# Patient Record
Sex: FEMALE | Race: WHITE | Employment: UNEMPLOYED | URBAN - NONMETROPOLITAN AREA
[De-identification: names, ages, dates, MRNs, and addresses within clinical notes are randomized per-mention and may not be internally consistent; named-entity substitution may affect disease eponyms.]

---

## 2017-04-24 ENCOUNTER — TELEPHONE (OUTPATIENT)
Dept: ONCOLOGY | Age: 47
End: 2017-04-24

## 2017-04-24 DIAGNOSIS — D50.9 IRON DEFICIENCY ANEMIA, UNSPECIFIED IRON DEFICIENCY ANEMIA TYPE: Primary | ICD-10-CM

## 2017-08-21 DIAGNOSIS — G35 MS (MULTIPLE SCLEROSIS) (HCC): Primary | ICD-10-CM

## 2017-08-22 DIAGNOSIS — G35 MS (MULTIPLE SCLEROSIS) (HCC): ICD-10-CM

## 2018-06-07 ENCOUNTER — TELEPHONE (OUTPATIENT)
Dept: ONCOLOGY | Age: 48
End: 2018-06-07

## 2018-06-13 ENCOUNTER — HOSPITAL ENCOUNTER (OUTPATIENT)
Dept: INFUSION THERAPY | Age: 48
Discharge: HOME | End: 2018-06-13
Payer: COMMERCIAL

## 2018-06-13 VITALS
WEIGHT: 249.6 LBS | OXYGEN SATURATION: 100 % | TEMPERATURE: 98.5 F | RESPIRATION RATE: 18 BRPM | HEART RATE: 86 BPM | BODY MASS INDEX: 34.94 KG/M2 | SYSTOLIC BLOOD PRESSURE: 131 MMHG | DIASTOLIC BLOOD PRESSURE: 74 MMHG | HEIGHT: 71 IN

## 2018-06-13 DIAGNOSIS — K90.9 MALABSORPTION OF IRON: ICD-10-CM

## 2018-06-13 DIAGNOSIS — D50.9 IRON DEFICIENCY ANEMIA, UNSPECIFIED IRON DEFICIENCY ANEMIA TYPE: ICD-10-CM

## 2018-06-13 PROCEDURE — 6360000002 HC RX W HCPCS: Performed by: INTERNAL MEDICINE

## 2018-06-13 PROCEDURE — 96365 THER/PROPH/DIAG IV INF INIT: CPT

## 2018-06-13 PROCEDURE — 2580000003 HC RX 258: Performed by: INTERNAL MEDICINE

## 2018-06-13 PROCEDURE — 96366 THER/PROPH/DIAG IV INF ADDON: CPT

## 2018-06-13 RX ORDER — DIPHENHYDRAMINE HYDROCHLORIDE 50 MG/ML
50 INJECTION INTRAMUSCULAR; INTRAVENOUS ONCE
Status: CANCELLED | OUTPATIENT
Start: 2018-06-13 | End: 2018-06-13

## 2018-06-13 RX ORDER — SODIUM CHLORIDE 0.9 % (FLUSH) 0.9 %
5 SYRINGE (ML) INJECTION PRN
Status: CANCELLED | OUTPATIENT
Start: 2018-06-13

## 2018-06-13 RX ORDER — MONTELUKAST SODIUM 4 MG/500MG
4 GRANULE ORAL NIGHTLY
COMMUNITY

## 2018-06-13 RX ORDER — METHYLPREDNISOLONE SODIUM SUCCINATE 125 MG/2ML
125 INJECTION, POWDER, LYOPHILIZED, FOR SOLUTION INTRAMUSCULAR; INTRAVENOUS ONCE
Status: CANCELLED | OUTPATIENT
Start: 2018-06-13 | End: 2018-06-13

## 2018-06-13 RX ORDER — SODIUM CHLORIDE 0.9 % (FLUSH) 0.9 %
10 SYRINGE (ML) INJECTION PRN
Status: CANCELLED | OUTPATIENT
Start: 2018-06-13

## 2018-06-13 RX ORDER — HEPARIN SODIUM (PORCINE) LOCK FLUSH IV SOLN 100 UNIT/ML 100 UNIT/ML
500 SOLUTION INTRAVENOUS PRN
Status: CANCELLED | OUTPATIENT
Start: 2018-06-13

## 2018-06-13 RX ORDER — EPINEPHRINE 1 MG/ML
0.3 INJECTION, SOLUTION, CONCENTRATE INTRAVENOUS PRN
Status: CANCELLED | OUTPATIENT
Start: 2018-06-13

## 2018-06-13 RX ORDER — SODIUM CHLORIDE 9 MG/ML
INJECTION, SOLUTION INTRAVENOUS ONCE
Status: COMPLETED | OUTPATIENT
Start: 2018-06-13 | End: 2018-06-13

## 2018-06-13 RX ORDER — 0.9 % SODIUM CHLORIDE 0.9 %
10 VIAL (ML) INJECTION ONCE
Status: CANCELLED | OUTPATIENT
Start: 2018-06-13 | End: 2018-06-13

## 2018-06-13 RX ORDER — SODIUM CHLORIDE 9 MG/ML
INJECTION, SOLUTION INTRAVENOUS CONTINUOUS
Status: CANCELLED | OUTPATIENT
Start: 2018-06-13

## 2018-06-13 RX ADMIN — SODIUM CHLORIDE: 9 INJECTION, SOLUTION INTRAVENOUS at 12:20

## 2018-06-13 RX ADMIN — IRON SUCROSE 300 MG: 20 INJECTION, SOLUTION INTRAVENOUS at 12:24

## 2018-06-13 NOTE — PROGRESS NOTES
Patient assessed for the following post venofer:    Dizziness   No  Lightheadedness  No      Acute nausea/vomiting No  Headache   No  Chest pain/pressure  No  Rash/itching   No  Shortness of breath  No    Patient kept for 20 minutes observation post infusion chemotherapy. Patient tolerated chemotherapy treatment venofer without any complications. Last vital signs:   /74   Pulse 86   Temp 98.5 °F (36.9 °C) (Oral)   Resp 18   Ht 5' 10.5\" (1.791 m)   Wt 249 lb 9.6 oz (113.2 kg)   SpO2 100%   BMI 35.31 kg/m²         Patient instructed if experience any of the above symptoms following today's infusion,he/she is to notify MD immediately or go to the emergency department. Discharge instructions given to patient. Verbalizes understanding. Ambulated off unit per self with belongings.

## 2018-06-13 NOTE — PLAN OF CARE
Problem: Intellectual/Education/Knowledge Deficit  Intervention: Verbal/written education provided  Discuss venofer,action and possible side effects. Aware to call MD if develop complications. Goal: Teaching initiated upon admission  Outcome: Met This Shift  Patient verbalizes understanding to verbal information given on venofer,action and possible side effects. Aware to call MD if develop complications. Problem: Discharge Planning  Intervention: Discharge to appropriate level of care  Discuss discharge instructions, follow ups and when to call doctor. Goal: Knowledge of discharge instructions  Knowledge of discharge instructions    Outcome: Met This Shift  Verbalized understanding of discharge instructions, follow ups and when to call doctor    Comments: Care plan reviewed with patient. Patient verbalized understanding of the plan of care and contribute to goal setting.

## 2018-07-17 ENCOUNTER — TELEPHONE (OUTPATIENT)
Dept: ONCOLOGY | Age: 48
End: 2018-07-17

## 2018-07-17 RX ORDER — DIPHENHYDRAMINE HYDROCHLORIDE 50 MG/ML
50 INJECTION INTRAMUSCULAR; INTRAVENOUS ONCE
Status: CANCELLED | OUTPATIENT
Start: 2018-07-25 | End: 2018-07-25

## 2018-07-17 RX ORDER — SODIUM CHLORIDE 0.9 % (FLUSH) 0.9 %
10 SYRINGE (ML) INJECTION PRN
Status: CANCELLED | OUTPATIENT
Start: 2018-07-25

## 2018-07-17 RX ORDER — METHYLPREDNISOLONE SODIUM SUCCINATE 125 MG/2ML
125 INJECTION, POWDER, LYOPHILIZED, FOR SOLUTION INTRAMUSCULAR; INTRAVENOUS ONCE
Status: CANCELLED | OUTPATIENT
Start: 2018-07-25 | End: 2018-07-25

## 2018-07-17 RX ORDER — SODIUM CHLORIDE 9 MG/ML
INJECTION, SOLUTION INTRAVENOUS ONCE
Status: CANCELLED | OUTPATIENT
Start: 2018-07-25 | End: 2018-07-25

## 2018-07-17 RX ORDER — HEPARIN SODIUM (PORCINE) LOCK FLUSH IV SOLN 100 UNIT/ML 100 UNIT/ML
500 SOLUTION INTRAVENOUS PRN
Status: CANCELLED | OUTPATIENT
Start: 2018-07-25

## 2018-07-17 RX ORDER — 0.9 % SODIUM CHLORIDE 0.9 %
10 VIAL (ML) INJECTION ONCE
Status: CANCELLED | OUTPATIENT
Start: 2018-07-25 | End: 2018-07-25

## 2018-07-17 RX ORDER — SODIUM CHLORIDE 9 MG/ML
INJECTION, SOLUTION INTRAVENOUS CONTINUOUS
Status: CANCELLED | OUTPATIENT
Start: 2018-07-25

## 2018-07-17 RX ORDER — SODIUM CHLORIDE 0.9 % (FLUSH) 0.9 %
5 SYRINGE (ML) INJECTION PRN
Status: CANCELLED | OUTPATIENT
Start: 2018-07-25

## 2018-07-17 NOTE — TELEPHONE ENCOUNTER
Patient is calling and said the she has no energy. She had an IV iron during the 1st week of June. Her ferritin is only at 30.  Please advise

## 2018-07-25 ENCOUNTER — HOSPITAL ENCOUNTER (OUTPATIENT)
Dept: INFUSION THERAPY | Age: 48
Discharge: HOME | End: 2018-07-25
Payer: COMMERCIAL

## 2018-07-25 VITALS
OXYGEN SATURATION: 100 % | HEART RATE: 72 BPM | SYSTOLIC BLOOD PRESSURE: 131 MMHG | HEIGHT: 71 IN | TEMPERATURE: 98 F | BODY MASS INDEX: 35.34 KG/M2 | RESPIRATION RATE: 18 BRPM | WEIGHT: 252.4 LBS | DIASTOLIC BLOOD PRESSURE: 83 MMHG

## 2018-07-25 DIAGNOSIS — D50.9 IRON DEFICIENCY ANEMIA, UNSPECIFIED IRON DEFICIENCY ANEMIA TYPE: ICD-10-CM

## 2018-07-25 DIAGNOSIS — E61.1 IRON DEFICIENCY: Primary | ICD-10-CM

## 2018-07-25 DIAGNOSIS — K90.9 MALABSORPTION OF IRON: ICD-10-CM

## 2018-07-25 PROCEDURE — 96365 THER/PROPH/DIAG IV INF INIT: CPT

## 2018-07-25 PROCEDURE — 2709999900 HC NON-CHARGEABLE SUPPLY

## 2018-07-25 PROCEDURE — 6360000002 HC RX W HCPCS: Performed by: INTERNAL MEDICINE

## 2018-07-25 PROCEDURE — 2580000003 HC RX 258: Performed by: INTERNAL MEDICINE

## 2018-07-25 PROCEDURE — 96366 THER/PROPH/DIAG IV INF ADDON: CPT

## 2018-07-25 RX ORDER — SODIUM CHLORIDE 9 MG/ML
INJECTION, SOLUTION INTRAVENOUS ONCE
Status: COMPLETED | OUTPATIENT
Start: 2018-07-25 | End: 2018-07-25

## 2018-07-25 RX ORDER — SCOLOPAMINE TRANSDERMAL SYSTEM 1 MG/1
1 PATCH, EXTENDED RELEASE TRANSDERMAL
Qty: 4 PATCH | Refills: 3 | Status: SHIPPED | OUTPATIENT
Start: 2018-07-25 | End: 2019-02-04 | Stop reason: SDUPTHER

## 2018-07-25 RX ADMIN — IRON SUCROSE 300 MG: 20 INJECTION, SOLUTION INTRAVENOUS at 16:02

## 2018-07-25 RX ADMIN — SODIUM CHLORIDE: 9 INJECTION, SOLUTION INTRAVENOUS at 16:01

## 2018-07-26 NOTE — PROGRESS NOTES
Patient assessed for the following post venofer:    Dizziness   No  Lightheadedness  No      Acute nausea/vomiting No  Headache   No  Chest pain/pressure  No  Rash/itching   No  Shortness of breath  No    Patient kept for 20 minutes observation post infusion venofer. Patient tolerated venofer without any complications. Last vital signs:   /83   Pulse 72   Temp 98 °F (36.7 °C) (Oral)   Resp 18   Ht 5' 10.5\" (1.791 m)   Wt 252 lb 6.4 oz (114.5 kg)   SpO2 100%   BMI 35.70 kg/m²         Patient instructed if experience any of the above symptoms following today's infusion,he/she is to notify MD immediately or go to the emergency department. Discharge instructions given to patient. Verbalizes understanding. Ambulated off unit per self with belongings.

## 2018-07-31 RX ORDER — EPINEPHRINE 1 MG/ML
0.3 INJECTION, SOLUTION, CONCENTRATE INTRAVENOUS PRN
Status: CANCELLED | OUTPATIENT
Start: 2018-08-01

## 2018-07-31 RX ORDER — DIPHENHYDRAMINE HYDROCHLORIDE 50 MG/ML
50 INJECTION INTRAMUSCULAR; INTRAVENOUS ONCE
Status: CANCELLED | OUTPATIENT
Start: 2018-08-01 | End: 2018-07-31

## 2018-07-31 RX ORDER — SODIUM CHLORIDE 0.9 % (FLUSH) 0.9 %
10 SYRINGE (ML) INJECTION PRN
Status: CANCELLED | OUTPATIENT
Start: 2018-08-01

## 2018-07-31 RX ORDER — SODIUM CHLORIDE 0.9 % (FLUSH) 0.9 %
5 SYRINGE (ML) INJECTION PRN
Status: CANCELLED | OUTPATIENT
Start: 2018-08-01

## 2018-07-31 RX ORDER — SODIUM CHLORIDE 9 MG/ML
INJECTION, SOLUTION INTRAVENOUS CONTINUOUS
Status: CANCELLED | OUTPATIENT
Start: 2018-08-01

## 2018-07-31 RX ORDER — HEPARIN SODIUM (PORCINE) LOCK FLUSH IV SOLN 100 UNIT/ML 100 UNIT/ML
500 SOLUTION INTRAVENOUS PRN
Status: CANCELLED | OUTPATIENT
Start: 2018-08-01

## 2018-07-31 RX ORDER — 0.9 % SODIUM CHLORIDE 0.9 %
10 VIAL (ML) INJECTION ONCE
Status: CANCELLED | OUTPATIENT
Start: 2018-08-01 | End: 2018-07-31

## 2018-07-31 RX ORDER — METHYLPREDNISOLONE SODIUM SUCCINATE 125 MG/2ML
125 INJECTION, POWDER, LYOPHILIZED, FOR SOLUTION INTRAMUSCULAR; INTRAVENOUS ONCE
Status: CANCELLED | OUTPATIENT
Start: 2018-08-01 | End: 2018-07-31

## 2018-08-01 ENCOUNTER — HOSPITAL ENCOUNTER (OUTPATIENT)
Dept: INFUSION THERAPY | Age: 48
Discharge: HOME | End: 2018-08-01
Payer: COMMERCIAL

## 2018-08-01 VITALS
OXYGEN SATURATION: 100 % | SYSTOLIC BLOOD PRESSURE: 138 MMHG | RESPIRATION RATE: 16 BRPM | TEMPERATURE: 98.7 F | DIASTOLIC BLOOD PRESSURE: 82 MMHG | HEART RATE: 70 BPM

## 2018-08-01 DIAGNOSIS — K90.9 MALABSORPTION OF IRON: ICD-10-CM

## 2018-08-01 DIAGNOSIS — D50.9 IRON DEFICIENCY ANEMIA, UNSPECIFIED IRON DEFICIENCY ANEMIA TYPE: ICD-10-CM

## 2018-08-01 LAB
BASOPHILS # BLD: 0.5 %
BASOPHILS ABSOLUTE: 0 THOU/MM3 (ref 0–0.1)
EOSINOPHIL # BLD: 1 %
EOSINOPHILS ABSOLUTE: 0.1 THOU/MM3 (ref 0–0.4)
ERYTHROCYTE [DISTWIDTH] IN BLOOD BY AUTOMATED COUNT: 17.3 % (ref 11.5–14.5)
ERYTHROCYTE [DISTWIDTH] IN BLOOD BY AUTOMATED COUNT: 49.3 FL (ref 35–45)
FERRITIN: 227 NG/ML (ref 10–291)
HCT VFR BLD CALC: 38.7 % (ref 37–47)
HEMOGLOBIN: 13.2 GM/DL (ref 12–16)
IMMATURE GRANS (ABS): 0.01 THOU/MM3 (ref 0–0.07)
IMMATURE GRANULOCYTES: 0.2 %
LYMPHOCYTES # BLD: 19.5 %
LYMPHOCYTES ABSOLUTE: 1.1 THOU/MM3 (ref 1–4.8)
MCH RBC QN AUTO: 27.3 PG (ref 26–33)
MCHC RBC AUTO-ENTMCNC: 34.1 GM/DL (ref 32.2–35.5)
MCV RBC AUTO: 80.1 FL (ref 81–99)
MONOCYTES # BLD: 10.8 %
MONOCYTES ABSOLUTE: 0.6 THOU/MM3 (ref 0.4–1.3)
NUCLEATED RED BLOOD CELLS: 0 /100 WBC
PLATELET # BLD: 346 THOU/MM3 (ref 130–400)
PMV BLD AUTO: 10.8 FL (ref 9.4–12.4)
RBC # BLD: 4.83 MILL/MM3 (ref 4.2–5.4)
SEG NEUTROPHILS: 68 %
SEGMENTED NEUTROPHILS ABSOLUTE COUNT: 3.9 THOU/MM3 (ref 1.8–7.7)
WBC # BLD: 5.7 THOU/MM3 (ref 4.8–10.8)

## 2018-08-01 PROCEDURE — 36415 COLL VENOUS BLD VENIPUNCTURE: CPT

## 2018-08-01 PROCEDURE — 96365 THER/PROPH/DIAG IV INF INIT: CPT

## 2018-08-01 PROCEDURE — 82728 ASSAY OF FERRITIN: CPT

## 2018-08-01 PROCEDURE — 96366 THER/PROPH/DIAG IV INF ADDON: CPT

## 2018-08-01 PROCEDURE — 2709999900 HC NON-CHARGEABLE SUPPLY

## 2018-08-01 PROCEDURE — G0463 HOSPITAL OUTPT CLINIC VISIT: HCPCS

## 2018-08-01 PROCEDURE — 85025 COMPLETE CBC W/AUTO DIFF WBC: CPT

## 2018-08-01 PROCEDURE — 6360000002 HC RX W HCPCS: Performed by: INTERNAL MEDICINE

## 2018-08-01 PROCEDURE — 2580000003 HC RX 258: Performed by: INTERNAL MEDICINE

## 2018-08-01 RX ORDER — SODIUM CHLORIDE 9 MG/ML
INJECTION, SOLUTION INTRAVENOUS ONCE
Status: COMPLETED | OUTPATIENT
Start: 2018-08-01 | End: 2018-08-01

## 2018-08-01 RX ORDER — ASCORBIC ACID 500 MG
1000 TABLET ORAL DAILY
COMMUNITY
End: 2022-02-09 | Stop reason: ALTCHOICE

## 2018-08-01 RX ORDER — PREDNISONE 10 MG/1
10 TABLET ORAL DAILY
COMMUNITY

## 2018-08-01 RX ADMIN — IRON SUCROSE 300 MG: 20 INJECTION, SOLUTION INTRAVENOUS at 15:35

## 2018-08-01 RX ADMIN — SODIUM CHLORIDE: 9 INJECTION, SOLUTION INTRAVENOUS at 15:35

## 2018-08-01 ASSESSMENT — PAIN DESCRIPTION - ORIENTATION
ORIENTATION: RIGHT
ORIENTATION: RIGHT

## 2018-08-01 ASSESSMENT — PAIN DESCRIPTION - FREQUENCY: FREQUENCY: INTERMITTENT

## 2018-08-01 ASSESSMENT — PAIN SCALES - GENERAL
PAINLEVEL_OUTOF10: 3
PAINLEVEL_OUTOF10: 3

## 2018-08-01 ASSESSMENT — PAIN DESCRIPTION - LOCATION
LOCATION: ABDOMEN
LOCATION: ABDOMEN

## 2018-08-01 ASSESSMENT — PAIN DESCRIPTION - PAIN TYPE
TYPE: CHRONIC PAIN
TYPE: CHRONIC PAIN

## 2018-08-01 ASSESSMENT — PAIN DESCRIPTION - ONSET: ONSET: ON-GOING

## 2018-08-01 NOTE — PROGRESS NOTES
Patient assessed for the following post VENOFER:    Dizziness   No  Lightheadedness  No      Acute nausea/vomiting No  Headache   No  Chest pain/pressure  No  Rash/itching   No  Shortness of breath  No    Patient kept for 20 minutes observation post infusion chemotherapy. Patient tolerated VENOFER INFUSION without any complications. Last vital signs:   /82   Pulse 70   Temp 98.7 °F (37.1 °C) (Oral)   Resp 16   SpO2 100%     Patient instructed if experience any of the above symptoms following today's infusion,she is to notify MD immediately or go to the emergency department. Discharge instructions given to patient. Verbalizes understanding. Ambulated off unit per self with belongings.

## 2018-08-02 ENCOUNTER — TELEPHONE (OUTPATIENT)
Dept: ONCOLOGY | Age: 48
End: 2018-08-02

## 2018-08-02 NOTE — TELEPHONE ENCOUNTER
Patient calling for update on blood work and update for treatment in relation to labs. States feeling severely fatigued. Also wanted to make note that WBC for patient needs to be below 4 and is currently 5.7. Please advise.

## 2018-08-02 NOTE — TELEPHONE ENCOUNTER
Hemoglobin, hematocrit, and ferritin are normal.  Blood results are good. She should probably see Dr. Aldo Gray - her PCP - to see if there is something else making her feel bad.

## 2019-01-23 ENCOUNTER — TELEPHONE (OUTPATIENT)
Dept: ONCOLOGY | Age: 49
End: 2019-01-23

## 2019-01-23 DIAGNOSIS — D50.9 IRON DEFICIENCY ANEMIA, UNSPECIFIED IRON DEFICIENCY ANEMIA TYPE: Primary | ICD-10-CM

## 2019-01-23 LAB
BASOPHILS ABSOLUTE: 0 /ΜL
BASOPHILS RELATIVE PERCENT: 0.4 %
EOSINOPHILS ABSOLUTE: 0.1 /ΜL
EOSINOPHILS RELATIVE PERCENT: 1.1 %
FERRITIN: 26.3 NG/ML (ref 9–150)
HCT VFR BLD CALC: 40.3 % (ref 36–46)
HEMOGLOBIN: 13.7 G/DL (ref 12–16)
LYMPHOCYTES ABSOLUTE: 0.9 /ΜL
LYMPHOCYTES RELATIVE PERCENT: 20.6 %
MCH RBC QN AUTO: 28.9 PG
MCHC RBC AUTO-ENTMCNC: 34 G/DL
MCV RBC AUTO: 85 FL
MONOCYTES ABSOLUTE: 0.5 /ΜL
MONOCYTES RELATIVE PERCENT: 11.1 %
NEUTROPHILS ABSOLUTE: 3.1 /ΜL
NEUTROPHILS RELATIVE PERCENT: 66.6 %
PLATELET # BLD: 338 K/ΜL
PMV BLD AUTO: 10.9 FL
RBC # BLD: 4.74 10^6/ΜL
WBC # BLD: 4.6 10^3/ML

## 2019-01-29 ENCOUNTER — TELEPHONE (OUTPATIENT)
Dept: ONCOLOGY | Age: 49
End: 2019-01-29

## 2019-02-02 RX ORDER — SODIUM CHLORIDE 0.9 % (FLUSH) 0.9 %
5 SYRINGE (ML) INJECTION PRN
Status: CANCELLED | OUTPATIENT
Start: 2019-02-04

## 2019-02-02 RX ORDER — DIPHENHYDRAMINE HYDROCHLORIDE 50 MG/ML
50 INJECTION INTRAMUSCULAR; INTRAVENOUS ONCE
Status: CANCELLED | OUTPATIENT
Start: 2019-02-04 | End: 2019-02-18

## 2019-02-02 RX ORDER — HEPARIN SODIUM (PORCINE) LOCK FLUSH IV SOLN 100 UNIT/ML 100 UNIT/ML
500 SOLUTION INTRAVENOUS PRN
Status: CANCELLED | OUTPATIENT
Start: 2019-02-04

## 2019-02-02 RX ORDER — SODIUM CHLORIDE 0.9 % (FLUSH) 0.9 %
10 SYRINGE (ML) INJECTION PRN
Status: CANCELLED | OUTPATIENT
Start: 2019-02-04

## 2019-02-02 RX ORDER — SODIUM CHLORIDE 9 MG/ML
INJECTION, SOLUTION INTRAVENOUS CONTINUOUS
Status: CANCELLED | OUTPATIENT
Start: 2019-02-04

## 2019-02-02 RX ORDER — 0.9 % SODIUM CHLORIDE 0.9 %
10 VIAL (ML) INJECTION ONCE
Status: CANCELLED | OUTPATIENT
Start: 2019-02-04 | End: 2019-02-18

## 2019-02-02 RX ORDER — METHYLPREDNISOLONE SODIUM SUCCINATE 125 MG/2ML
125 INJECTION, POWDER, LYOPHILIZED, FOR SOLUTION INTRAMUSCULAR; INTRAVENOUS ONCE
Status: CANCELLED | OUTPATIENT
Start: 2019-02-04 | End: 2019-02-18

## 2019-02-04 ENCOUNTER — HOSPITAL ENCOUNTER (OUTPATIENT)
Dept: INFUSION THERAPY | Age: 49
Discharge: HOME | End: 2019-02-04
Payer: COMMERCIAL

## 2019-02-04 VITALS
TEMPERATURE: 98.4 F | RESPIRATION RATE: 18 BRPM | HEART RATE: 95 BPM | DIASTOLIC BLOOD PRESSURE: 81 MMHG | SYSTOLIC BLOOD PRESSURE: 142 MMHG | OXYGEN SATURATION: 99 %

## 2019-02-04 DIAGNOSIS — D50.9 IRON DEFICIENCY ANEMIA, UNSPECIFIED IRON DEFICIENCY ANEMIA TYPE: ICD-10-CM

## 2019-02-04 DIAGNOSIS — E61.1 IRON DEFICIENCY: ICD-10-CM

## 2019-02-04 DIAGNOSIS — K90.9 MALABSORPTION OF IRON: ICD-10-CM

## 2019-02-04 PROCEDURE — 2580000003 HC RX 258: Performed by: INTERNAL MEDICINE

## 2019-02-04 PROCEDURE — 96366 THER/PROPH/DIAG IV INF ADDON: CPT

## 2019-02-04 PROCEDURE — 2709999900 HC NON-CHARGEABLE SUPPLY

## 2019-02-04 PROCEDURE — 96365 THER/PROPH/DIAG IV INF INIT: CPT

## 2019-02-04 PROCEDURE — 6360000002 HC RX W HCPCS: Performed by: INTERNAL MEDICINE

## 2019-02-04 RX ORDER — SODIUM CHLORIDE 9 MG/ML
INJECTION, SOLUTION INTRAVENOUS ONCE
Status: COMPLETED | OUTPATIENT
Start: 2019-02-04 | End: 2019-02-04

## 2019-02-04 RX ORDER — SCOLOPAMINE TRANSDERMAL SYSTEM 1 MG/1
1 PATCH, EXTENDED RELEASE TRANSDERMAL
Qty: 4 PATCH | Refills: 3 | Status: SHIPPED | OUTPATIENT
Start: 2019-02-04 | End: 2020-02-20

## 2019-02-04 RX ADMIN — SODIUM CHLORIDE: 9 INJECTION, SOLUTION INTRAVENOUS at 15:40

## 2019-02-04 RX ADMIN — IRON SUCROSE 300 MG: 20 INJECTION, SOLUTION INTRAVENOUS at 15:41

## 2019-02-04 ASSESSMENT — PAIN SCALES - GENERAL: PAINLEVEL_OUTOF10: 0

## 2019-02-04 NOTE — PROGRESS NOTES
Patient assessed for the following post venofer:    Dizziness   No  Lightheadedness  No      Acute nausea/vomiting No  Headache   No  Chest pain/pressure  No  Rash/itching   No  Shortness of breath  No    Patient kept for 20 minutes observation post infusion . Patient tolerated venofer without any complications. Last vital signs:   BP (!) 142/81   Pulse 95   Temp 98.4 °F (36.9 °C) (Oral)   Resp 18   SpO2 99%     Patient instructed if experience any of the above symptoms following today's infusion,she is to notify MD immediately or go to the emergency department. Discharge instructions given to patient. Verbalizes understanding. Ambulated off unit per self with belongings.

## 2019-02-05 RX ORDER — HEPARIN SODIUM (PORCINE) LOCK FLUSH IV SOLN 100 UNIT/ML 100 UNIT/ML
500 SOLUTION INTRAVENOUS PRN
Status: CANCELLED | OUTPATIENT
Start: 2019-02-05

## 2019-02-05 RX ORDER — SODIUM CHLORIDE 0.9 % (FLUSH) 0.9 %
10 SYRINGE (ML) INJECTION PRN
Status: CANCELLED | OUTPATIENT
Start: 2019-02-05

## 2019-02-05 RX ORDER — SODIUM CHLORIDE 0.9 % (FLUSH) 0.9 %
20 SYRINGE (ML) INJECTION PRN
Status: CANCELLED | OUTPATIENT
Start: 2019-02-05

## 2019-02-06 ENCOUNTER — TELEPHONE (OUTPATIENT)
Dept: ONCOLOGY | Age: 49
End: 2019-02-06

## 2019-02-06 RX ORDER — ONDANSETRON 4 MG/1
4 TABLET, FILM COATED ORAL EVERY 8 HOURS PRN
Qty: 30 TABLET | Refills: 3 | Status: SHIPPED | OUTPATIENT
Start: 2019-02-06

## 2019-02-11 ENCOUNTER — HOSPITAL ENCOUNTER (OUTPATIENT)
Dept: INFUSION THERAPY | Age: 49
Discharge: HOME | End: 2019-02-11
Payer: COMMERCIAL

## 2019-02-11 VITALS
RESPIRATION RATE: 18 BRPM | OXYGEN SATURATION: 97 % | BODY MASS INDEX: 34.23 KG/M2 | TEMPERATURE: 98.3 F | SYSTOLIC BLOOD PRESSURE: 126 MMHG | DIASTOLIC BLOOD PRESSURE: 80 MMHG | HEART RATE: 60 BPM | WEIGHT: 242 LBS

## 2019-02-11 DIAGNOSIS — D50.9 IRON DEFICIENCY ANEMIA, UNSPECIFIED IRON DEFICIENCY ANEMIA TYPE: ICD-10-CM

## 2019-02-11 DIAGNOSIS — K90.9 MALABSORPTION OF IRON: ICD-10-CM

## 2019-02-11 PROCEDURE — 2709999900 HC NON-CHARGEABLE SUPPLY

## 2019-02-11 PROCEDURE — G0463 HOSPITAL OUTPT CLINIC VISIT: HCPCS

## 2019-02-11 PROCEDURE — 96365 THER/PROPH/DIAG IV INF INIT: CPT

## 2019-02-11 PROCEDURE — 96375 TX/PRO/DX INJ NEW DRUG ADDON: CPT

## 2019-02-11 PROCEDURE — 96366 THER/PROPH/DIAG IV INF ADDON: CPT

## 2019-02-11 PROCEDURE — 2580000003 HC RX 258: Performed by: INTERNAL MEDICINE

## 2019-02-11 PROCEDURE — 6360000002 HC RX W HCPCS: Performed by: INTERNAL MEDICINE

## 2019-02-11 RX ORDER — SODIUM CHLORIDE 0.9 % (FLUSH) 0.9 %
10 SYRINGE (ML) INJECTION PRN
Status: CANCELLED | OUTPATIENT
Start: 2019-02-11

## 2019-02-11 RX ORDER — DIPHENHYDRAMINE HYDROCHLORIDE 50 MG/ML
50 INJECTION INTRAMUSCULAR; INTRAVENOUS ONCE
Status: CANCELLED | OUTPATIENT
Start: 2019-02-11 | End: 2019-02-11

## 2019-02-11 RX ORDER — PALONOSETRON 0.05 MG/ML
0.25 INJECTION, SOLUTION INTRAVENOUS ONCE
Status: COMPLETED | OUTPATIENT
Start: 2019-02-11 | End: 2019-02-11

## 2019-02-11 RX ORDER — 0.9 % SODIUM CHLORIDE 0.9 %
10 VIAL (ML) INJECTION ONCE
Status: CANCELLED | OUTPATIENT
Start: 2019-02-11 | End: 2019-02-11

## 2019-02-11 RX ORDER — 0.9 % SODIUM CHLORIDE 0.9 %
10 VIAL (ML) INJECTION ONCE
Status: CANCELLED | OUTPATIENT
Start: 2019-02-18 | End: 2019-02-18

## 2019-02-11 RX ORDER — SODIUM CHLORIDE 9 MG/ML
INJECTION, SOLUTION INTRAVENOUS ONCE
Status: CANCELLED | OUTPATIENT
Start: 2019-02-18

## 2019-02-11 RX ORDER — SODIUM CHLORIDE 0.9 % (FLUSH) 0.9 %
5 SYRINGE (ML) INJECTION PRN
Status: CANCELLED | OUTPATIENT
Start: 2019-02-18

## 2019-02-11 RX ORDER — METHYLPREDNISOLONE SODIUM SUCCINATE 125 MG/2ML
125 INJECTION, POWDER, LYOPHILIZED, FOR SOLUTION INTRAMUSCULAR; INTRAVENOUS ONCE
Status: CANCELLED | OUTPATIENT
Start: 2019-02-11 | End: 2019-02-11

## 2019-02-11 RX ORDER — DIPHENHYDRAMINE HYDROCHLORIDE 50 MG/ML
50 INJECTION INTRAMUSCULAR; INTRAVENOUS ONCE
Status: CANCELLED | OUTPATIENT
Start: 2019-02-18 | End: 2019-02-18

## 2019-02-11 RX ORDER — SODIUM CHLORIDE 0.9 % (FLUSH) 0.9 %
5 SYRINGE (ML) INJECTION PRN
Status: CANCELLED | OUTPATIENT
Start: 2019-02-11

## 2019-02-11 RX ORDER — HEPARIN SODIUM (PORCINE) LOCK FLUSH IV SOLN 100 UNIT/ML 100 UNIT/ML
500 SOLUTION INTRAVENOUS PRN
Status: CANCELLED | OUTPATIENT
Start: 2019-02-18

## 2019-02-11 RX ORDER — SODIUM CHLORIDE 0.9 % (FLUSH) 0.9 %
10 SYRINGE (ML) INJECTION PRN
Status: CANCELLED | OUTPATIENT
Start: 2019-02-18

## 2019-02-11 RX ORDER — METHYLPREDNISOLONE SODIUM SUCCINATE 125 MG/2ML
125 INJECTION, POWDER, LYOPHILIZED, FOR SOLUTION INTRAMUSCULAR; INTRAVENOUS ONCE
Status: CANCELLED | OUTPATIENT
Start: 2019-02-18 | End: 2019-02-18

## 2019-02-11 RX ORDER — SODIUM CHLORIDE 9 MG/ML
INJECTION, SOLUTION INTRAVENOUS CONTINUOUS
Status: CANCELLED | OUTPATIENT
Start: 2019-02-11

## 2019-02-11 RX ORDER — SODIUM CHLORIDE 9 MG/ML
INJECTION, SOLUTION INTRAVENOUS CONTINUOUS
Status: CANCELLED | OUTPATIENT
Start: 2019-02-18

## 2019-02-11 RX ORDER — SODIUM CHLORIDE 9 MG/ML
INJECTION, SOLUTION INTRAVENOUS ONCE
Status: COMPLETED | OUTPATIENT
Start: 2019-02-11 | End: 2019-02-11

## 2019-02-11 RX ORDER — HEPARIN SODIUM (PORCINE) LOCK FLUSH IV SOLN 100 UNIT/ML 100 UNIT/ML
500 SOLUTION INTRAVENOUS PRN
Status: CANCELLED | OUTPATIENT
Start: 2019-02-11

## 2019-02-11 RX ADMIN — PALONOSETRON 0.25 MG: 0.05 INJECTION, SOLUTION INTRAVENOUS at 15:47

## 2019-02-11 RX ADMIN — IRON SUCROSE 300 MG: 20 INJECTION, SOLUTION INTRAVENOUS at 15:51

## 2019-02-11 RX ADMIN — SODIUM CHLORIDE: 9 INJECTION, SOLUTION INTRAVENOUS at 15:43

## 2019-02-11 ASSESSMENT — PAIN SCALES - GENERAL
PAINLEVEL_OUTOF10: 0
PAINLEVEL_OUTOF10: 0

## 2019-02-11 NOTE — PLAN OF CARE
Problem: Musculor/Skeletal Functional Status  Intervention: Fall precautions  Patient aware of fall precautions for here and at home -call light in reach     Goal: Absence of falls  Outcome: Met This Shift  No falls this admission     Problem: Intellectual/Education/Knowledge Deficit  Intervention: Verbal/written education provided  Discharge instruction sheets     Goal: Teaching initiated upon admission  Outcome: Met This Shift  Reviewed venofer and aloxi with patient, patient offered no questions or concerns. Patient verbalized understanding of drug being administered. Goal: Written Disposition Instruction form completed  Outcome: Met This Shift  Discharge instructions given and reviewed with patient. All questions answered. Patient verbalized understanding    Problem: Discharge Planning  Intervention: Interaction with patient/family and care team  Patient currently denies any needs or concerns  Intervention: Discharge to appropriate level of care  Discharge instruction sheets    Goal: Knowledge of discharge instructions  Knowledge of discharge instructions    Outcome: Met This Shift  Patient and family member able to teach back follow up appointments and when to call the doctor. Patient offers no questions at this time    Problem: Infection - Central Venous Catheter-Associated Bloodstream Infection:  Intervention: Central line needs assessment  Patient aware that is of increased risk for infection due to receiving chemotherapy and having a central venous catheter. Patient aware of signs and symptoms of infection and when to call the doctor    Goal: Will show no infection signs and symptoms  Will show no infection signs and symptoms  Outcome: Met This Shift  No signs of infection noted at mediport site    Comments: Care plan reviewed with patient and she verbalized understanding of the plan of care and contribute to goal setting.

## 2019-02-11 NOTE — PROGRESS NOTES
Patient assessed for the following post venofer:    Dizziness   No  Lightheadedness  No      Acute nausea/vomiting No  Headache   No  Chest pain/pressure  No  Rash/itching   No  Shortness of breath  No    Patient kept for 20 minutes observation post infusion . Patient tolerated venofer without any complications. Last vital signs:   /80   Pulse 60   Temp 98.3 °F (36.8 °C) (Oral)   Resp 18   Wt 242 lb (109.8 kg)   SpO2 97%   BMI 34.23 kg/m²     Patient instructed if experience any of the above symptoms following today's infusion,she is to notify MD immediately or go to the emergency department. Discharge instructions given to patient. Verbalizes understanding. Ambulated off unit per self with belongings.

## 2019-02-18 ENCOUNTER — HOSPITAL ENCOUNTER (OUTPATIENT)
Dept: INFUSION THERAPY | Age: 49
Discharge: HOME | End: 2019-02-18
Payer: COMMERCIAL

## 2019-02-18 VITALS
SYSTOLIC BLOOD PRESSURE: 132 MMHG | WEIGHT: 246 LBS | TEMPERATURE: 98.8 F | OXYGEN SATURATION: 93 % | HEIGHT: 69 IN | DIASTOLIC BLOOD PRESSURE: 87 MMHG | BODY MASS INDEX: 36.43 KG/M2 | RESPIRATION RATE: 18 BRPM | HEART RATE: 72 BPM

## 2019-02-18 DIAGNOSIS — K90.9 MALABSORPTION OF IRON: ICD-10-CM

## 2019-02-18 DIAGNOSIS — D50.9 IRON DEFICIENCY ANEMIA, UNSPECIFIED IRON DEFICIENCY ANEMIA TYPE: ICD-10-CM

## 2019-02-18 PROCEDURE — 2580000003 HC RX 258: Performed by: INTERNAL MEDICINE

## 2019-02-18 PROCEDURE — 96375 TX/PRO/DX INJ NEW DRUG ADDON: CPT

## 2019-02-18 PROCEDURE — 2709999900 HC NON-CHARGEABLE SUPPLY

## 2019-02-18 PROCEDURE — 96366 THER/PROPH/DIAG IV INF ADDON: CPT

## 2019-02-18 PROCEDURE — 96365 THER/PROPH/DIAG IV INF INIT: CPT

## 2019-02-18 PROCEDURE — 6360000002 HC RX W HCPCS: Performed by: INTERNAL MEDICINE

## 2019-02-18 RX ORDER — SODIUM CHLORIDE 9 MG/ML
INJECTION, SOLUTION INTRAVENOUS ONCE
Status: COMPLETED | OUTPATIENT
Start: 2019-02-18 | End: 2019-02-18

## 2019-02-18 RX ORDER — PALONOSETRON 0.05 MG/ML
0.25 INJECTION, SOLUTION INTRAVENOUS ONCE
Status: COMPLETED | OUTPATIENT
Start: 2019-02-18 | End: 2019-02-18

## 2019-02-18 RX ADMIN — PALONOSETRON 0.25 MG: 0.05 INJECTION, SOLUTION INTRAVENOUS at 15:53

## 2019-02-18 RX ADMIN — IRON SUCROSE 300 MG: 20 INJECTION, SOLUTION INTRAVENOUS at 15:58

## 2019-02-18 RX ADMIN — SODIUM CHLORIDE: 9 INJECTION, SOLUTION INTRAVENOUS at 15:45

## 2019-02-18 NOTE — PLAN OF CARE
Problem: Intellectual/Education/Knowledge Deficit  Intervention: Verbal/written education provided  Discussed information given on iron infusion ,action and possible side effects. Aware to call MD if develop complications. Goal: Teaching initiated upon admission  Patient verbalizes understanding to verbal information given on iron infusion ,action and possible side effects. Aware to call MD if develop complications. Problem: Discharge Planning  Intervention: Interaction with patient/family and care team  Discuss understanding of discharge instructions,follow-up appointments, and when to call the physician. Goal: Knowledge of discharge instructions  Knowledge of discharge instructions     Outcome: Met This Shift  Verbalized understanding of discharge instructions, follow-up appointments, and when to call the physician. Comments: Care plan reviewed with patient. Patient verbalize understanding of the plan of care and contribute to goal setting.

## 2019-03-20 LAB
BASOPHILS ABSOLUTE: 0 /ΜL
BASOPHILS RELATIVE PERCENT: 0.5 %
EOSINOPHILS ABSOLUTE: 0 /ΜL
EOSINOPHILS RELATIVE PERCENT: 1.1 %
FERRITIN: 286 NG/ML (ref 9–150)
HCT VFR BLD CALC: 39.9 % (ref 36–46)
HEMOGLOBIN: 13.8 G/DL (ref 12–16)
LYMPHOCYTES ABSOLUTE: 0.8 /ΜL
LYMPHOCYTES RELATIVE PERCENT: 23.6 %
MCH RBC QN AUTO: 29.8 PG
MCHC RBC AUTO-ENTMCNC: 34.6 G/DL
MCV RBC AUTO: 86.2 FL
MONOCYTES ABSOLUTE: 0.3 /ΜL
MONOCYTES RELATIVE PERCENT: 9.1 %
NEUTROPHILS ABSOLUTE: 2.4 /ΜL
NEUTROPHILS RELATIVE PERCENT: 65.4 %
PLATELET # BLD: 351 K/ΜL
PMV BLD AUTO: 10 FL
RBC # BLD: 4.63 10^6/ΜL
WBC # BLD: 3.6 10^3/ML

## 2019-06-06 DIAGNOSIS — E61.1 IRON DEFICIENCY: Primary | ICD-10-CM

## 2019-06-06 LAB
BASOPHILS ABSOLUTE: 0 /ΜL
BASOPHILS RELATIVE PERCENT: 0.4 %
EOSINOPHILS ABSOLUTE: 0.1 /ΜL
EOSINOPHILS RELATIVE PERCENT: 1.2 %
FERRITIN: 97 NG/ML (ref 9–150)
HCT VFR BLD CALC: 39.5 % (ref 36–46)
HEMOGLOBIN: 13.5 G/DL (ref 12–16)
LYMPHOCYTES ABSOLUTE: 1 /ΜL
LYMPHOCYTES RELATIVE PERCENT: 20.4 %
MCH RBC QN AUTO: 30.1 PG
MCHC RBC AUTO-ENTMCNC: 34.2 G/DL
MCV RBC AUTO: 88.2 FL
MONOCYTES ABSOLUTE: 0.7 /ΜL
MONOCYTES RELATIVE PERCENT: 12.8 %
NEUTROPHILS ABSOLUTE: 3.3 /ΜL
NEUTROPHILS RELATIVE PERCENT: 64.8 %
PLATELET # BLD: 372 K/ΜL
PMV BLD AUTO: 10.1 FL
RBC # BLD: 4.48 10^6/ΜL
WBC # BLD: 5.1 10^3/ML

## 2019-11-22 ENCOUNTER — TELEPHONE (OUTPATIENT)
Dept: ONCOLOGY | Age: 49
End: 2019-11-22

## 2019-11-22 DIAGNOSIS — D50.9 IRON DEFICIENCY ANEMIA, UNSPECIFIED IRON DEFICIENCY ANEMIA TYPE: Primary | ICD-10-CM

## 2019-11-22 PROBLEM — I10 HYPERTENSIVE DISORDER: Status: ACTIVE | Noted: 2017-05-11

## 2019-11-22 PROBLEM — M25.569 KNEE PAIN: Status: ACTIVE | Noted: 2017-05-11

## 2019-11-25 LAB
BASOPHILS ABSOLUTE: 0 /ΜL
BASOPHILS RELATIVE PERCENT: 0.4 %
EOSINOPHILS ABSOLUTE: 0 /ΜL
EOSINOPHILS RELATIVE PERCENT: 1 %
HCT VFR BLD CALC: 38.1 % (ref 36–46)
HEMOGLOBIN: 13 G/DL (ref 12–16)
LYMPHOCYTES ABSOLUTE: 0.8 /ΜL
LYMPHOCYTES RELATIVE PERCENT: 17.6 %
MCH RBC QN AUTO: 293.1 PG
MCHC RBC AUTO-ENTMCNC: 34.1 G/DL
MCV RBC AUTO: 85.2 FL
MONOCYTES ABSOLUTE: 0.9 /ΜL
MONOCYTES RELATIVE PERCENT: 18.7 %
NEUTROPHILS ABSOLUTE: 3 /ΜL
NEUTROPHILS RELATIVE PERCENT: 62.1 %
PLATELET # BLD: 337 K/ΜL
PMV BLD AUTO: 10.5 FL
RBC # BLD: 4.47 10^6/ΜL
WBC # BLD: 4.8 10^3/ML

## 2020-02-11 LAB
BASOPHILS ABSOLUTE: 0 /ΜL
BASOPHILS RELATIVE PERCENT: 0.7 %
EOSINOPHILS ABSOLUTE: 0.1 /ΜL
EOSINOPHILS RELATIVE PERCENT: 2 %
FERRITIN: 16 NG/ML (ref 9–150)
HCT VFR BLD CALC: 37.3 % (ref 36–46)
HEMOGLOBIN: 12.5 G/DL (ref 12–16)
LYMPHOCYTES ABSOLUTE: 0.9 /ΜL
LYMPHOCYTES RELATIVE PERCENT: 19.2 %
MCH RBC QN AUTO: 28.3 PG
MCHC RBC AUTO-ENTMCNC: 33.5 G/DL
MCV RBC AUTO: 84.6 FL
MONOCYTES ABSOLUTE: 0.6 /ΜL
MONOCYTES RELATIVE PERCENT: 12.8 %
NEUTROPHILS ABSOLUTE: 2.9 /ΜL
NEUTROPHILS RELATIVE PERCENT: 64.9 %
PLATELET # BLD: 340 K/ΜL
PMV BLD AUTO: 10 FL
RBC # BLD: 4.41 10^6/ΜL
WBC # BLD: 4.5 10^3/ML

## 2020-02-13 ENCOUNTER — TELEPHONE (OUTPATIENT)
Dept: ONCOLOGY | Age: 50
End: 2020-02-13

## 2020-02-13 NOTE — TELEPHONE ENCOUNTER
Spoke with patient regarding this encounter that we need to wait on authorization first than will call to set up and she voiced understanding.

## 2020-02-20 RX ORDER — 0.9 % SODIUM CHLORIDE 0.9 %
10 VIAL (ML) INJECTION ONCE
Status: CANCELLED | OUTPATIENT
Start: 2020-02-28

## 2020-02-20 RX ORDER — METHYLPREDNISOLONE SODIUM SUCCINATE 125 MG/2ML
125 INJECTION, POWDER, LYOPHILIZED, FOR SOLUTION INTRAMUSCULAR; INTRAVENOUS ONCE
Status: CANCELLED | OUTPATIENT
Start: 2020-02-28

## 2020-02-20 RX ORDER — SODIUM CHLORIDE 9 MG/ML
INJECTION, SOLUTION INTRAVENOUS CONTINUOUS
Status: CANCELLED | OUTPATIENT
Start: 2020-02-21

## 2020-02-20 RX ORDER — SODIUM CHLORIDE 0.9 % (FLUSH) 0.9 %
10 SYRINGE (ML) INJECTION PRN
Status: CANCELLED | OUTPATIENT
Start: 2020-02-21

## 2020-02-20 RX ORDER — SODIUM CHLORIDE 9 MG/ML
INJECTION, SOLUTION INTRAVENOUS ONCE
Status: CANCELLED | OUTPATIENT
Start: 2020-02-28

## 2020-02-20 RX ORDER — SODIUM CHLORIDE 0.9 % (FLUSH) 0.9 %
20 SYRINGE (ML) INJECTION PRN
Status: CANCELLED | OUTPATIENT
Start: 2020-02-20

## 2020-02-20 RX ORDER — SCOPOLAMINE 1 MG/3 DAY
PATCH,TRANSDERMAL 3 DAY TRANSDERMAL
Qty: 4 PATCH | Refills: 3 | Status: SHIPPED | OUTPATIENT
Start: 2020-02-20 | End: 2022-02-18 | Stop reason: SDUPTHER

## 2020-02-20 RX ORDER — DIPHENHYDRAMINE HYDROCHLORIDE 50 MG/ML
50 INJECTION INTRAMUSCULAR; INTRAVENOUS ONCE
Status: CANCELLED | OUTPATIENT
Start: 2020-02-28

## 2020-02-20 RX ORDER — HEPARIN SODIUM (PORCINE) LOCK FLUSH IV SOLN 100 UNIT/ML 100 UNIT/ML
500 SOLUTION INTRAVENOUS PRN
Status: CANCELLED | OUTPATIENT
Start: 2020-02-28

## 2020-02-20 RX ORDER — 0.9 % SODIUM CHLORIDE 0.9 %
10 VIAL (ML) INJECTION ONCE
Status: CANCELLED | OUTPATIENT
Start: 2020-02-21

## 2020-02-20 RX ORDER — HEPARIN SODIUM (PORCINE) LOCK FLUSH IV SOLN 100 UNIT/ML 100 UNIT/ML
500 SOLUTION INTRAVENOUS PRN
Status: CANCELLED | OUTPATIENT
Start: 2020-02-21

## 2020-02-20 RX ORDER — SODIUM CHLORIDE 0.9 % (FLUSH) 0.9 %
10 SYRINGE (ML) INJECTION PRN
Status: CANCELLED | OUTPATIENT
Start: 2020-02-20

## 2020-02-20 RX ORDER — SODIUM CHLORIDE 0.9 % (FLUSH) 0.9 %
5 SYRINGE (ML) INJECTION PRN
Status: CANCELLED | OUTPATIENT
Start: 2020-02-28

## 2020-02-20 RX ORDER — SODIUM CHLORIDE 0.9 % (FLUSH) 0.9 %
5 SYRINGE (ML) INJECTION PRN
Status: CANCELLED | OUTPATIENT
Start: 2020-02-21

## 2020-02-20 RX ORDER — METHYLPREDNISOLONE SODIUM SUCCINATE 125 MG/2ML
125 INJECTION, POWDER, LYOPHILIZED, FOR SOLUTION INTRAMUSCULAR; INTRAVENOUS ONCE
Status: CANCELLED | OUTPATIENT
Start: 2020-02-21

## 2020-02-20 RX ORDER — SODIUM CHLORIDE 0.9 % (FLUSH) 0.9 %
10 SYRINGE (ML) INJECTION PRN
Status: CANCELLED | OUTPATIENT
Start: 2020-02-28

## 2020-02-20 RX ORDER — SODIUM CHLORIDE 9 MG/ML
INJECTION, SOLUTION INTRAVENOUS CONTINUOUS
Status: CANCELLED | OUTPATIENT
Start: 2020-02-28

## 2020-02-20 RX ORDER — HEPARIN SODIUM (PORCINE) LOCK FLUSH IV SOLN 100 UNIT/ML 100 UNIT/ML
500 SOLUTION INTRAVENOUS PRN
Status: CANCELLED | OUTPATIENT
Start: 2020-02-20

## 2020-02-20 RX ORDER — DIPHENHYDRAMINE HYDROCHLORIDE 50 MG/ML
50 INJECTION INTRAMUSCULAR; INTRAVENOUS ONCE
Status: CANCELLED | OUTPATIENT
Start: 2020-02-21

## 2020-02-21 ENCOUNTER — HOSPITAL ENCOUNTER (OUTPATIENT)
Dept: INFUSION THERAPY | Age: 50
Discharge: HOME | End: 2020-02-21
Payer: COMMERCIAL

## 2020-02-21 VITALS
TEMPERATURE: 98.2 F | HEART RATE: 62 BPM | DIASTOLIC BLOOD PRESSURE: 83 MMHG | RESPIRATION RATE: 18 BRPM | WEIGHT: 259.6 LBS | BODY MASS INDEX: 38.34 KG/M2 | OXYGEN SATURATION: 99 % | SYSTOLIC BLOOD PRESSURE: 129 MMHG

## 2020-02-21 DIAGNOSIS — K90.9 MALABSORPTION OF IRON: Primary | ICD-10-CM

## 2020-02-21 DIAGNOSIS — D50.9 IRON DEFICIENCY ANEMIA, UNSPECIFIED IRON DEFICIENCY ANEMIA TYPE: ICD-10-CM

## 2020-02-21 PROCEDURE — 2580000003 HC RX 258: Performed by: INTERNAL MEDICINE

## 2020-02-21 PROCEDURE — 6360000002 HC RX W HCPCS: Performed by: INTERNAL MEDICINE

## 2020-02-21 PROCEDURE — 96366 THER/PROPH/DIAG IV INF ADDON: CPT

## 2020-02-21 PROCEDURE — 96365 THER/PROPH/DIAG IV INF INIT: CPT

## 2020-02-21 RX ORDER — SODIUM CHLORIDE 9 MG/ML
INJECTION, SOLUTION INTRAVENOUS ONCE
Status: COMPLETED | OUTPATIENT
Start: 2020-02-21 | End: 2020-02-21

## 2020-02-21 RX ORDER — HYDROCODONE BITARTRATE AND ACETAMINOPHEN 5; 325 MG/1; MG/1
1 TABLET ORAL EVERY 6 HOURS PRN
COMMUNITY
End: 2020-05-08

## 2020-02-21 RX ADMIN — IRON SUCROSE 300 MG: 20 INJECTION, SOLUTION INTRAVENOUS at 10:27

## 2020-02-21 RX ADMIN — SODIUM CHLORIDE: 9 INJECTION, SOLUTION INTRAVENOUS at 10:26

## 2020-02-21 ASSESSMENT — PAIN SCALES - GENERAL: PAINLEVEL_OUTOF10: 0

## 2020-02-21 NOTE — PLAN OF CARE
Problem: Musculor/Skeletal Functional Status  Goal: Absence of falls  Outcome: Met This Shift  Note:   No falls this admission   Intervention: Fall precautions  Note:   Patient aware of fall precautions for here and at home -call light in reach while here       Problem: Intellectual/Education/Knowledge Deficit  Goal: Teaching initiated upon admission  Outcome: Met This Shift  Note:   Reviewed venofer with patient, patient offered no questions or concerns. Patient verbalized understanding of drug being administered. Goal: Written Disposition Instruction form completed  Outcome: Met This Shift  Note:   Discharge instructions given and reviewed with patient. All questions answered. Patient verbalized understanding   Intervention: Verbal/written education provided  Note:   Discharge instruction sheets     Problem: Discharge Planning  Goal: Knowledge of discharge instructions  Description  Knowledge of discharge instructions     Outcome: Met This Shift  Note:   Patient  able to teach back follow up appointments and when to call the doctor. Patient offers no questions at this time   Intervention: Interaction with patient/family and care team  Note:   Patient and family currently denies any needs or concerns    Intervention: Discharge to appropriate level of care  Note:   Discharge home     Problem: Discharge Planning  Goal: Knowledge of discharge instructions  Description  Knowledge of discharge instructions     Outcome: Met This Shift  Note:   Patient  able to teach back follow up appointments and when to call the doctor. Patient offers no questions at this time   Intervention: Interaction with patient/family and care team  Note:   Patient and family currently denies any needs or concerns    Intervention: Discharge to appropriate level of care  Note:   Discharge home    Care plan reviewed with patient and she verbalized understanding of the plan of care and contributed to goal setting.

## 2020-02-21 NOTE — PROGRESS NOTES
Patient assessed for the following post venofer infusion :    Dizziness   No  Lightheadedness  No      Acute nausea/vomiting No  Headache   No   chest pain/pressure  No  Rash/itching   No  Shortness of breath  No    Patient kept for 20 minutes observation post infusion. Patient tolerated venofer  without any complications. Last vital signs:   /83   Pulse 62   Temp 98.2 °F (36.8 °C) (Oral)   Resp 18   Wt 259 lb 9.6 oz (117.8 kg)   SpO2 99%   BMI 38.34 kg/m²       Patient instructed if experience any of the above symptoms following today's infusion,she is to notify MD immediately or go to the emergency department. Discharge instructions given to patient. Verbalizes understanding. Ambulated off unit per self  with belongings.

## 2020-02-28 ENCOUNTER — HOSPITAL ENCOUNTER (OUTPATIENT)
Dept: INFUSION THERAPY | Age: 50
Discharge: HOME | End: 2020-02-28
Payer: COMMERCIAL

## 2020-02-28 VITALS
SYSTOLIC BLOOD PRESSURE: 132 MMHG | TEMPERATURE: 97.8 F | HEART RATE: 72 BPM | OXYGEN SATURATION: 100 % | WEIGHT: 262.6 LBS | DIASTOLIC BLOOD PRESSURE: 73 MMHG | BODY MASS INDEX: 38.78 KG/M2 | RESPIRATION RATE: 18 BRPM

## 2020-02-28 DIAGNOSIS — K90.9 MALABSORPTION OF IRON: Primary | ICD-10-CM

## 2020-02-28 DIAGNOSIS — D50.9 IRON DEFICIENCY ANEMIA, UNSPECIFIED IRON DEFICIENCY ANEMIA TYPE: ICD-10-CM

## 2020-02-28 PROCEDURE — 96366 THER/PROPH/DIAG IV INF ADDON: CPT

## 2020-02-28 PROCEDURE — 96365 THER/PROPH/DIAG IV INF INIT: CPT

## 2020-02-28 PROCEDURE — 2580000003 HC RX 258: Performed by: INTERNAL MEDICINE

## 2020-02-28 PROCEDURE — 6360000002 HC RX W HCPCS: Performed by: INTERNAL MEDICINE

## 2020-02-28 RX ORDER — SODIUM CHLORIDE 9 MG/ML
INJECTION, SOLUTION INTRAVENOUS ONCE
Status: COMPLETED | OUTPATIENT
Start: 2020-02-28 | End: 2020-02-28

## 2020-02-28 RX ADMIN — IRON SUCROSE 300 MG: 20 INJECTION, SOLUTION INTRAVENOUS at 13:48

## 2020-02-28 RX ADMIN — SODIUM CHLORIDE: 9 INJECTION, SOLUTION INTRAVENOUS at 13:46

## 2020-02-28 NOTE — PLAN OF CARE
Problem: Musculor/Skeletal Functional Status  Goal: Absence of falls  Outcome: Met This Shift  Note:   Patient free of falls this visit. Intervention: Fall precautions  Note:   Fall risks assessed. Precautions discussed. Call light within reach during visit. Problem: Intellectual/Education/Knowledge Deficit  Goal: Teaching initiated upon admission  Outcome: Met This Shift  Note:   Patient verbalizes understanding to verbal information given on venofer infusion, including action and possible side effects. Aware to call MD if develop complications. Intervention: Verbal/written education provided  Note:   Verbal education provided on venofer infusion prior to administration. Problem: Discharge Planning  Goal: Knowledge of discharge instructions  Description  Knowledge of discharge instructions     Outcome: Met This Shift  Note:   Patient verbalizes understanding of discharge instructions, follow up appointment, and when to call physician if needed   Intervention: Interaction with patient/family and care team  Note:   Discharge instructions given to pt and reviewed. Follow up appointments discussed. Care plan reviewed with patient. Patient verbalizes understanding of the plan of care and contributes to goal setting.

## 2020-02-28 NOTE — PROGRESS NOTES
Patient assessed for the following post venofer infusion:    Dizziness   No  Lightheadedness  No      Acute nausea/vomiting No  Headache   No  Chest pain/pressure  No  Rash/itching   No  Shortness of breath  No    Patient kept for 20 minutes observation post infusion. Patient tolerated venofer infusion without any complications. Last vital signs:   /73   Pulse 72   Temp 97.8 °F (36.6 °C) (Oral)   Resp 18   Wt 262 lb 9.6 oz (119.1 kg)   SpO2 100%   BMI 38.78 kg/m²       Patient instructed if experience any of the above symptoms following today's infusion, she is to notify MD immediately or go to the emergency department. Discharge instructions given to patient. Verbalizes understanding. Ambulated off unit per self with belongings.

## 2020-03-04 RX ORDER — METHYLPREDNISOLONE SODIUM SUCCINATE 125 MG/2ML
125 INJECTION, POWDER, LYOPHILIZED, FOR SOLUTION INTRAMUSCULAR; INTRAVENOUS ONCE
Status: CANCELLED | OUTPATIENT
Start: 2020-03-06

## 2020-03-04 RX ORDER — HEPARIN SODIUM (PORCINE) LOCK FLUSH IV SOLN 100 UNIT/ML 100 UNIT/ML
500 SOLUTION INTRAVENOUS PRN
Status: CANCELLED | OUTPATIENT
Start: 2020-03-06

## 2020-03-04 RX ORDER — 0.9 % SODIUM CHLORIDE 0.9 %
10 VIAL (ML) INJECTION ONCE
Status: CANCELLED | OUTPATIENT
Start: 2020-03-06

## 2020-03-04 RX ORDER — SODIUM CHLORIDE 0.9 % (FLUSH) 0.9 %
10 SYRINGE (ML) INJECTION PRN
Status: CANCELLED | OUTPATIENT
Start: 2020-03-06

## 2020-03-04 RX ORDER — DIPHENHYDRAMINE HYDROCHLORIDE 50 MG/ML
50 INJECTION INTRAMUSCULAR; INTRAVENOUS ONCE
Status: CANCELLED | OUTPATIENT
Start: 2020-03-06

## 2020-03-04 RX ORDER — SODIUM CHLORIDE 9 MG/ML
INJECTION, SOLUTION INTRAVENOUS CONTINUOUS
Status: CANCELLED | OUTPATIENT
Start: 2020-03-06

## 2020-03-04 RX ORDER — SODIUM CHLORIDE 0.9 % (FLUSH) 0.9 %
5 SYRINGE (ML) INJECTION PRN
Status: CANCELLED | OUTPATIENT
Start: 2020-03-06

## 2020-03-06 ENCOUNTER — HOSPITAL ENCOUNTER (OUTPATIENT)
Dept: INFUSION THERAPY | Age: 50
Discharge: HOME | End: 2020-03-06
Payer: COMMERCIAL

## 2020-03-06 ENCOUNTER — TELEPHONE (OUTPATIENT)
Dept: INFUSION THERAPY | Age: 50
End: 2020-03-06

## 2020-03-06 VITALS
HEIGHT: 70 IN | TEMPERATURE: 97.5 F | WEIGHT: 271 LBS | SYSTOLIC BLOOD PRESSURE: 117 MMHG | OXYGEN SATURATION: 100 % | HEART RATE: 67 BPM | RESPIRATION RATE: 18 BRPM | BODY MASS INDEX: 38.8 KG/M2 | DIASTOLIC BLOOD PRESSURE: 65 MMHG

## 2020-03-06 DIAGNOSIS — K90.9 MALABSORPTION OF IRON: Primary | ICD-10-CM

## 2020-03-06 DIAGNOSIS — D50.9 IRON DEFICIENCY ANEMIA, UNSPECIFIED IRON DEFICIENCY ANEMIA TYPE: ICD-10-CM

## 2020-03-06 PROCEDURE — 96365 THER/PROPH/DIAG IV INF INIT: CPT

## 2020-03-06 PROCEDURE — 2580000003 HC RX 258: Performed by: INTERNAL MEDICINE

## 2020-03-06 PROCEDURE — 96366 THER/PROPH/DIAG IV INF ADDON: CPT

## 2020-03-06 PROCEDURE — 6360000002 HC RX W HCPCS: Performed by: INTERNAL MEDICINE

## 2020-03-06 RX ORDER — SODIUM CHLORIDE 9 MG/ML
INJECTION, SOLUTION INTRAVENOUS ONCE
Status: COMPLETED | OUTPATIENT
Start: 2020-03-06 | End: 2020-03-06

## 2020-03-06 RX ADMIN — IRON SUCROSE 300 MG: 20 INJECTION, SOLUTION INTRAVENOUS at 13:39

## 2020-03-06 RX ADMIN — SODIUM CHLORIDE: 9 INJECTION, SOLUTION INTRAVENOUS at 13:37

## 2020-03-06 ASSESSMENT — PAIN SCALES - GENERAL: PAINLEVEL_OUTOF10: 1

## 2020-03-06 ASSESSMENT — PAIN DESCRIPTION - PAIN TYPE: TYPE: ACUTE PAIN

## 2020-03-06 ASSESSMENT — PAIN DESCRIPTION - LOCATION: LOCATION: LEG

## 2020-03-06 NOTE — PLAN OF CARE
Care plan reviewed with patient. Patient  verbalized understanding of the plan of care and contribute to goal setting. Problem: Intellectual/Education/Knowledge Deficit  Goal: Teaching initiated upon admission  Outcome: Met This Shift  Note:   Patient verbalizes understanding to verbal information given on venofer,action and possible side effects. Aware to call MD if develop complications. Intervention: Verbal/written education provided  Note:   Discuss venofer     Problem: Discharge Planning  Goal: Knowledge of discharge instructions  Description  Knowledge of discharge instructions     Outcome: Met This Shift  Note:   Verbalized understanding of discharge instructions, follow ups and when to call doctor   Intervention: Discharge to appropriate level of care  Note:   Discuss discharge instructions, follow ups and when to call doctor. Problem: Falls - Risk of:  Goal: Will remain free from falls  Description  Will remain free from falls  Outcome: Met This Shift  Note:   Free from falls while in infusion center.  Verbalized understanding of fall prevention and to ask for assistance with ambulation   Intervention: Assess risk factors for falls  Description  Assess risk factors for falls  Note:   Assess for fall risk, instruct to ask for assistance with ambulation

## 2020-03-06 NOTE — PROGRESS NOTES
Patient assessed for the following post Venofer  :    Dizziness   No  Lightheadedness  No      Acute nausea/vomiting No  Headache   No  Chest pain/pressure  No  Rash/itching   No  Shortness of breath  No    Patient kept for 20 minutes observation post infusion. Patient tolerated Venofer without any complications. Last vital signs:   /65   Pulse 67   Temp 97.5 °F (36.4 °C) (Oral)   Resp 18   Ht 5' 10\" (1.778 m)   Wt 271 lb (122.9 kg)   SpO2 100%   BMI 38.88 kg/m²         Patient instructed if experience any of the above symptoms following today's infusion,he/she is to notify MD immediately or go to the emergency department. Discharge instructions given to patient. Verbalizes understanding. Ambulated off unit per self with belongings.

## 2020-03-24 ENCOUNTER — TELEPHONE (OUTPATIENT)
Dept: ONCOLOGY | Age: 50
End: 2020-03-24

## 2020-03-24 LAB
BASOPHILS ABSOLUTE: 0 /ΜL
BASOPHILS RELATIVE PERCENT: 0.8 %
EOSINOPHILS ABSOLUTE: 0.1 /ΜL
EOSINOPHILS RELATIVE PERCENT: 1.2 %
HCT VFR BLD CALC: 38.7 % (ref 36–46)
HEMOGLOBIN: 13.1 G/DL (ref 12–16)
LYMPHOCYTES ABSOLUTE: 1.3 /ΜL
LYMPHOCYTES RELATIVE PERCENT: 26.7 %
MCH RBC QN AUTO: 28.9 PG
MCHC RBC AUTO-ENTMCNC: 33.9 G/DL
MCV RBC AUTO: 85.4 FL
MONOCYTES ABSOLUTE: 0.8 /ΜL
MONOCYTES RELATIVE PERCENT: 17.3 %
NEUTROPHILS ABSOLUTE: 2.6 /ΜL
NEUTROPHILS RELATIVE PERCENT: 53.8 %
PLATELET # BLD: 446 K/ΜL
PMV BLD AUTO: 9.9 FL
RBC # BLD: 4.53 10^6/ΜL
WBC # BLD: 4.9 10^3/ML

## 2020-03-24 NOTE — TELEPHONE ENCOUNTER
Patient wants to know when she should get her labs rechecked? She feels like her energy is low. Plus, she is wanting an appointment to see you again in May or June when ever you think.

## 2020-04-28 ENCOUNTER — TELEPHONE (OUTPATIENT)
Dept: ONCOLOGY | Age: 50
End: 2020-04-28

## 2020-04-29 ENCOUNTER — HOSPITAL ENCOUNTER (OUTPATIENT)
Dept: INFUSION THERAPY | Age: 50
Discharge: HOME | End: 2020-04-29
Payer: COMMERCIAL

## 2020-04-29 ENCOUNTER — OFFICE VISIT (OUTPATIENT)
Dept: ONCOLOGY | Age: 50
End: 2020-04-29
Payer: COMMERCIAL

## 2020-04-29 VITALS
DIASTOLIC BLOOD PRESSURE: 85 MMHG | OXYGEN SATURATION: 99 % | RESPIRATION RATE: 18 BRPM | BODY MASS INDEX: 37.05 KG/M2 | TEMPERATURE: 97.6 F | HEART RATE: 85 BPM | HEIGHT: 70 IN | SYSTOLIC BLOOD PRESSURE: 138 MMHG | WEIGHT: 258.8 LBS

## 2020-04-29 DIAGNOSIS — D50.9 IRON DEFICIENCY ANEMIA, UNSPECIFIED IRON DEFICIENCY ANEMIA TYPE: ICD-10-CM

## 2020-04-29 LAB
FERRITIN: 147 NG/ML (ref 10–291)
HCT VFR BLD CALC: 38.1 % (ref 37–47)
HEMOGLOBIN: 13 GM/DL (ref 12–16)
IRON: 101 UG/DL (ref 50–170)
MCH RBC QN AUTO: 28.3 PG (ref 26–33)
MCHC RBC AUTO-ENTMCNC: 34.1 GM/DL (ref 32.2–35.5)
MCV RBC AUTO: 83 FL (ref 81–99)
PDW BLD-RTO: 13.4 % (ref 11.5–14.5)
PLATELET # BLD: 295 THOU/MM3 (ref 130–400)
PMV BLD AUTO: 9 FL (ref 9.4–12.4)
RBC # BLD: 4.59 MILL/MM3 (ref 4.2–5.4)
TOTAL IRON BINDING CAPACITY: 327 UG/DL (ref 171–450)
TSH SERPL DL<=0.05 MIU/L-ACNC: 1.41 UIU/ML (ref 0.4–4.2)
WBC # BLD: 3.6 THOU/MM3 (ref 4.8–10.8)

## 2020-04-29 PROCEDURE — 84436 ASSAY OF TOTAL THYROXINE: CPT

## 2020-04-29 PROCEDURE — 99211 OFF/OP EST MAY X REQ PHY/QHP: CPT

## 2020-04-29 PROCEDURE — 83540 ASSAY OF IRON: CPT

## 2020-04-29 PROCEDURE — 85027 COMPLETE CBC AUTOMATED: CPT

## 2020-04-29 PROCEDURE — 36415 COLL VENOUS BLD VENIPUNCTURE: CPT

## 2020-04-29 PROCEDURE — 99213 OFFICE O/P EST LOW 20 MIN: CPT | Performed by: INTERNAL MEDICINE

## 2020-04-29 PROCEDURE — 83550 IRON BINDING TEST: CPT

## 2020-04-29 PROCEDURE — 82728 ASSAY OF FERRITIN: CPT

## 2020-04-29 PROCEDURE — 84443 ASSAY THYROID STIM HORMONE: CPT

## 2020-04-29 RX ORDER — HYDROXYCHLOROQUINE SULFATE 200 MG/1
200 TABLET, FILM COATED ORAL 2 TIMES DAILY
COMMUNITY

## 2020-04-30 LAB — THYROXINE (T4): 7.5 UG/DL (ref 4.5–12)

## 2020-04-30 RX ORDER — SODIUM CHLORIDE 0.9 % (FLUSH) 0.9 %
5 SYRINGE (ML) INJECTION PRN
Status: CANCELLED | OUTPATIENT
Start: 2020-05-01

## 2020-04-30 RX ORDER — SODIUM CHLORIDE 0.9 % (FLUSH) 0.9 %
10 SYRINGE (ML) INJECTION PRN
Status: CANCELLED | OUTPATIENT
Start: 2020-05-01

## 2020-04-30 RX ORDER — HEPARIN SODIUM (PORCINE) LOCK FLUSH IV SOLN 100 UNIT/ML 100 UNIT/ML
500 SOLUTION INTRAVENOUS PRN
Status: CANCELLED | OUTPATIENT
Start: 2020-05-01

## 2020-04-30 RX ORDER — METHYLPREDNISOLONE SODIUM SUCCINATE 125 MG/2ML
125 INJECTION, POWDER, LYOPHILIZED, FOR SOLUTION INTRAMUSCULAR; INTRAVENOUS ONCE
Status: CANCELLED | OUTPATIENT
Start: 2020-05-01

## 2020-04-30 RX ORDER — DIPHENHYDRAMINE HYDROCHLORIDE 50 MG/ML
50 INJECTION INTRAMUSCULAR; INTRAVENOUS ONCE
Status: CANCELLED | OUTPATIENT
Start: 2020-05-01

## 2020-04-30 RX ORDER — SODIUM CHLORIDE 9 MG/ML
INJECTION, SOLUTION INTRAVENOUS CONTINUOUS
Status: CANCELLED | OUTPATIENT
Start: 2020-05-01

## 2020-04-30 RX ORDER — 0.9 % SODIUM CHLORIDE 0.9 %
10 VIAL (ML) INJECTION ONCE
Status: CANCELLED | OUTPATIENT
Start: 2020-05-01

## 2020-04-30 NOTE — PROGRESS NOTES
[facility]  [facility] OF [facility]  [facility] [address] [facility]  [address] [address]  Dept: [phone]  Dept Fax: [phone]  Loc: [phone]    Subjective:      Chief Complaint:   Kirill Adkins is a 48 y.o. female with a history of anemia. The patient has a history of gastric bypass surgery and has previously been diagnosed with iron deficiency. Her iron deficiency is felt to be secondary to poor absorption due to her previous surgery. HPI:    Saul returns today for follow-up regarding her history of anemia. 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 She requires intermittent Venofer therapy to maintain adequate iron stores as well as adequate hemoglobin and hematocrit levels. On evaluation today she states her chief complaint is primarily been fatigue and generalized weakness. The patient does have an active practice with Dr. Noé Renner here in [facility]. She does not report any evidence of blood loss. The patient denies shortness of breath, chest pain, a change in bowel habits or a change in bladder habits. ECOG performance status is level 0.  . PMH, SH, and FH:  I reviewed the PMH, SH and FH as noted on the electronic medical record. There have been no changes as noted in the previous documentation. Review of Systems   Constitutional: Fatigue. HENT: Negative. Eyes: Negative. Respiratory: Negative. Cardiovascular: Negative. Gastrointestinal: Negative. Genitourinary: Negative. Musculoskeletal: Negative. Skin: Negative. Neurological: General weakness. Hematological: Negative. Psychiatric/Behavioral: Negative. Physical Exam:   Vitals:    04/29/20 1341   BP: 138/85   Pulse: 85   Resp: 18   Temp: 97.6 °F (36.4 °C)   SpO2: 99%   Vitals reviewed and are stable.   Constitutional: Well-developed and well-nourished. No acute distress. HENT: Normocephalic and atraumatic. Eyes: Pupils are equal and reactive. No scleral icterus. Neck: Overall appearance is symmetrical. No identifiable masses. Pulmonary: Effort normal. No respiratory distress. .  Neurological: Alert and oriented to person, place, and time. Judgment and thought content normal.  Skin: Skin is warm and dry. No rash. Psychiatric: Mood and affect appropriate for the clinical situation. Behavior is normal.      Data Analysis: The following studies were reviewed with the patient today:    Hematology 4/29/2020 3/24/2020 2/11/2020   WBC 3.6 (L) 4.9 4.5   RBC 4.59 4.53 4.41   HGB 13.0 13.1 12.5   HCT 38.1 38.7 37.3   MCV 83 85.4 84.6   RDW 13.4      446 340   Ferritin 147  16   Iron 101     TIBC 327       Assessment:   1. Microcytic anemia. 2.  Iron deficiency. 3.  Gastric bypass surgery. 4.  Malabsorption. 5.  Leukopenia. Plan:   1. Continue to monitor hemoglobin, hematocrit and ferritin levels every three months. 2.  Monitor total WBC count. 3.  Repeat Venofer therapy as needed to maintain adequate iron stores and hemoglobin/hematocrit levels. Yulissa Owens M.D. Medical Director: [facility]  Cancer [address] [facility], [address] [place] [address], [address] [place] [address] of the [facility] at [facility]      **This report has been created using voice recognition software. It may contain minor errors which are inherent in voice recognition technology. **

## 2020-05-01 ENCOUNTER — HOSPITAL ENCOUNTER (OUTPATIENT)
Dept: INFUSION THERAPY | Age: 50
Discharge: HOME | End: 2020-05-01
Payer: COMMERCIAL

## 2020-05-01 VITALS
WEIGHT: 258 LBS | RESPIRATION RATE: 18 BRPM | TEMPERATURE: 98 F | HEIGHT: 70 IN | DIASTOLIC BLOOD PRESSURE: 69 MMHG | OXYGEN SATURATION: 98 % | SYSTOLIC BLOOD PRESSURE: 123 MMHG | HEART RATE: 61 BPM | BODY MASS INDEX: 36.94 KG/M2

## 2020-05-01 DIAGNOSIS — K90.9 MALABSORPTION OF IRON: Primary | ICD-10-CM

## 2020-05-01 DIAGNOSIS — B07.9 VERRUCOUS SKIN LESION: ICD-10-CM

## 2020-05-01 DIAGNOSIS — E61.1 IRON DEFICIENCY: ICD-10-CM

## 2020-05-01 DIAGNOSIS — E55.9 VITAMIN D DEFICIENCY: ICD-10-CM

## 2020-05-01 DIAGNOSIS — D50.9 IRON DEFICIENCY ANEMIA, UNSPECIFIED IRON DEFICIENCY ANEMIA TYPE: ICD-10-CM

## 2020-05-01 DIAGNOSIS — E53.8 VITAMIN B 12 DEFICIENCY: ICD-10-CM

## 2020-05-01 PROCEDURE — 96366 THER/PROPH/DIAG IV INF ADDON: CPT

## 2020-05-01 PROCEDURE — 96365 THER/PROPH/DIAG IV INF INIT: CPT

## 2020-05-01 PROCEDURE — 6360000002 HC RX W HCPCS: Performed by: INTERNAL MEDICINE

## 2020-05-01 PROCEDURE — 2580000003 HC RX 258: Performed by: INTERNAL MEDICINE

## 2020-05-01 RX ORDER — SODIUM CHLORIDE 9 MG/ML
INJECTION, SOLUTION INTRAVENOUS ONCE
Status: COMPLETED | OUTPATIENT
Start: 2020-05-01 | End: 2020-05-01

## 2020-05-01 RX ADMIN — IRON SUCROSE 300 MG: 20 INJECTION, SOLUTION INTRAVENOUS at 11:51

## 2020-05-01 RX ADMIN — SODIUM CHLORIDE: 9 INJECTION, SOLUTION INTRAVENOUS at 11:47

## 2020-05-01 NOTE — PROGRESS NOTES
Patient assessed for the following post venofer:    Dizziness   No  Lightheadedness  No      Acute nausea/vomiting No  Headache   No  Chest pain/pressure  No  Rash/itching   No  Shortness of breath  No    Patient kept for 20 minutes observation post infusion venofer. Patient tolerated venofer infusion without any complications. Last vital signs:   /69   Pulse 61   Temp 98 °F (36.7 °C) (Oral)   Resp 18   Ht 5' 10\" (1.778 m)   Wt 258 lb (117 kg)   SpO2 98%   BMI 37.02 kg/m²         Patient instructed if experience any of the above symptoms following today's infusion,he/she is to notify MD immediately or go to the emergency department. Discharge instructions given to patient. Verbalizes understanding. Ambulated off unit per self with belongings.

## 2020-05-07 RX ORDER — DIPHENHYDRAMINE HYDROCHLORIDE 50 MG/ML
50 INJECTION INTRAMUSCULAR; INTRAVENOUS ONCE
Status: CANCELLED | OUTPATIENT
Start: 2020-05-08

## 2020-05-07 RX ORDER — SODIUM CHLORIDE 0.9 % (FLUSH) 0.9 %
10 SYRINGE (ML) INJECTION PRN
Status: CANCELLED | OUTPATIENT
Start: 2020-05-08

## 2020-05-07 RX ORDER — HEPARIN SODIUM (PORCINE) LOCK FLUSH IV SOLN 100 UNIT/ML 100 UNIT/ML
500 SOLUTION INTRAVENOUS PRN
Status: CANCELLED | OUTPATIENT
Start: 2020-05-08

## 2020-05-07 RX ORDER — SODIUM CHLORIDE 0.9 % (FLUSH) 0.9 %
5 SYRINGE (ML) INJECTION PRN
Status: CANCELLED | OUTPATIENT
Start: 2020-05-08

## 2020-05-07 RX ORDER — METHYLPREDNISOLONE SODIUM SUCCINATE 125 MG/2ML
125 INJECTION, POWDER, LYOPHILIZED, FOR SOLUTION INTRAMUSCULAR; INTRAVENOUS ONCE
Status: CANCELLED | OUTPATIENT
Start: 2020-05-08

## 2020-05-07 RX ORDER — SODIUM CHLORIDE 9 MG/ML
INJECTION, SOLUTION INTRAVENOUS CONTINUOUS
Status: CANCELLED | OUTPATIENT
Start: 2020-05-08

## 2020-05-07 RX ORDER — 0.9 % SODIUM CHLORIDE 0.9 %
10 VIAL (ML) INJECTION ONCE
Status: CANCELLED | OUTPATIENT
Start: 2020-05-08

## 2020-05-08 ENCOUNTER — HOSPITAL ENCOUNTER (OUTPATIENT)
Dept: INFUSION THERAPY | Age: 50
Discharge: HOME | End: 2020-05-08
Payer: COMMERCIAL

## 2020-05-08 VITALS
SYSTOLIC BLOOD PRESSURE: 142 MMHG | DIASTOLIC BLOOD PRESSURE: 84 MMHG | HEIGHT: 70 IN | WEIGHT: 258 LBS | HEART RATE: 75 BPM | OXYGEN SATURATION: 100 % | TEMPERATURE: 98.4 F | RESPIRATION RATE: 18 BRPM | BODY MASS INDEX: 36.94 KG/M2

## 2020-05-08 DIAGNOSIS — D50.9 IRON DEFICIENCY ANEMIA, UNSPECIFIED IRON DEFICIENCY ANEMIA TYPE: ICD-10-CM

## 2020-05-08 DIAGNOSIS — E61.1 IRON DEFICIENCY: ICD-10-CM

## 2020-05-08 DIAGNOSIS — K90.9 MALABSORPTION OF IRON: Primary | ICD-10-CM

## 2020-05-08 PROCEDURE — 96366 THER/PROPH/DIAG IV INF ADDON: CPT

## 2020-05-08 PROCEDURE — 6360000002 HC RX W HCPCS: Performed by: INTERNAL MEDICINE

## 2020-05-08 PROCEDURE — 96365 THER/PROPH/DIAG IV INF INIT: CPT

## 2020-05-08 PROCEDURE — 2580000003 HC RX 258: Performed by: INTERNAL MEDICINE

## 2020-05-08 RX ORDER — SODIUM CHLORIDE 9 MG/ML
INJECTION, SOLUTION INTRAVENOUS ONCE
Status: COMPLETED | OUTPATIENT
Start: 2020-05-08 | End: 2020-05-08

## 2020-05-08 RX ADMIN — IRON SUCROSE 300 MG: 20 INJECTION, SOLUTION INTRAVENOUS at 13:45

## 2020-05-08 RX ADMIN — SODIUM CHLORIDE: 9 INJECTION, SOLUTION INTRAVENOUS at 13:40

## 2020-05-08 NOTE — PLAN OF CARE
Problem: Intellectual/Education/Knowledge Deficit  Goal: Teaching initiated upon admission  Outcome: Met This Shift  Note: Patient verbalizes understanding to verbal information given on venofer,action and possible side effects. Aware to call MD if develop complications. Intervention: Verbal/written education provided  Note: Discuss understanding to verbal information given on venofer infusion. Problem: Discharge Planning  Goal: Knowledge of discharge instructions  Description: Knowledge of discharge instructions     Outcome: Met This Shift  Note: Verbalize understanding of discharge instructions, follow up appointments, and when to call Physician. Intervention: Discharge to appropriate level of care  Note: Provide discharge instructions. Problem: Falls - Risk of:  Goal: Will remain free from falls  Description: Will remain free from falls  Outcome: Met This Shift  Note: Free from falls while in [facility].P. Oncology. Intervention: Assess risk factors for falls  Note: Discussed the need to use the call light for assistance when getting up to ambulate. Care plan reviewed with patient. Patient verbalize understanding of the plan of care and contribute to goal setting.

## 2020-05-08 NOTE — PROGRESS NOTES
Patient assessed for the following post venofer:    Dizziness              No  Lightheadedness             No     Acute nausea/vomiting No  Headache              No  Chest pain/pressure             No  Rash/itching              No  Shortness of breath  No    Patient kept for 20 minutes observation post venofer infusion. Patient tolerated venofer infusion without any complications. Last vital signs:   BP (!) 142/84   Pulse 75   Temp 98.4 °F (36.9 °C) (Oral)   Resp 18   Ht 5' 10\" (1.778 m)   Wt 258 lb (117 kg)   SpO2 100%   BMI 37.02 kg/m²     Patient instructed if experience any of the above symptoms following today's infusion,he/she is to notify MD immediately or go to the emergency department. Discharge instructions given to patient. Verbalizes understanding. Ambulated off unit per self with belongings.

## 2020-06-29 ENCOUNTER — TELEPHONE (OUTPATIENT)
Dept: ONCOLOGY | Age: 50
End: 2020-06-29

## 2020-06-29 NOTE — TELEPHONE ENCOUNTER
Patient called and said over the last month she has seen a lot more bruising all over her body. Some have been small and others have been large. She can not figure out why.     Please advise

## 2020-06-30 ENCOUNTER — TELEPHONE (OUTPATIENT)
Dept: ONCOLOGY | Age: 50
End: 2020-06-30

## 2020-07-01 NOTE — TELEPHONE ENCOUNTER
Jens Sneed, I tried to call her and she did not . Can you call her tomorrow and let her know that the laboratory studies looks normal.  The etiology of her bruising is not entirely clear. If it is mild in nature I would probably just continue to monitor it if it something more severe than have her come into the office and see me and we will make decisions about further evaluation.   Thanks Sorin Gomes

## 2020-11-25 ENCOUNTER — HOSPITAL ENCOUNTER (OUTPATIENT)
Age: 50
Setting detail: SPECIMEN
Discharge: HOME | End: 2020-11-25
Payer: COMMERCIAL

## 2020-11-25 PROCEDURE — U0003 INFECTIOUS AGENT DETECTION BY NUCLEIC ACID (DNA OR RNA); SEVERE ACUTE RESPIRATORY SYNDROME CORONAVIRUS 2 (SARS-COV-2) (CORONAVIRUS DISEASE [COVID-19]), AMPLIFIED PROBE TECHNIQUE, MAKING USE OF HIGH THROUGHPUT TECHNOLOGIES AS DESCRIBED BY CMS-2020-01-R: HCPCS

## 2020-11-28 LAB — SARS-COV-2: DETECTED

## 2020-11-30 ENCOUNTER — VIRTUAL VISIT (OUTPATIENT)
Dept: FAMILY MEDICINE CLINIC | Age: 50
End: 2020-11-30

## 2020-11-30 PROCEDURE — 99202 OFFICE O/P NEW SF 15 MIN: CPT | Performed by: FAMILY MEDICINE

## 2020-11-30 RX ORDER — GUAIFENESIN 600 MG/1
1200 TABLET, EXTENDED RELEASE ORAL 2 TIMES DAILY
Qty: 40 TABLET | Refills: 0 | Status: SHIPPED | OUTPATIENT
Start: 2020-11-30 | End: 2020-12-10

## 2020-11-30 RX ORDER — BENZONATATE 200 MG/1
200 CAPSULE ORAL 3 TIMES DAILY PRN
Qty: 30 CAPSULE | Refills: 0 | Status: SHIPPED | OUTPATIENT
Start: 2020-11-30 | End: 2020-12-07

## 2020-11-30 NOTE — PROGRESS NOTES
2020    TELEHEALTH EVALUATION -- Audio/Visual (During TXJSM-27 public health emergency)    HPI:    Ryan Curry (:  1970) has requested an audio/video evaluation for the following concern(s):    Covid test positive 2020. Her symptoms started like sinus congestion on . She was taking Bactrim for an infection which she finished . Friday the  she started coughing, having bronchospasm, fever of 100.4. Chest tightness the left worse than rght. She has been taking Micinex and couple of her husbands [place]. She is using ProAir every 4 hours. She lost her sense of smell Thanskgiving day. Some nausea, no vomiting, no diarrhea.  has Covid    Prior to Visit Medications    Medication Sig Taking?  Authorizing Provider   guaiFENesin (MUCINEX) 600 MG extended release tablet Take 2 tablets by mouth 2 times daily for 10 days Yes Richy Soto MD   benzonatate (TESSALON) 200 MG capsule Take 1 capsule by mouth 3 times daily as needed for Cough Yes Richy Soto MD   hydroxychloroquine (PLAQUENIL) 200 MG tablet Take 200 mg by mouth 2 times daily  Historical Provider, MD   TRANSDERM-SCOP, 1.5 MG, TRANSDERMAL PATCH APPLY 1 PATCH EXTERNALLY TO THE SKIN EVERY [address] Derek Bruce MD   ondansetron (ZOFRAN) 4 MG tablet Take 1 tablet by mouth every 8 hours as needed for Nausea or Vomiting  Charline Bruce MD   vitamin C (ASCORBIC ACID) 500 MG tablet Take 1,000 mg by mouth daily  Historical Provider, MD   predniSONE (DELTASONE) 10 MG tablet Take 10 mg by mouth daily Takes 1/2 prior to injection on saturday  Historical Provider, MD   montelukast sodium (SINGULAIR) 4 MG PACK Take 4 mg by mouth nightly  Historical Provider, MD   fexofenadine (ALLEGRA ALLERGY) 180 MG tablet Take 180 mg by mouth daily  Historical Provider, MD   atenolol-chlorthalidone (TENORETIC) 50-25 MG per tablet Take 0.5 tablets by mouth daily Hasnt taken for 2 weeks/ patient checking daily and has been okay  Historical Provider, MD   modafinil (PROVIGIL) 200 MG tablet Take 1 tablet by mouth daily  Historical Provider, MD   dexlansoprazole (DEXILANT) 60 MG CPDR capsule TAKE ONE CAPSULE BY MOUTH DAILY  Arturo Po, APRN - CNP   SUMAtriptan-Naproxen Sodium (TREXIMET)  MG TABS tablet Take 1 tablet by mouth at onset of migraine  Raisa Cartwright MD   cyanocobalamin 1000 MCG/ML injection 1 cc IM every month  Raisa Cartwright MD   pseudoephedrine-guaiFENesin (MUCINEX D)  MG per tablet Take 1 tablet by mouth every 12 hours. Historical Provider, MD   Omega 3 1000 MG CAPS Take 3 tablets by mouth nightly. Historical Provider, MD   ferrous sulfate 325 (65 FE) MG EC tablet Take 325 mg by mouth daily. Historical Provider, MD   Vitamin Mixture (RADHA-C PO) Take 1 tablet by mouth daily. Historical Provider, MD   mometasone (NASONEX) 50 MCG/ACT nasal spray 2 sprays by Nasal route daily. Historical Provider, MD   Pseudoephedrine-Ibuprofen (ADVIL COLD & SINUS LIQUI-GELS)  MG CAPS Take 1 capsule by mouth 2 times daily. Historical Provider, MD   Menthol (COUGH DROPS) 10 MG LOZG Take  by mouth as needed. Historical Provider, MD   baclofen (LIORESAL) 10 MG tablet Take 10 mg by mouth 2 times daily. Historical Provider, MD   gabapentin (NEURONTIN) 100 MG capsule Take 100 mg by mouth 2 times daily. Historical Provider, MD   Cholecalciferol (VITAMIN D-3) 5000 UNITS TABS Take 4 tablets by mouth daily. Historical Provider, MD   interferon beta-1a (AVONEX) 30 MCG injection Inject 30 mcg into the muscle once a week. Historical Provider, MD   nortriptyline (PAMELOR) 10 MG capsule Take 10 mg by mouth nightly. Historical Provider, MD       Social History     Tobacco Use    Smoking status: Never Smoker    Smokeless tobacco: Never Used   Substance Use Topics    Alcohol use:  Yes     Alcohol/week: 1.0 standard drinks     Types: 1 Glasses of wine per week     Comment: .4 oz monthly    Drug use: No        Past Medical History:   Diagnosis Date    Acid reflux 2012    Hypertensive disorder 5/11/2017    Migraine headache with aura 2002    [facility]    Raynaud disease 1983    Vitamin B 12 deficiency 2000    Vitamin D deficiency 2012       PHYSICAL EXAMINATION:    Vital Signs: (As obtained by patient/caregiver or practitioner observation)    Blood pressure- 120/79 Heart rate- 79      Temperature-  99.0    Pulse oximetry-  96-98%    Constitutional: [x] Appears well-developed and well-nourished [x] No apparent distress        Mental status  [x] Alert and awake  [x] Oriented to person/place/time [x]Able to follow commands      Eyes:  EOM    [x]  Normal      HENT:   [x] Normocephalic, atraumatic. [] Mouth/Throat: Mucous membranes are moist.     External Ears [x] Normal      Neck: [x] No visualized mass     Pulmonary/Chest: [x] Respiratory effort normal.  [x] No visualized signs of difficulty breathing or respiratory distress         Musculoskeletal:     [x] Normal range of motion of neck        Neurological:        [x] No Facial Asymmetry (Cranial nerve 7 motor function) (limited exam to video visit)              Psychiatric:       [x] Normal Affect     ASSESSMENT/PLAN:    1. COVID-19 virus infection  Mucinex 1200 mg tablets 1 tablet by mouth twice daily. Tessalon Perles  Drink  plenty of water. Flonase nasal spray 2 sprays each nostril once a day. Tylenol or Aleve as needed for fever, malaise, body aching      Return in about 4 days (around 12/4/2020). Jerica Avendaño is a 48 y.o. female being evaluated by a Virtual Visit (video visit) encounter to address concerns as mentioned above. A caregiver was present when appropriate. Due to this being a TeleHealth encounter (During [facility] public health emergency), evaluation of the following organ systems was limited: Vitals/Constitutional/EENT/Resp/CV/GI//MS/Neuro/Skin/Heme-Lymph-Imm.   Pursuant to the emergency declaration under the [facility] waiver authority and the [employer] Act, this Virtual Visit was conducted with patient's (and/or legal guardian's) consent, to reduce the patient's risk of exposure to COVID-19 and provide necessary medical care. The patient (and/or legal guardian) has also been advised to contact this office for worsening conditions or problems, and seek emergency medical treatment and/or call 911 if deemed necessary. Patient identification was verified at the start of the visit: Yes    Total time spent on this encounter: Not billed by time    Services were provided through a video synchronous discussion virtually to substitute for in-person clinic visit. Patient and provider were located at their individual homes. --Eliu Delacruz MD on 11/30/2020 at 5:51 PM    An electronic signature was used to authenticate this note.

## 2020-12-03 ENCOUNTER — VIRTUAL VISIT (OUTPATIENT)
Dept: FAMILY MEDICINE CLINIC | Age: 50
End: 2020-12-03
Payer: COMMERCIAL

## 2020-12-03 PROCEDURE — 99213 OFFICE O/P EST LOW 20 MIN: CPT | Performed by: FAMILY MEDICINE

## 2020-12-03 RX ORDER — GUAIFENESIN AND DEXTROMETHORPHAN HYDROBROMIDE 1200; 60 MG/1; MG/1
1 TABLET, EXTENDED RELEASE ORAL 2 TIMES DAILY
Qty: 28 TABLET | Refills: 1 | Status: SHIPPED | OUTPATIENT
Start: 2020-12-03

## 2020-12-03 NOTE — PROGRESS NOTES
12/3/2020    TELEHEALTH EVALUATION -- Audio/Visual (During JEQLB-03 public health emergency)    HPI:    Ryan Curry (:  1970) has requested an audio/video evaluation for the following concern(s): follow up Covid    Covid test positive 2020. Her symptoms started the 27 with upper respiratory infection. .  She has not taste or smell. She is having a lot of cough that wakes her up at night. Nausea is better, no vomiting, no diarrhea.  has Covid also and he is doing better already. In the last few years every time she gets a \"bad cold\" she gets wheezing. Her mother has asthma and she wonders if she has it also. She is an ENT CNP. Yesterday she had some wheezing and would like Pulmicort. She is using AirCell every 4 hours. Her pulse ox has been 95% and above most of the time    Prior to Visit Medications    Medication Sig Taking?  Authorizing Provider   Dextromethorphan-guaiFENesin (MUCINEX DM MAXIMUM STRENGTH)  MG TB12 Take 1 tablet by mouth 2 times daily Yes Richy Soto MD   budesonide (PULMICORT FLEXHALER) 180 MCG/ACT AEPB inhaler Inhale 2 puffs into the lungs 2 times daily Yes Richy Soto MD   guaiFENesin (MUCINEX) 600 MG extended release tablet Take 2 tablets by mouth 2 times daily for 10 days  Richy Soto MD   benzonatate (TESSALON) 200 MG capsule Take 1 capsule by mouth 3 times daily as needed for Cough  Richy Soto MD   hydroxychloroquine (PLAQUENIL) 200 MG tablet Take 200 mg by mouth 2 times daily  Historical Provider, MD   TRANSDERM-SCOP, 1.5 MG, TRANSDERMAL PATCH APPLY 1 PATCH EXTERNALLY TO Jam Gallagher MD   ondansetron (ZOFRAN) 4 MG tablet Take 1 tablet by mouth every 8 hours as needed for Nausea or Vomiting  Charline Bruce MD   vitamin C (ASCORBIC ACID) 500 MG tablet Take 1,000 mg by mouth daily  Historical Provider, MD   predniSONE (DELTASONE) 10 MG tablet Take 10 mg by mouth daily Takes 1/2 prior to injection on saturday  Historical Provider, MD   montelukast sodium (SINGULAIR) 4 MG PACK Take 4 mg by mouth nightly  Historical Provider, MD   fexofenadine (ALLEGRA ALLERGY) 180 MG tablet Take 180 mg by mouth daily  Historical Provider, MD   atenolol-chlorthalidone (TENORETIC) 50-25 MG per tablet Take 0.5 tablets by mouth daily Hasnt taken for 2 weeks/ patient checking daily and has been okay  Historical Provider, MD   modafinil (PROVIGIL) 200 MG tablet Take 1 tablet by mouth daily  Historical Provider, MD   dexlansoprazole (DEXILANT) 60 MG CPDR capsule TAKE ONE CAPSULE BY MOUTH DAILY  Tarah Miranda, APRN - CNP   SUMAtriptan-Naproxen Sodium (TREXIMET)  MG TABS tablet Take 1 tablet by mouth at onset of migraine  Vandana Wyman MD   cyanocobalamin 1000 MCG/ML injection 1 cc IM every month  Vandana Wyman MD   pseudoephedrine-guaiFENesin (MUCINEX D)  MG per tablet Take 1 tablet by mouth every 12 hours. Historical Provider, MD   Omega 3 1000 MG CAPS Take 3 tablets by mouth nightly. Historical Provider, MD   ferrous sulfate 325 (65 FE) MG EC tablet Take 325 mg by mouth daily. Historical Provider, MD   Vitamin Mixture (RADHA-C PO) Take 1 tablet by mouth daily. Historical Provider, MD   mometasone (NASONEX) 50 MCG/ACT nasal spray 2 sprays by Nasal route daily. Historical Provider, MD   Pseudoephedrine-Ibuprofen (ADVIL COLD & SINUS LIQUI-GELS)  MG CAPS Take 1 capsule by mouth 2 times daily. Historical Provider, MD   Menthol (COUGH DROPS) 10 MG LOZG Take  by mouth as needed. Historical Provider, MD   baclofen (LIORESAL) 10 MG tablet Take 10 mg by mouth 2 times daily. Historical Provider, MD   gabapentin (NEURONTIN) 100 MG capsule Take 100 mg by mouth 2 times daily. Historical Provider, MD   Cholecalciferol (VITAMIN D-3) 5000 UNITS TABS Take 4 tablets by mouth daily. Historical Provider, MD   interferon beta-1a (AVONEX) 30 MCG injection Inject 30 mcg into the muscle once a week.     Historical Provider, MD this being a TeleHealth encounter (During ZKRVI-00 public health emergency), evaluation of the following organ systems was limited: Vitals/Constitutional/EENT/Resp/CV/GI//MS/Neuro/Skin/Heme-Lymph-Imm. Pursuant to the emergency declaration under the [address] [place] [address], [address] [place] [address] authority and the [employer] Act, this Virtual Visit was conducted with patient's (and/or legal guardian's) consent, to reduce the patient's risk of exposure to COVID-19 and provide necessary medical care. The patient (and/or legal guardian) has also been advised to contact this office for worsening conditions or problems, and seek emergency medical treatment and/or call 911 if deemed necessary. Patient identification was verified at the start of the visit: Yes    Total time spent on this encounter: Not billed by time    Services were provided through a video synchronous discussion virtually to substitute for in-person clinic visit. Patient and provider were located at their individual homes. --Melita Talavera MD on 12/3/2020 at 4:33 PM    An electronic signature was used to authenticate this note.

## 2021-01-26 NOTE — TELEPHONE ENCOUNTER
Please approve or deny     Last Visit Date:  12/03/2020 Virtual visit      Next Visit Date:    Visit date not found

## 2021-11-01 ENCOUNTER — TELEPHONE (OUTPATIENT)
Dept: ONCOLOGY | Age: 51
End: 2021-11-01

## 2021-11-01 RX ORDER — SODIUM CHLORIDE 0.9 % (FLUSH) 0.9 %
10 SYRINGE (ML) INJECTION PRN
Status: CANCELLED | OUTPATIENT
Start: 2021-11-10

## 2021-11-01 RX ORDER — DIPHENHYDRAMINE HYDROCHLORIDE 50 MG/ML
50 INJECTION INTRAMUSCULAR; INTRAVENOUS ONCE
Status: CANCELLED | OUTPATIENT
Start: 2021-11-10 | End: 2021-11-01

## 2021-11-01 RX ORDER — SODIUM CHLORIDE 9 MG/ML
INJECTION, SOLUTION INTRAVENOUS CONTINUOUS
Status: CANCELLED | OUTPATIENT
Start: 2021-11-10

## 2021-11-01 RX ORDER — EPINEPHRINE 1 MG/ML
0.3 INJECTION, SOLUTION, CONCENTRATE INTRAVENOUS PRN
Status: CANCELLED | OUTPATIENT
Start: 2021-11-10

## 2021-11-01 RX ORDER — 0.9 % SODIUM CHLORIDE 0.9 %
10 VIAL (ML) INJECTION ONCE
Status: CANCELLED | OUTPATIENT
Start: 2021-11-10 | End: 2021-11-01

## 2021-11-01 RX ORDER — HEPARIN SODIUM (PORCINE) LOCK FLUSH IV SOLN 100 UNIT/ML 100 UNIT/ML
500 SOLUTION INTRAVENOUS PRN
Status: CANCELLED | OUTPATIENT
Start: 2021-11-10

## 2021-11-01 RX ORDER — METHYLPREDNISOLONE SODIUM SUCCINATE 125 MG/2ML
125 INJECTION, POWDER, LYOPHILIZED, FOR SOLUTION INTRAMUSCULAR; INTRAVENOUS ONCE
Status: CANCELLED | OUTPATIENT
Start: 2021-11-10 | End: 2021-11-01

## 2021-11-01 RX ORDER — SODIUM CHLORIDE 0.9 % (FLUSH) 0.9 %
5 SYRINGE (ML) INJECTION PRN
Status: CANCELLED | OUTPATIENT
Start: 2021-11-10

## 2021-11-01 RX ORDER — SODIUM CHLORIDE 9 MG/ML
INJECTION, SOLUTION INTRAVENOUS ONCE
Status: CANCELLED | OUTPATIENT
Start: 2021-11-10

## 2021-11-01 NOTE — TELEPHONE ENCOUNTER
[employer] called in and states her Ferritin is 11(scanned into Media) and feels fatigued. States she probably needs IV Iron infusions. Last appt with you 4/29/20. Please advise.

## 2021-11-02 NOTE — TELEPHONE ENCOUNTER
LEFT A VM FOR THE PATIENT TO CALL WE NEED HER TO BRING IN HER INSURANCE CARD SHE HAS SOMETHING NEW.  PLUS, TOLD HER WE NEED TO GET HER AN APPOINTMENT

## 2021-11-09 RX ORDER — SODIUM CHLORIDE 0.9 % (FLUSH) 0.9 %
20 SYRINGE (ML) INJECTION PRN
OUTPATIENT
Start: 2021-11-09

## 2021-11-09 RX ORDER — HEPARIN SODIUM (PORCINE) LOCK FLUSH IV SOLN 100 UNIT/ML 100 UNIT/ML
500 SOLUTION INTRAVENOUS PRN
OUTPATIENT
Start: 2021-11-09

## 2021-11-09 RX ORDER — SODIUM CHLORIDE 0.9 % (FLUSH) 0.9 %
10 SYRINGE (ML) INJECTION PRN
OUTPATIENT
Start: 2021-11-09

## 2021-11-10 ENCOUNTER — HOSPITAL ENCOUNTER (OUTPATIENT)
Dept: INFUSION THERAPY | Age: 51
Discharge: HOME | End: 2021-11-10
Payer: COMMERCIAL

## 2021-11-10 ENCOUNTER — OFFICE VISIT (OUTPATIENT)
Dept: ONCOLOGY | Age: 51
End: 2021-11-10
Payer: COMMERCIAL

## 2021-11-10 VITALS
RESPIRATION RATE: 18 BRPM | WEIGHT: 256.8 LBS | HEIGHT: 70 IN | OXYGEN SATURATION: 100 % | BODY MASS INDEX: 36.76 KG/M2 | HEART RATE: 68 BPM | TEMPERATURE: 97.9 F | DIASTOLIC BLOOD PRESSURE: 80 MMHG | SYSTOLIC BLOOD PRESSURE: 123 MMHG

## 2021-11-10 VITALS
HEART RATE: 59 BPM | OXYGEN SATURATION: 99 % | RESPIRATION RATE: 18 BRPM | TEMPERATURE: 98.3 F | SYSTOLIC BLOOD PRESSURE: 113 MMHG | DIASTOLIC BLOOD PRESSURE: 72 MMHG

## 2021-11-10 DIAGNOSIS — D50.9 IRON DEFICIENCY ANEMIA, UNSPECIFIED IRON DEFICIENCY ANEMIA TYPE: ICD-10-CM

## 2021-11-10 DIAGNOSIS — K90.9 MALABSORPTION OF IRON: Primary | ICD-10-CM

## 2021-11-10 DIAGNOSIS — D50.9 IRON DEFICIENCY ANEMIA, UNSPECIFIED IRON DEFICIENCY ANEMIA TYPE: Primary | ICD-10-CM

## 2021-11-10 PROCEDURE — 96365 THER/PROPH/DIAG IV INF INIT: CPT

## 2021-11-10 PROCEDURE — G8417 CALC BMI ABV UP PARAM F/U: HCPCS | Performed by: NURSE PRACTITIONER

## 2021-11-10 PROCEDURE — 3017F COLORECTAL CA SCREEN DOC REV: CPT | Performed by: NURSE PRACTITIONER

## 2021-11-10 PROCEDURE — 99211 OFF/OP EST MAY X REQ PHY/QHP: CPT

## 2021-11-10 PROCEDURE — 1036F TOBACCO NON-USER: CPT | Performed by: NURSE PRACTITIONER

## 2021-11-10 PROCEDURE — 6360000002 HC RX W HCPCS: Performed by: INTERNAL MEDICINE

## 2021-11-10 PROCEDURE — 6360000002 HC RX W HCPCS: Performed by: NURSE PRACTITIONER

## 2021-11-10 PROCEDURE — 99215 OFFICE O/P EST HI 40 MIN: CPT | Performed by: NURSE PRACTITIONER

## 2021-11-10 PROCEDURE — 2580000003 HC RX 258: Performed by: INTERNAL MEDICINE

## 2021-11-10 PROCEDURE — 96375 TX/PRO/DX INJ NEW DRUG ADDON: CPT

## 2021-11-10 PROCEDURE — G8427 DOCREV CUR MEDS BY ELIG CLIN: HCPCS | Performed by: NURSE PRACTITIONER

## 2021-11-10 PROCEDURE — G8484 FLU IMMUNIZE NO ADMIN: HCPCS | Performed by: NURSE PRACTITIONER

## 2021-11-10 PROCEDURE — 96366 THER/PROPH/DIAG IV INF ADDON: CPT

## 2021-11-10 RX ORDER — SODIUM CHLORIDE 9 MG/ML
INJECTION, SOLUTION INTRAVENOUS ONCE
Status: COMPLETED | OUTPATIENT
Start: 2021-11-10 | End: 2021-11-10

## 2021-11-10 RX ORDER — PALONOSETRON 0.05 MG/ML
0.25 INJECTION, SOLUTION INTRAVENOUS ONCE
Status: COMPLETED | OUTPATIENT
Start: 2021-11-10 | End: 2021-11-10

## 2021-11-10 RX ADMIN — IRON SUCROSE 300 MG: 20 INJECTION, SOLUTION INTRAVENOUS at 11:09

## 2021-11-10 RX ADMIN — PALONOSETRON 0.25 MG: 0.05 INJECTION, SOLUTION INTRAVENOUS at 11:03

## 2021-11-10 RX ADMIN — SODIUM CHLORIDE: 9 INJECTION, SOLUTION INTRAVENOUS at 11:02

## 2021-11-10 NOTE — PROGRESS NOTES
Patient assessed for the following post venofer:    Dizziness   No  Lightheadedness  No      Acute nausea/vomiting No  Headache   No  Chest pain/pressure  No  Rash/itching   No  Shortness of breath  No    Patient kept for 20 minutes observation post infusion ofvenofer  Patient tolerated venofer infusion  without any complications. Last vital signs:   /72   Pulse 59   Temp 98.3 °F (36.8 °C) (Oral)   Resp 18   SpO2 99%         Patient instructed if experience any of the above symptoms following today's infusion,she is to notify MD immediately or go to the emergency department. Discharge instructions given to patient. Verbalizes understanding. Ambulated off unit with  and  with belongings.

## 2021-11-10 NOTE — PROGRESS NOTES
[facility], [address] [address]  [address] [address]  Dept: [phone]  Dept Fax: [phone]: [phone]      Visit Date:11/10/2021     Dianne Klein is a 46 y.o. female who presents today for:   Chief Complaint   Patient presents with    Follow-up     Anemia        HPI:   Dianne Klein is a 46 y.o. female who follows Dr. Rossi Saenz for anemia. Per Dr. Rsosi Saenz' note on 4/29/2020:  Dianne Klein is a 48 y.o. female with a history of anemia. The patient has a history of gastric bypass surgery and has previously been diagnosed with iron deficiency. Her iron deficiency is felt to be secondary to poor absorption due to her previous surgery. Interval History 11/10/2021:   Pt presents to the office today for follow up and evaluation of anemia. She is scheduled to start IV iron infusions today. She reports fatigue, weakness. Pt denies s/s bleeding, SOB, CP, heart palpitations, change in bowel/bladder habits, PICA. Pt requests medication for nausea as she typically uses scopolamine patch and hers fell off already. She has more at home. Alcira Sanchez states she usually is given Aloxi, order placed. PMH, SH, and FH:  I reviewed the patient's medication and allergy lists as noted on the electronic medical record. The PMH, SH, and FH were also reviewed as noted on the EMR.         Past Medical History:   Diagnosis Date    Acid reflux 2012    Hypertensive disorder 5/11/2017    Migraine headache with aura 2002    MS (multiple sclerosis) ([facility] 2002    Raynaud disease 1983    Vitamin B 12 deficiency 2000    Vitamin D deficiency 2012      Past Surgical History:   Procedure Laterality Date    APPENDECTOMY  11/2018    also had right ovary removed begn    CHOLECYSTECTOMY  2002    COLONOSCOPY  01/2012    COLONOSCOPY      every 5 years     GASTRIC BYPASS SURGERY  2000    HEMORRHOID SURGERY      banding 2012    SHOULDER SURGERY  1999 x 2    subluxation x2  TONSILLECTOMY AND ADENOIDECTOMY  1978    TYMPANOSTOMY TUBE PLACEMENT  1972    Multiple tube placements.  UPPER GASTROINTESTINAL ENDOSCOPY  01/01/12      Family History   Problem Relation Age of Onset    High Blood Pressure Mother     Heart Attack Mother     High Cholesterol Mother     Asthma Mother     Breast Cancer Mother     Obesity Mother     High Cholesterol Father     High Blood Pressure Father     Colon Cancer Father     Obesity Father     Diabetes Father         Type 2    Other Father         Acute Pancreatitis    Other Brother         kidney stones     Obesity Maternal Aunt     High Blood Pressure Maternal Aunt     Other Maternal Aunt         HIV    High Blood Pressure Paternal Aunt     Diabetes Maternal Grandmother         type 1     Other Maternal Grandmother         ITTP     Heart Attack Maternal Grandfather     Cancer Maternal Grandfather         prostate with mets     Heart Surgery Maternal Grandfather     Heart Attack Paternal Grandmother     Heart Attack Paternal Grandfather     Obesity Maternal Aunt     Heart Attack Maternal Aunt     High Blood Pressure Maternal Aunt     High Blood Pressure Paternal Aunt     Diabetes Paternal Aunt     High Blood Pressure Paternal Aunt     Diabetes Paternal [de-identified]     Other Paternal Aunt         respiratory failure due to wt     Obesity Paternal Aunt       Social History     Tobacco Use    Smoking status: Never Smoker    Smokeless tobacco: Never Used   Substance Use Topics    Alcohol use:  Yes     Alcohol/week: 1.0 standard drink     Types: 1 Glasses of wine per week     Comment: .4 oz monthly      Current Outpatient Medications   Medication Sig Dispense Refill    Zinc 15 MG CAPS Take 15 mg by mouth nightly      budesonide (PULMICORT FLEXHALER) 180 MCG/ACT AEPB inhaler Inhale 2 puffs into the lungs 2 times daily 1 each 0    Dextromethorphan-guaiFENesin (MUCINEX DM MAXIMUM STRENGTH)  MG TB12 Take 1 tablet by mouth 2 times daily 28 tablet 1    hydroxychloroquine (PLAQUENIL) 200 MG tablet Take 200 mg by mouth 2 times daily      TRANSDERM-SCOP, 1.5 MG, TRANSDERMAL PATCH APPLY 1 PATCH EXTERNALLY TO THE SKIN EVERY 72 HOURS 4 patch 3    ondansetron (ZOFRAN) 4 MG tablet Take 1 tablet by mouth every 8 hours as needed for Nausea or Vomiting 30 tablet 3    vitamin C (ASCORBIC ACID) 500 MG tablet Take 1,000 mg by mouth daily      predniSONE (DELTASONE) 10 MG tablet Take 10 mg by mouth daily Takes 1/2 prior to injection on saturday      montelukast sodium (SINGULAIR) 4 MG PACK Take 4 mg by mouth nightly      fexofenadine (ALLEGRA ALLERGY) 180 MG tablet Take 180 mg by mouth daily      modafinil (PROVIGIL) 200 MG tablet Take 1 tablet by mouth daily  5    dexlansoprazole (DEXILANT) 60 MG CPDR capsule TAKE ONE CAPSULE BY MOUTH DAILY 30 capsule 0    SUMAtriptan-Naproxen Sodium (TREXIMET)  MG TABS tablet Take 1 tablet by mouth at onset of migraine 9 tablet 5    cyanocobalamin 1000 MCG/ML injection 1 cc IM every month 12 mL 0    pseudoephedrine-guaiFENesin (MUCINEX D)  MG per tablet Take 1 tablet by mouth every 12 hours.  ferrous sulfate 325 (65 FE) MG EC tablet Take 325 mg by mouth daily.  Vitamin Mixture (RADHA-C PO) Take 1 tablet by mouth daily.  mometasone (NASONEX) 50 MCG/ACT nasal spray 2 sprays by Nasal route daily.  Pseudoephedrine-Ibuprofen (ADVIL COLD & SINUS LIQUI-GELS)  MG CAPS Take 1 capsule by mouth 2 times daily.  Menthol (COUGH DROPS) 10 MG LOZG Take  by mouth as needed.  baclofen (LIORESAL) 10 MG tablet Take 10 mg by mouth 2 times daily.  gabapentin (NEURONTIN) 100 MG capsule Take 100 mg by mouth 2 times daily.  Cholecalciferol (VITAMIN D-3) 5000 UNITS TABS Take 4 tablets by mouth daily.  interferon beta-1a (AVONEX) 30 MCG injection Inject 30 mcg into the muscle once a week.  nortriptyline (PAMELOR) 10 MG capsule Take 10 mg by mouth nightly.  atenolol-chlorthalidone (TENORETIC) 50-25 MG per tablet Take 0.5 tablets by mouth daily Hasnt taken for 2 weeks/ patient checking daily and has been okay (Patient not taking: Reported on 11/10/2021)  3     No current facility-administered medications for this visit. Facility-Administered Medications Ordered in Other Visits   Medication Dose Route Frequency Provider Last Rate Last Admin    0.9 % sodium chloride infusion   IntraVENous Once Pedro Post MD 20 mL/hr at 11/10/21 1102 New Bag at 11/10/21 1102    iron sucrose (VENOFER) 300 mg in sodium chloride 0.9 % 250 mL IVPB  300 mg IntraVENous Once Pedro Post .7 mL/hr at 11/10/21 1109 300 mg at 11/10/21 1109      Allergies   Allergen Reactions    Latex Rash    Avelox [Moxifloxacin Hydrochloride] Hives     IV Avelox only     Gluten Meal     Morphine     Betadine [Povidone Iodine] Rash          Review of Systems:   Review of Systems   Pertinent review of systems noted in HPI, all other ROS negative. Objective:   Physical Exam   /80 (Site: Left Upper Arm, Position: Sitting, Cuff Size: Medium Adult)   Pulse 68   Temp 97.9 °F (36.6 °C) (Oral)   Resp 18   Ht 5' 10\" (1.778 m)   Wt 256 lb 12.8 oz (116.5 kg)   SpO2 100%   BMI 36.85 kg/m²    General appearance: No apparent distress, calm and cooperative. HEENT: Pupils equal, round, and reactive to light. Conjunctivae/corneas clear. Oral mucosa moist.  Neck: Supple, with full range of motion. Trachea midline. Respiratory:  Normal respiratory effort. Clear to auscultation all lung fields. Cardiovascular:  RRR, S1/S2. Abdomen: Soft, non-tender, non-distended with active BS x 4. Musculoskeletal: No clubbing, cyanosis or edema bilaterally. Pt able to ambulate in office without difficulty. Skin: Skin color, texture, turgor normal.  No visible rashes or lesions. Neurologic:  Neurovascularly intact without any focal sensory/motor deficits.  Cranial nerves: II-XII intact, grossly non-focal.  Psychiatric: Alert and oriented x 3, thought content appropriate, normal insight  Capillary Refill: Brisk,< 3 seconds   Peripheral Pulses: +2 palpable, equal bilaterally       Imaging Studies and Labs:   CBC:   Lab Results   Component Value Date    WBC 3.6 (L) 04/29/2020    HGB 13.0 04/29/2020    HCT 38.1 04/29/2020    MCV 83 04/29/2020     04/29/2020     BMP:   Lab Results   Component Value Date     01/31/2014    K 4.1 01/31/2014     01/31/2014    CO2 30 01/31/2014    BUN 14 01/31/2014    CREATININE 0.6 01/31/2014    GLUCOSE 92 01/31/2014    GLUCOSE 89 01/04/2012    CALCIUM 8.7 01/31/2014      LFT:   Lab Results   Component Value Date    ALT 21 01/31/2014    AST 24 01/31/2014    ALKPHOS 51 01/31/2014    BILITOT 0.5 01/31/2014         Assessment and Plan:     1. Iron deficiency anemia, unspecified iron deficiency anemia type  Chronic history of iron deficiency anemia, IV iron transfusion dependent. Most recent ferritin 11 on 10/28/2021. IV iron infusions scheduled 11/10/2021, 11/17/2021 & 11/24/2021. Return in about 8 weeks (around 1/5/2022). All patient questions answered. Pt voiced understanding. Patient agreed with treatment plan. Follow up as directed. Patient instructed to call for questions or concerns. Electronically signed by   RAMÓN Woodruff CNP     I spent a total of 41 minutes on the day of the visit.

## 2021-11-10 NOTE — PATIENT INSTRUCTIONS
1.  IV iron infusions scheduled 11/17/2021 & 11/24/2021 -she would like these in [facility] medrano.   2.  Return to clinic 1st week in January to see Dr. Miguel Larios with labs:  CBC, ferritin

## 2021-11-16 RX ORDER — METHYLPREDNISOLONE SODIUM SUCCINATE 125 MG/2ML
125 INJECTION, POWDER, LYOPHILIZED, FOR SOLUTION INTRAMUSCULAR; INTRAVENOUS ONCE
Status: CANCELLED | OUTPATIENT
Start: 2021-11-24 | End: 2021-11-24

## 2021-11-16 RX ORDER — SODIUM CHLORIDE 0.9 % (FLUSH) 0.9 %
5 SYRINGE (ML) INJECTION PRN
Status: CANCELLED | OUTPATIENT
Start: 2021-11-17

## 2021-11-16 RX ORDER — METHYLPREDNISOLONE SODIUM SUCCINATE 125 MG/2ML
125 INJECTION, POWDER, LYOPHILIZED, FOR SOLUTION INTRAMUSCULAR; INTRAVENOUS ONCE
Status: CANCELLED | OUTPATIENT
Start: 2021-11-17 | End: 2021-11-17

## 2021-11-16 RX ORDER — 0.9 % SODIUM CHLORIDE 0.9 %
10 VIAL (ML) INJECTION ONCE
Status: CANCELLED | OUTPATIENT
Start: 2021-11-17 | End: 2021-11-17

## 2021-11-16 RX ORDER — HEPARIN SODIUM (PORCINE) LOCK FLUSH IV SOLN 100 UNIT/ML 100 UNIT/ML
500 SOLUTION INTRAVENOUS PRN
Status: CANCELLED | OUTPATIENT
Start: 2021-11-17

## 2021-11-16 RX ORDER — DIPHENHYDRAMINE HYDROCHLORIDE 50 MG/ML
50 INJECTION INTRAMUSCULAR; INTRAVENOUS ONCE
Status: CANCELLED | OUTPATIENT
Start: 2021-11-17 | End: 2021-11-17

## 2021-11-16 RX ORDER — SODIUM CHLORIDE 0.9 % (FLUSH) 0.9 %
5 SYRINGE (ML) INJECTION PRN
Status: CANCELLED | OUTPATIENT
Start: 2021-11-24

## 2021-11-16 RX ORDER — 0.9 % SODIUM CHLORIDE 0.9 %
10 VIAL (ML) INJECTION ONCE
Status: CANCELLED | OUTPATIENT
Start: 2021-11-24 | End: 2021-11-24

## 2021-11-16 RX ORDER — SODIUM CHLORIDE 0.9 % (FLUSH) 0.9 %
10 SYRINGE (ML) INJECTION PRN
Status: CANCELLED | OUTPATIENT
Start: 2021-11-17

## 2021-11-16 RX ORDER — DIPHENHYDRAMINE HYDROCHLORIDE 50 MG/ML
50 INJECTION INTRAMUSCULAR; INTRAVENOUS ONCE
Status: CANCELLED | OUTPATIENT
Start: 2021-11-24 | End: 2021-11-24

## 2021-11-16 RX ORDER — HEPARIN SODIUM (PORCINE) LOCK FLUSH IV SOLN 100 UNIT/ML 100 UNIT/ML
500 SOLUTION INTRAVENOUS PRN
Status: CANCELLED | OUTPATIENT
Start: 2021-11-24

## 2021-11-16 RX ORDER — SODIUM CHLORIDE 9 MG/ML
INJECTION, SOLUTION INTRAVENOUS CONTINUOUS
Status: CANCELLED | OUTPATIENT
Start: 2021-11-24

## 2021-11-16 RX ORDER — SODIUM CHLORIDE 0.9 % (FLUSH) 0.9 %
10 SYRINGE (ML) INJECTION PRN
Status: CANCELLED | OUTPATIENT
Start: 2021-11-24

## 2021-11-16 RX ORDER — SODIUM CHLORIDE 9 MG/ML
INJECTION, SOLUTION INTRAVENOUS CONTINUOUS
Status: CANCELLED | OUTPATIENT
Start: 2021-11-17

## 2021-11-16 RX ORDER — SODIUM CHLORIDE 9 MG/ML
INJECTION, SOLUTION INTRAVENOUS ONCE
Status: CANCELLED | OUTPATIENT
Start: 2021-11-24 | End: 2021-11-24

## 2021-11-17 ENCOUNTER — HOSPITAL ENCOUNTER (OUTPATIENT)
Dept: INFUSION THERAPY | Age: 51
Discharge: HOME | End: 2021-11-17
Payer: COMMERCIAL

## 2021-11-17 VITALS
WEIGHT: 258 LBS | DIASTOLIC BLOOD PRESSURE: 79 MMHG | HEIGHT: 70 IN | HEART RATE: 85 BPM | TEMPERATURE: 98.2 F | SYSTOLIC BLOOD PRESSURE: 133 MMHG | BODY MASS INDEX: 36.94 KG/M2 | RESPIRATION RATE: 18 BRPM | OXYGEN SATURATION: 100 %

## 2021-11-17 DIAGNOSIS — E61.1 IRON DEFICIENCY: ICD-10-CM

## 2021-11-17 DIAGNOSIS — K90.9 MALABSORPTION OF IRON: Primary | ICD-10-CM

## 2021-11-17 DIAGNOSIS — D50.9 IRON DEFICIENCY ANEMIA, UNSPECIFIED IRON DEFICIENCY ANEMIA TYPE: ICD-10-CM

## 2021-11-17 PROCEDURE — 96375 TX/PRO/DX INJ NEW DRUG ADDON: CPT

## 2021-11-17 PROCEDURE — 6360000002 HC RX W HCPCS: Performed by: INTERNAL MEDICINE

## 2021-11-17 PROCEDURE — 2580000003 HC RX 258: Performed by: INTERNAL MEDICINE

## 2021-11-17 PROCEDURE — 6360000002 HC RX W HCPCS: Performed by: NURSE PRACTITIONER

## 2021-11-17 PROCEDURE — 96365 THER/PROPH/DIAG IV INF INIT: CPT

## 2021-11-17 PROCEDURE — 96366 THER/PROPH/DIAG IV INF ADDON: CPT

## 2021-11-17 RX ORDER — PALONOSETRON 0.05 MG/ML
0.25 INJECTION, SOLUTION INTRAVENOUS ONCE
Status: COMPLETED | OUTPATIENT
Start: 2021-11-17 | End: 2021-11-17

## 2021-11-17 RX ORDER — SODIUM CHLORIDE 9 MG/ML
INJECTION, SOLUTION INTRAVENOUS ONCE
Status: COMPLETED | OUTPATIENT
Start: 2021-11-17 | End: 2021-11-17

## 2021-11-17 RX ADMIN — PALONOSETRON 0.25 MG: 0.05 INJECTION, SOLUTION INTRAVENOUS at 14:10

## 2021-11-17 RX ADMIN — IRON SUCROSE 300 MG: 20 INJECTION, SOLUTION INTRAVENOUS at 14:14

## 2021-11-17 RX ADMIN — SODIUM CHLORIDE: 9 INJECTION, SOLUTION INTRAVENOUS at 14:08

## 2021-11-17 NOTE — PROGRESS NOTES
Patient tolerated Venofer infusion without any complications. Patient kept for 20 minuets observation post venofer. Denies dizziness, lightheadedness, acute nausea or vomiting, headache, heart palpitations, rash/itching or increased SOB. Last vital signs  /79   Pulse 85   Temp 98.2 °F (36.8 °C) (Oral)   Resp 18   Ht 5' 10\" (1.778 m)   Wt 258 lb (117 kg)   SpO2 100%   BMI 37.02 kg/m²     Patient instructed if they experience any of the above symptoms following today's visit, he/she is to notify the Physician or go to the Emergency Dept. Discharge instructions given to patient, Verbalizes understanding. Ambulated off unit per self in stable condition with all belongings.

## 2021-11-17 NOTE — PLAN OF CARE
Problem: Intellectual/Education/Knowledge Deficit  Goal: Teaching initiated upon admission  Outcome: Met This Shift  Note: Patient verbalizes understanding to verbal information given on Venofer,action and possible side effects. Aware to call MD if develop complications. Intervention: Verbal/written education provided  Note: Discuss understanding to verbal information given on venofer. Problem: Discharge Planning  Goal: Knowledge of discharge instructions  Description: Knowledge of discharge instructions     Outcome: Met This Shift  Note: Verbalize understanding of discharge instructions, follow up appointments, and when to call Physician. Intervention: Discharge to appropriate level of care  Note: Discuss understanding of discharge instructions, follow up appointments and when to call Physician. Problem: Falls - Risk of:  Goal: Will remain free from falls  Description: Will remain free from falls  Outcome: Met This Shift  Note: Free from falls while in O.P. Oncology. Intervention: Assess risk factors for falls  Note: Discussed the need to use the call light for assistance when getting up to ambulate. Care plan reviewed with patient. Patient verbalize understanding of the plan of care and contribute to goal setting.

## 2021-11-24 ENCOUNTER — HOSPITAL ENCOUNTER (OUTPATIENT)
Dept: INFUSION THERAPY | Age: 51
Discharge: HOME | End: 2021-11-24
Payer: COMMERCIAL

## 2021-11-24 VITALS
TEMPERATURE: 98.4 F | DIASTOLIC BLOOD PRESSURE: 90 MMHG | SYSTOLIC BLOOD PRESSURE: 138 MMHG | BODY MASS INDEX: 37.37 KG/M2 | WEIGHT: 261 LBS | HEIGHT: 70 IN | OXYGEN SATURATION: 100 % | RESPIRATION RATE: 16 BRPM | HEART RATE: 78 BPM

## 2021-11-24 DIAGNOSIS — K90.9 MALABSORPTION OF IRON: Primary | ICD-10-CM

## 2021-11-24 DIAGNOSIS — D50.9 IRON DEFICIENCY ANEMIA, UNSPECIFIED IRON DEFICIENCY ANEMIA TYPE: ICD-10-CM

## 2021-11-24 PROCEDURE — 2580000003 HC RX 258: Performed by: INTERNAL MEDICINE

## 2021-11-24 PROCEDURE — 6360000002 HC RX W HCPCS: Performed by: INTERNAL MEDICINE

## 2021-11-24 PROCEDURE — 96375 TX/PRO/DX INJ NEW DRUG ADDON: CPT

## 2021-11-24 PROCEDURE — 6360000002 HC RX W HCPCS: Performed by: NURSE PRACTITIONER

## 2021-11-24 PROCEDURE — 96365 THER/PROPH/DIAG IV INF INIT: CPT

## 2021-11-24 PROCEDURE — 96366 THER/PROPH/DIAG IV INF ADDON: CPT

## 2021-11-24 RX ORDER — ONDANSETRON 2 MG/ML
4 INJECTION INTRAMUSCULAR; INTRAVENOUS ONCE
Status: COMPLETED | OUTPATIENT
Start: 2021-11-24 | End: 2021-11-24

## 2021-11-24 RX ORDER — SODIUM CHLORIDE 9 MG/ML
INJECTION, SOLUTION INTRAVENOUS ONCE
Status: COMPLETED | OUTPATIENT
Start: 2021-11-24 | End: 2021-11-24

## 2021-11-24 RX ADMIN — SODIUM CHLORIDE: 9 INJECTION, SOLUTION INTRAVENOUS at 15:00

## 2021-11-24 RX ADMIN — IRON SUCROSE 300 MG: 20 INJECTION, SOLUTION INTRAVENOUS at 15:16

## 2021-11-24 RX ADMIN — ONDANSETRON 4 MG: 2 INJECTION INTRAMUSCULAR; INTRAVENOUS at 15:14

## 2021-11-24 NOTE — PLAN OF CARE
Problem: Intellectual/Education/Knowledge Deficit  Goal: Teaching initiated upon admission  Outcome: Met This Shift  Note: Patient verbalizes understanding to verbal information given on Venofer and Zofran ,action and possible side effects. Aware to call MD if develop complications. Intervention: Verbal/written education provided  Note: Venofer and Zofran reviewed, patient verbalizes understanding of medication being administered and potential side effects. Problem: Discharge Planning  Goal: Knowledge of discharge instructions  Description: Knowledge of discharge instructions     Outcome: Met This Shift  Note: Verbalized understanding of discharge instructions, follow-up appointments, and when to call the physician. Intervention: Discharge to appropriate level of care  Note: Discuss understanding of discharge instructions,follow-up appointments, and when to call the physician. Problem: Falls - Risk of:  Goal: Will remain free from falls  Description: Will remain free from falls  Outcome: Met This Shift  Note: Patient free from falls while in O.P. Oncology. Intervention: Assess risk factors for falls  Description: Assess risk factors for falls  Note: Patient assessed for fall risk on admission to [facility]. Fall band placed on patient. Discussed the need to use the call light for assistance prior to getting up out of chair/bed. Care plan reviewed with patient. Patient verbalize understanding of the plan of care and contribute to goal setting.

## 2021-11-24 NOTE — PROGRESS NOTES
Patient assessed for the following post Venofer:    Dizziness   No  Lightheadedness  No      Acute nausea/vomiting No  Headache   No  Chest pain/pressure  No  Rash/itching   No  Shortness of breath  No    Patient kept for 20 minutes observation post infusion Venofer. Patient tolerated iron treatment Venofer without any complications. Last vital signs:   BP (!) 156/87   Pulse 88   Temp 98.4 °F (36.9 °C) (Oral)   Resp 16   Ht 5' 10\" (1.778 m)   Wt 261 lb (118.4 kg)   SpO2 100%   BMI 37.45 kg/m²     Patient instructed if experience any of the above symptoms following today's infusion, she is to notify MD immediately or go to the emergency department. Discharge instructions given to patient. Verbalizes understanding. Ambulated off unit per self, with belongings.

## 2022-01-05 ENCOUNTER — TELEPHONE (OUTPATIENT)
Dept: ONCOLOGY | Age: 52
End: 2022-01-05

## 2022-02-09 ENCOUNTER — HOSPITAL ENCOUNTER (OUTPATIENT)
Dept: INFUSION THERAPY | Age: 52
Discharge: HOME | End: 2022-02-09
Payer: COMMERCIAL

## 2022-02-09 ENCOUNTER — OFFICE VISIT (OUTPATIENT)
Dept: ONCOLOGY | Age: 52
End: 2022-02-09
Payer: COMMERCIAL

## 2022-02-09 VITALS
OXYGEN SATURATION: 100 % | SYSTOLIC BLOOD PRESSURE: 136 MMHG | WEIGHT: 268.4 LBS | BODY MASS INDEX: 38.42 KG/M2 | TEMPERATURE: 98.8 F | DIASTOLIC BLOOD PRESSURE: 77 MMHG | RESPIRATION RATE: 20 BRPM | HEART RATE: 76 BPM | HEIGHT: 70 IN

## 2022-02-09 DIAGNOSIS — E61.1 IRON DEFICIENCY: ICD-10-CM

## 2022-02-09 DIAGNOSIS — Z79.899 ENCOUNTER FOR MEDICATION MANAGEMENT: ICD-10-CM

## 2022-02-09 DIAGNOSIS — D50.9 IRON DEFICIENCY ANEMIA, UNSPECIFIED IRON DEFICIENCY ANEMIA TYPE: ICD-10-CM

## 2022-02-09 DIAGNOSIS — D50.9 IRON DEFICIENCY ANEMIA, UNSPECIFIED IRON DEFICIENCY ANEMIA TYPE: Primary | ICD-10-CM

## 2022-02-09 DIAGNOSIS — K90.9 MALABSORPTION OF IRON: ICD-10-CM

## 2022-02-09 DIAGNOSIS — D72.819 LEUKOPENIA, UNSPECIFIED TYPE: ICD-10-CM

## 2022-02-09 LAB
ABSOLUTE IMMATURE GRANULOCYTE: 0.01 THOU/MM3 (ref 0–0.07)
BASINOPHIL, AUTOMATED: 1 % (ref 0–3)
BASOPHILS ABSOLUTE: 0 THOU/MM3 (ref 0–0.1)
EOSINOPHILS ABSOLUTE: 0.1 THOU/MM3 (ref 0–0.4)
EOSINOPHILS RELATIVE PERCENT: 4 % (ref 0–4)
FERRITIN: 29 NG/ML (ref 10–291)
HCT VFR BLD CALC: 36.6 % (ref 37–47)
HEMOGLOBIN: 12 GM/DL (ref 12–16)
IMMATURE GRANULOCYTES: 0 %
IRON: 47 UG/DL (ref 50–170)
LYMPHOCYTES # BLD: 24 % (ref 15–47)
LYMPHOCYTES ABSOLUTE: 0.7 THOU/MM3 (ref 1–4.8)
MCH RBC QN AUTO: 27.5 PG (ref 26–33)
MCHC RBC AUTO-ENTMCNC: 32.8 GM/DL (ref 32.2–35.5)
MCV RBC AUTO: 84 FL (ref 81–99)
MONOCYTES ABSOLUTE: 0.6 THOU/MM3 (ref 0.4–1.3)
MONOCYTES: 19 % (ref 0–12)
PDW BLD-RTO: 15.5 % (ref 11.5–14.5)
PLATELET # BLD: 291 THOU/MM3 (ref 130–400)
PMV BLD AUTO: 9.2 FL (ref 9.4–12.4)
RBC # BLD: 4.37 MILL/MM3 (ref 4.2–5.4)
SEG NEUTROPHILS: 51 % (ref 43–75)
SEGMENTED NEUTROPHILS ABSOLUTE COUNT: 1.5 THOU/MM3 (ref 1.8–7.7)
TOTAL IRON BINDING CAPACITY: 353 UG/DL (ref 171–450)
WBC # BLD: 3 THOU/MM3 (ref 4.8–10.8)

## 2022-02-09 PROCEDURE — 99215 OFFICE O/P EST HI 40 MIN: CPT | Performed by: INTERNAL MEDICINE

## 2022-02-09 PROCEDURE — 83540 ASSAY OF IRON: CPT

## 2022-02-09 PROCEDURE — 85025 COMPLETE CBC W/AUTO DIFF WBC: CPT

## 2022-02-09 PROCEDURE — 99211 OFF/OP EST MAY X REQ PHY/QHP: CPT

## 2022-02-09 PROCEDURE — 82728 ASSAY OF FERRITIN: CPT

## 2022-02-09 PROCEDURE — 83550 IRON BINDING TEST: CPT

## 2022-02-09 RX ORDER — HEPARIN SODIUM (PORCINE) LOCK FLUSH IV SOLN 100 UNIT/ML 100 UNIT/ML
500 SOLUTION INTRAVENOUS PRN
Status: CANCELLED | OUTPATIENT
Start: 2022-02-10

## 2022-02-09 RX ORDER — SODIUM CHLORIDE 0.9 % (FLUSH) 0.9 %
5 SYRINGE (ML) INJECTION PRN
Status: CANCELLED | OUTPATIENT
Start: 2022-02-10

## 2022-02-09 RX ORDER — METHYLPREDNISOLONE SODIUM SUCCINATE 125 MG/2ML
125 INJECTION, POWDER, LYOPHILIZED, FOR SOLUTION INTRAMUSCULAR; INTRAVENOUS ONCE
Status: CANCELLED | OUTPATIENT
Start: 2022-02-10 | End: 2022-02-09

## 2022-02-09 RX ORDER — SODIUM CHLORIDE 9 MG/ML
INJECTION, SOLUTION INTRAVENOUS CONTINUOUS
Status: CANCELLED | OUTPATIENT
Start: 2022-02-10

## 2022-02-09 RX ORDER — EPINEPHRINE 1 MG/ML
0.3 INJECTION, SOLUTION, CONCENTRATE INTRAVENOUS PRN
Status: CANCELLED | OUTPATIENT
Start: 2022-02-10

## 2022-02-09 RX ORDER — SODIUM CHLORIDE 0.9 % (FLUSH) 0.9 %
10 SYRINGE (ML) INJECTION PRN
Status: CANCELLED | OUTPATIENT
Start: 2022-02-10

## 2022-02-09 RX ORDER — 0.9 % SODIUM CHLORIDE 0.9 %
10 VIAL (ML) INJECTION ONCE
Status: CANCELLED | OUTPATIENT
Start: 2022-02-10 | End: 2022-02-09

## 2022-02-09 RX ORDER — SODIUM CHLORIDE 9 MG/ML
INJECTION, SOLUTION INTRAVENOUS ONCE
Status: CANCELLED | OUTPATIENT
Start: 2022-02-10

## 2022-02-09 RX ORDER — DIPHENHYDRAMINE HYDROCHLORIDE 50 MG/ML
50 INJECTION INTRAMUSCULAR; INTRAVENOUS ONCE
Status: CANCELLED | OUTPATIENT
Start: 2022-02-10 | End: 2022-02-09

## 2022-02-09 NOTE — PATIENT INSTRUCTIONS
1. Pre-CERT is in progress for Venofer therapy. 2. Once patient has been attending schedule medical therapy with x3 at the office in [address] [facility]. 3. Labs around the beginning of May 2022. 4. Patient will call to schedule follow-up evaluation.

## 2022-02-10 ENCOUNTER — HOSPITAL ENCOUNTER (OUTPATIENT)
Dept: INFUSION THERAPY | Age: 52
Discharge: HOME | End: 2022-02-10
Payer: COMMERCIAL

## 2022-02-10 VITALS
WEIGHT: 269.6 LBS | RESPIRATION RATE: 16 BRPM | HEART RATE: 69 BPM | BODY MASS INDEX: 38.6 KG/M2 | OXYGEN SATURATION: 100 % | TEMPERATURE: 98.1 F | DIASTOLIC BLOOD PRESSURE: 76 MMHG | HEIGHT: 70 IN | SYSTOLIC BLOOD PRESSURE: 126 MMHG

## 2022-02-10 DIAGNOSIS — D50.9 IRON DEFICIENCY ANEMIA, UNSPECIFIED IRON DEFICIENCY ANEMIA TYPE: ICD-10-CM

## 2022-02-10 DIAGNOSIS — K90.9 MALABSORPTION OF IRON: Primary | ICD-10-CM

## 2022-02-10 PROCEDURE — 96365 THER/PROPH/DIAG IV INF INIT: CPT

## 2022-02-10 PROCEDURE — 6360000002 HC RX W HCPCS: Performed by: INTERNAL MEDICINE

## 2022-02-10 PROCEDURE — 6360000002 HC RX W HCPCS: Performed by: NURSE PRACTITIONER

## 2022-02-10 PROCEDURE — 96375 TX/PRO/DX INJ NEW DRUG ADDON: CPT

## 2022-02-10 PROCEDURE — 2580000003 HC RX 258: Performed by: INTERNAL MEDICINE

## 2022-02-10 PROCEDURE — 96366 THER/PROPH/DIAG IV INF ADDON: CPT

## 2022-02-10 RX ORDER — ONDANSETRON 2 MG/ML
4 INJECTION INTRAMUSCULAR; INTRAVENOUS ONCE
Status: COMPLETED | OUTPATIENT
Start: 2022-02-10 | End: 2022-02-10

## 2022-02-10 RX ORDER — SODIUM CHLORIDE 9 MG/ML
INJECTION, SOLUTION INTRAVENOUS ONCE
Status: COMPLETED | OUTPATIENT
Start: 2022-02-10 | End: 2022-02-10

## 2022-02-10 RX ADMIN — SODIUM CHLORIDE: 9 INJECTION, SOLUTION INTRAVENOUS at 09:22

## 2022-02-10 RX ADMIN — ONDANSETRON 4 MG: 2 INJECTION INTRAMUSCULAR; INTRAVENOUS at 09:22

## 2022-02-10 RX ADMIN — IRON SUCROSE 300 MG: 20 INJECTION, SOLUTION INTRAVENOUS at 09:45

## 2022-02-10 NOTE — PLAN OF CARE
Problem: Musculor/Skeletal Functional Status  Goal: Absence of falls  Outcome: Met This Shift  Note: No falls this admission   Intervention: Fall precautions  Note: Patient aware of fall precautions for here and at home -call light in reach while here       Problem: Intellectual/Education/Knowledge Deficit  Goal: Teaching initiated upon admission  Outcome: Met This Shift  Note: Reviewed venofer with patient, patient offered no questions or concerns. Patient verbalized understanding of drug being administered. Goal: Written Disposition Instruction form completed  Outcome: Met This Shift  Note: Discharge instructions given and reviewed with patient. All questions answered. Patient verbalized understanding   Intervention: Verbal/written education provided  Note: Discharge instruction sheets     Problem: Discharge Planning  Goal: Knowledge of discharge instructions  Description: Knowledge of discharge instructions  Outcome: Met This Shift  Note: Patient  able to teach back follow up appointments and when to call the doctor. Patient offers no questions at this time   Intervention: Interaction with patient/family and care team  Note: All questions and concerns addressed   Intervention: Discharge to appropriate level of care  Note: Discharge home   Care plan reviewed with patient and she verbalized understanding of the plan of care and contributed to goal setting.

## 2022-02-17 RX ORDER — DIPHENHYDRAMINE HYDROCHLORIDE 50 MG/ML
50 INJECTION INTRAMUSCULAR; INTRAVENOUS ONCE
Status: CANCELLED | OUTPATIENT
Start: 2022-02-25 | End: 2022-02-25

## 2022-02-17 RX ORDER — 0.9 % SODIUM CHLORIDE 0.9 %
10 VIAL (ML) INJECTION ONCE
Status: CANCELLED | OUTPATIENT
Start: 2022-02-18 | End: 2022-02-18

## 2022-02-17 RX ORDER — HEPARIN SODIUM (PORCINE) LOCK FLUSH IV SOLN 100 UNIT/ML 100 UNIT/ML
500 SOLUTION INTRAVENOUS PRN
Status: CANCELLED | OUTPATIENT
Start: 2022-02-25

## 2022-02-17 RX ORDER — SODIUM CHLORIDE 0.9 % (FLUSH) 0.9 %
5 SYRINGE (ML) INJECTION PRN
Status: CANCELLED | OUTPATIENT
Start: 2022-02-18

## 2022-02-17 RX ORDER — SODIUM CHLORIDE 9 MG/ML
INJECTION, SOLUTION INTRAVENOUS ONCE
Status: CANCELLED | OUTPATIENT
Start: 2022-02-25 | End: 2022-02-25

## 2022-02-17 RX ORDER — METHYLPREDNISOLONE SODIUM SUCCINATE 125 MG/2ML
125 INJECTION, POWDER, LYOPHILIZED, FOR SOLUTION INTRAMUSCULAR; INTRAVENOUS ONCE
Status: CANCELLED | OUTPATIENT
Start: 2022-02-25 | End: 2022-02-25

## 2022-02-17 RX ORDER — SODIUM CHLORIDE 0.9 % (FLUSH) 0.9 %
10 SYRINGE (ML) INJECTION PRN
Status: CANCELLED | OUTPATIENT
Start: 2022-02-18

## 2022-02-17 RX ORDER — SODIUM CHLORIDE 9 MG/ML
INJECTION, SOLUTION INTRAVENOUS CONTINUOUS
Status: CANCELLED | OUTPATIENT
Start: 2022-02-25

## 2022-02-17 RX ORDER — DIPHENHYDRAMINE HYDROCHLORIDE 50 MG/ML
50 INJECTION INTRAMUSCULAR; INTRAVENOUS ONCE
Status: CANCELLED | OUTPATIENT
Start: 2022-02-18 | End: 2022-02-18

## 2022-02-17 RX ORDER — SODIUM CHLORIDE 9 MG/ML
INJECTION, SOLUTION INTRAVENOUS CONTINUOUS
Status: CANCELLED | OUTPATIENT
Start: 2022-02-18

## 2022-02-17 RX ORDER — HEPARIN SODIUM (PORCINE) LOCK FLUSH IV SOLN 100 UNIT/ML 100 UNIT/ML
500 SOLUTION INTRAVENOUS PRN
Status: CANCELLED | OUTPATIENT
Start: 2022-02-18

## 2022-02-17 RX ORDER — SODIUM CHLORIDE 0.9 % (FLUSH) 0.9 %
5 SYRINGE (ML) INJECTION PRN
Status: CANCELLED | OUTPATIENT
Start: 2022-02-25

## 2022-02-17 RX ORDER — SODIUM CHLORIDE 0.9 % (FLUSH) 0.9 %
10 SYRINGE (ML) INJECTION PRN
Status: CANCELLED | OUTPATIENT
Start: 2022-02-25

## 2022-02-17 RX ORDER — 0.9 % SODIUM CHLORIDE 0.9 %
10 VIAL (ML) INJECTION ONCE
Status: CANCELLED | OUTPATIENT
Start: 2022-02-25 | End: 2022-02-25

## 2022-02-17 RX ORDER — METHYLPREDNISOLONE SODIUM SUCCINATE 125 MG/2ML
125 INJECTION, POWDER, LYOPHILIZED, FOR SOLUTION INTRAMUSCULAR; INTRAVENOUS ONCE
Status: CANCELLED | OUTPATIENT
Start: 2022-02-18 | End: 2022-02-18

## 2022-02-18 ENCOUNTER — HOSPITAL ENCOUNTER (OUTPATIENT)
Dept: INFUSION THERAPY | Age: 52
Discharge: HOME | End: 2022-02-18
Payer: COMMERCIAL

## 2022-02-18 VITALS
OXYGEN SATURATION: 99 % | SYSTOLIC BLOOD PRESSURE: 131 MMHG | HEART RATE: 70 BPM | HEIGHT: 70 IN | WEIGHT: 262.5 LBS | RESPIRATION RATE: 16 BRPM | TEMPERATURE: 97.8 F | DIASTOLIC BLOOD PRESSURE: 78 MMHG | BODY MASS INDEX: 37.58 KG/M2

## 2022-02-18 DIAGNOSIS — D50.9 IRON DEFICIENCY ANEMIA, UNSPECIFIED IRON DEFICIENCY ANEMIA TYPE: ICD-10-CM

## 2022-02-18 DIAGNOSIS — K90.9 MALABSORPTION OF IRON: Primary | ICD-10-CM

## 2022-02-18 DIAGNOSIS — E61.1 IRON DEFICIENCY: ICD-10-CM

## 2022-02-18 PROCEDURE — 2580000003 HC RX 258: Performed by: INTERNAL MEDICINE

## 2022-02-18 PROCEDURE — 96375 TX/PRO/DX INJ NEW DRUG ADDON: CPT

## 2022-02-18 PROCEDURE — 96366 THER/PROPH/DIAG IV INF ADDON: CPT

## 2022-02-18 PROCEDURE — 96365 THER/PROPH/DIAG IV INF INIT: CPT

## 2022-02-18 PROCEDURE — 6360000002 HC RX W HCPCS: Performed by: INTERNAL MEDICINE

## 2022-02-18 RX ORDER — SCOLOPAMINE TRANSDERMAL SYSTEM 1 MG/1
PATCH, EXTENDED RELEASE TRANSDERMAL
Qty: 4 PATCH | Refills: 3 | Status: SHIPPED | OUTPATIENT
Start: 2022-02-18

## 2022-02-18 RX ORDER — PALONOSETRON 0.05 MG/ML
0.25 INJECTION, SOLUTION INTRAVENOUS ONCE
Status: COMPLETED | OUTPATIENT
Start: 2022-02-18 | End: 2022-02-18

## 2022-02-18 RX ORDER — SODIUM CHLORIDE 9 MG/ML
INJECTION, SOLUTION INTRAVENOUS ONCE
Status: COMPLETED | OUTPATIENT
Start: 2022-02-18 | End: 2022-02-18

## 2022-02-18 RX ADMIN — IRON SUCROSE 300 MG: 20 INJECTION, SOLUTION INTRAVENOUS at 09:23

## 2022-02-18 RX ADMIN — PALONOSETRON 0.25 MG: 0.05 INJECTION, SOLUTION INTRAVENOUS at 09:20

## 2022-02-18 RX ADMIN — SODIUM CHLORIDE: 9 INJECTION, SOLUTION INTRAVENOUS at 09:15

## 2022-02-18 NOTE — PROGRESS NOTES
Patient assessed for the following post venofer:    Dizziness   No  Lightheadedness  No      Acute nausea/vomiting No  Headache   No  Chest pain/pressure  No  Rash/itching   No  Shortness of breath  No    Patient kept for 20 minutes observation post infusiony. Patient tolerated venofer without any complications. Last vital signs:   /78   Pulse 70   Temp 97.8 °F (36.6 °C) (Oral)   Resp 16   Ht 5' 10\" (1.778 m)   Wt 262 lb 8 oz (119.1 kg)   SpO2 99%   BMI 37.66 kg/m²       Patient instructed if experience any of the above symptoms following today's infusion,he/she is to notify MD immediately or go to the emergency department. Discharge instructions given to patient. Verbalizes understanding. Ambulated off unit per self with belongings.

## 2022-02-18 NOTE — PLAN OF CARE
Problem: Musculor/Skeletal Functional Status  Goal: Absence of falls  Outcome: Met This Shift  Note: No falls occurred with visit today. Intervention: Fall precautions  Note: Discussed the need to use the call light for assistance when getting up to ambulate. Problem: Intellectual/Education/Knowledge Deficit  Goal: Teaching initiated upon admission  Outcome: Met This Shift  Note: Patient verbalizes understanding to verbal information given on venofer,action and possible side effects. Aware to call MD if develop complications. Goal: Written Disposition Instruction form completed  Outcome: Met This Shift  Intervention: Verbal/written education provided  Note: Discussed indications for iron infusions     Problem: Discharge Planning  Goal: Knowledge of discharge instructions  Description: Knowledge of discharge instructions  Outcome: Met This Shift  Note: Verbalize understanding of discharge instructions, follow up appointments, and when to call Physician. Intervention: Interaction with patient/family and care team  Note: Discuss understanding of discharge instructions, follow up appointments and when to call Physician. Care plan reviewed with patient. Patient verbalizes understanding of the plan of care and contributes to goal setting.

## 2022-02-25 ENCOUNTER — TELEPHONE (OUTPATIENT)
Dept: INFUSION THERAPY | Age: 52
End: 2022-02-25

## 2022-02-25 ENCOUNTER — HOSPITAL ENCOUNTER (OUTPATIENT)
Dept: INFUSION THERAPY | Age: 52
Discharge: HOME | End: 2022-02-25
Payer: COMMERCIAL

## 2022-02-25 VITALS
SYSTOLIC BLOOD PRESSURE: 141 MMHG | BODY MASS INDEX: 37.82 KG/M2 | DIASTOLIC BLOOD PRESSURE: 83 MMHG | RESPIRATION RATE: 16 BRPM | TEMPERATURE: 98.2 F | OXYGEN SATURATION: 100 % | HEIGHT: 70 IN | HEART RATE: 81 BPM | WEIGHT: 264.2 LBS

## 2022-02-25 DIAGNOSIS — K90.9 MALABSORPTION OF IRON: Primary | ICD-10-CM

## 2022-02-25 DIAGNOSIS — D50.9 IRON DEFICIENCY ANEMIA, UNSPECIFIED IRON DEFICIENCY ANEMIA TYPE: ICD-10-CM

## 2022-02-25 PROCEDURE — 96375 TX/PRO/DX INJ NEW DRUG ADDON: CPT

## 2022-02-25 PROCEDURE — 2580000003 HC RX 258: Performed by: INTERNAL MEDICINE

## 2022-02-25 PROCEDURE — 96366 THER/PROPH/DIAG IV INF ADDON: CPT

## 2022-02-25 PROCEDURE — 96365 THER/PROPH/DIAG IV INF INIT: CPT

## 2022-02-25 PROCEDURE — 6360000002 HC RX W HCPCS: Performed by: INTERNAL MEDICINE

## 2022-02-25 RX ORDER — PALONOSETRON 0.05 MG/ML
0.25 INJECTION, SOLUTION INTRAVENOUS ONCE
Status: COMPLETED | OUTPATIENT
Start: 2022-02-25 | End: 2022-02-25

## 2022-02-25 RX ORDER — SODIUM CHLORIDE 9 MG/ML
INJECTION, SOLUTION INTRAVENOUS ONCE
Status: COMPLETED | OUTPATIENT
Start: 2022-02-25 | End: 2022-02-25

## 2022-02-25 RX ADMIN — IRON SUCROSE 300 MG: 20 INJECTION, SOLUTION INTRAVENOUS at 09:42

## 2022-02-25 RX ADMIN — SODIUM CHLORIDE: 9 INJECTION, SOLUTION INTRAVENOUS at 09:19

## 2022-02-25 RX ADMIN — PALONOSETRON 0.25 MG: 0.05 INJECTION, SOLUTION INTRAVENOUS at 09:21

## 2022-02-25 NOTE — PROGRESS NOTES
Patient assessed for the following post iron infusion:    Dizziness   No  Lightheadedness  No      Acute nausea/vomiting No  Headache   No  Chest pain/pressure  No  Rash/itching   No  Shortness of breath  No    Patient kept for 20 minutes observation post infusion Venofer. Patient tolerated irontreatment Venofer 3 of 3 without any complications. Last vital signs:   /79   Pulse 86   Temp 97.7 °F (36.5 °C) (Oral)   Resp 16   Ht 5' 10\" (1.778 m)   Wt 264 lb 3.2 oz (119.8 kg)   SpO2 98%   BMI 37.91 kg/m²     Patient instructed if experience any of the above symptoms following today's infusion, she is to notify MD immediately or go to the emergency department. Discharge instructions given to patient. Verbalizes understanding. Ambulated off unit per self/cane, with belongings.

## 2022-02-25 NOTE — PLAN OF CARE
Problem: Musculor/Skeletal Functional Status  Goal: Absence of falls  Outcome: Met This Shift  Note: Patient free from falls while in O.P. Oncology. Intervention: Fall precautions  Note: Patient assessed for fall risk on admission to [facility]. Fall band placed on patient. Discussed the need to use the call light for assistance prior to getting up out of chair/bed. Problem: Intellectual/Education/Knowledge Deficit  Goal: Teaching initiated upon admission  Outcome: Met This Shift  Note: Patient verbalizes understanding to verbal information given on Venofer ,action and possible side effects. Aware to call MD if develop complications. Goal: Written Disposition Instruction form completed  Outcome: Met This Shift  Intervention: Verbal/written education provided  Note: Venofer reviewed, patient verbalizes understanding of medication being administered and potential side effects. Problem: Discharge Planning  Goal: Knowledge of discharge instructions  Description: Knowledge of discharge instructions  Outcome: Met This Shift  Note: Verbalized understanding of discharge instructions, follow-up appointments, and when to call the physician. Intervention: Interaction with patient/family and care team  Note: Discuss understanding of discharge instructions,follow-up appointments, and when to call the physician. Care plan reviewed with patient. Patient verbalize understanding of the plan of care and contribute to goal setting.

## 2022-02-25 NOTE — TELEPHONE ENCOUNTER
Patient completed her 3rd Venofer treatment today. I do not see a follow up. When does patient need to be seen. She states she saw you 2/9 and something about repeating labs in April. Please advise patient on when to return and with you versus CNP. She states she can come here or go to [facility].

## 2022-02-27 NOTE — PROGRESS NOTES
[facility]  CANCER NETWORK OF [facility]  [facility] [address] [facility] [address]  [address] [facility] [address] [facility] [address]  Dept: [phone]  Dept Fax: [phone]  Loc: [phone]    Subjective:      Chief Complaint:   Rohini Chaves is a 46 y.o. female with a history of anemia. The patient has a history of gastric bypass surgery and has previously been diagnosed with iron deficiency. Her iron deficiency is felt to be secondary to poor absorption due to her previous surgery. HPI:    The patient returns today for follow-up regarding her history of iron deficiency anemia. Her iron deficiency secondary to history of malabsorption that is secondary to a gastric bypass surgery. On today's evaluation the patient complains of generalized weakness and fatigue. She does not have evidence of blood loss. The patient's bowel and bladder habits have been normal.  She does receive intermittent therapy with Venofer. Although her hemoglobin is not significantly low at 12.0 today her ferritin level is low representing low iron stores. We discussed proceeding with intravenous iron in the form of Venofer. The patient is agreeable to this plan of care. She remains active and has an ECOG performance status of level 0. PMH, SH, and FH:  I reviewed the PMH, SH and FH as noted on the electronic medical record. There have been no changes as noted in the previous documentation. Review of Systems   Constitutional: Fatigue. HENT: Negative. Eyes: Negative. Respiratory: Negative. Cardiovascular: Negative. Gastrointestinal: Negative. Genitourinary: Negative. Musculoskeletal: Negative. Skin: Negative. Neurological: General weakness. Hematological: Negative. Psychiatric/Behavioral: Negative.       Physical Exam:   Vitals:    02/09/22 1010   BP: 136/77   Pulse: 76   Resp: 20   Temp: 98.8 °F (37.1 °C)   SpO2: 100%   Vitals reviewed and are stable. Constitutional: Well-developed. No acute distress. HENT: Normocephalic and atraumatic. Eyes: Pupils appear equal and reactive. Pulmonary: Effort normal. No respiratory distress. .  Neurological: Alert and oriented to person, place, and time. Judgment and thought content normal.  Psychiatric: Mood and affect appropriate for the clinical situation. Data Analysis: The following studies were reviewed with the patient today:    Hematology 2/9/2022 4/29/2020 3/24/2020   WBC 3.0 (L) 3.6 (L) 4.9   RBC 4.37 4.59 4.53   HGB 12.0 13.0 13.1   HCT 36.6 (L) 38.1 38.7   MCV 84 83 85.4   RDW 15.5 (H) 13.4     295 446   Ferritin 29 147    Iron 47 (L) 101    TIBC 353 327      Assessment:   1. Chronic aanemia. 2.  Iron deficiency. 3.  Gastric bypass surgery. 4.  Therapy with Venofer. 5.  Leukopenia. Plan:   1. Pre-CERT for Venofer therapy. 2. Once patient has been attending schedule medical therapy with x3 at the office in [address] [place] [address]. 3. Monitor hemoglobin/hematocrit and for any signs of blood loss. .  4. Monitor total WBC count and for signs of infection/fever. 5.  Ferritin level. 6.  Monitor for side effects or toxicity related to Venofer therapy. Ericka Beasley M.D. Medical Director: [facility]  Cancer [facility]  [address] [facility], [address] [place] [address], [address] [place] [address], [address] [place] [address] of the [facility] at [facility]      **This report has been created using voice recognition software. It may contain minor errors which are inherent in voice recognition technology. **

## 2022-04-21 ENCOUNTER — NURSE ONLY (OUTPATIENT)
Dept: LAB | Age: 52
End: 2022-04-21

## 2022-04-21 DIAGNOSIS — D50.9 IRON DEFICIENCY ANEMIA, UNSPECIFIED IRON DEFICIENCY ANEMIA TYPE: ICD-10-CM

## 2022-04-21 LAB
BASOPHILS # BLD: 1 %
BASOPHILS ABSOLUTE: 0 THOU/MM3 (ref 0–0.1)
EOSINOPHIL # BLD: 3.3 %
EOSINOPHILS ABSOLUTE: 0.1 THOU/MM3 (ref 0–0.4)
ERYTHROCYTE [DISTWIDTH] IN BLOOD BY AUTOMATED COUNT: 13.9 % (ref 11.5–14.5)
ERYTHROCYTE [DISTWIDTH] IN BLOOD BY AUTOMATED COUNT: 44.7 FL (ref 35–45)
FERRITIN: 208 NG/ML (ref 10–291)
HCT VFR BLD CALC: 43.3 % (ref 37–47)
HEMOGLOBIN: 14.5 GM/DL (ref 12–16)
IMMATURE GRANS (ABS): 0.04 THOU/MM3 (ref 0–0.07)
IMMATURE GRANULOCYTES: 1 %
IRON: 113 UG/DL (ref 50–170)
LYMPHOCYTES # BLD: 29.2 %
LYMPHOCYTES ABSOLUTE: 1.2 THOU/MM3 (ref 1–4.8)
MCH RBC QN AUTO: 29.3 PG (ref 26–33)
MCHC RBC AUTO-ENTMCNC: 33.5 GM/DL (ref 32.2–35.5)
MCV RBC AUTO: 87.5 FL (ref 81–99)
MONOCYTES # BLD: 9.1 %
MONOCYTES ABSOLUTE: 0.4 THOU/MM3 (ref 0.4–1.3)
NUCLEATED RED BLOOD CELLS: 0 /100 WBC
PLATELET # BLD: 333 THOU/MM3 (ref 130–400)
PMV BLD AUTO: 9.4 FL (ref 9.4–12.4)
RBC # BLD: 4.95 MILL/MM3 (ref 4.2–5.4)
SEG NEUTROPHILS: 56.4 %
SEGMENTED NEUTROPHILS ABSOLUTE COUNT: 2.4 THOU/MM3 (ref 1.8–7.7)
TOTAL IRON BINDING CAPACITY: 357 UG/DL (ref 171–450)
WBC # BLD: 4.2 THOU/MM3 (ref 4.8–10.8)

## 2022-04-25 ENCOUNTER — NURSE ONLY (OUTPATIENT)
Dept: LAB | Age: 52
End: 2022-04-25

## 2022-04-26 ENCOUNTER — OFFICE VISIT (OUTPATIENT)
Dept: ONCOLOGY | Age: 52
End: 2022-04-26
Payer: COMMERCIAL

## 2022-04-26 ENCOUNTER — HOSPITAL ENCOUNTER (OUTPATIENT)
Dept: INFUSION THERAPY | Age: 52
Discharge: HOME | End: 2022-04-26
Payer: COMMERCIAL

## 2022-04-26 VITALS
RESPIRATION RATE: 16 BRPM | DIASTOLIC BLOOD PRESSURE: 71 MMHG | HEART RATE: 71 BPM | SYSTOLIC BLOOD PRESSURE: 127 MMHG | TEMPERATURE: 98.2 F | OXYGEN SATURATION: 98 %

## 2022-04-26 VITALS
HEIGHT: 70 IN | BODY MASS INDEX: 37.48 KG/M2 | SYSTOLIC BLOOD PRESSURE: 129 MMHG | DIASTOLIC BLOOD PRESSURE: 81 MMHG | RESPIRATION RATE: 16 BRPM | TEMPERATURE: 98.2 F | HEART RATE: 69 BPM | WEIGHT: 261.8 LBS | OXYGEN SATURATION: 100 %

## 2022-04-26 DIAGNOSIS — E61.1 IRON DEFICIENCY: Primary | ICD-10-CM

## 2022-04-26 DIAGNOSIS — D72.819 LEUKOPENIA, UNSPECIFIED TYPE: ICD-10-CM

## 2022-04-26 DIAGNOSIS — D50.9 IRON DEFICIENCY ANEMIA, UNSPECIFIED IRON DEFICIENCY ANEMIA TYPE: ICD-10-CM

## 2022-04-26 DIAGNOSIS — K90.9 MALABSORPTION OF IRON: ICD-10-CM

## 2022-04-26 LAB
ESTRADIOL LEVEL: < 5 PG/ML (ref 27–314)
FOLLICLE STIMULATING HORMONE: 44.1 MIU/ML (ref 1.7–21.5)

## 2022-04-26 PROCEDURE — 99211 OFF/OP EST MAY X REQ PHY/QHP: CPT

## 2022-04-26 PROCEDURE — 99213 OFFICE O/P EST LOW 20 MIN: CPT | Performed by: INTERNAL MEDICINE

## 2022-04-28 ENCOUNTER — HOSPITAL ENCOUNTER (OUTPATIENT)
Dept: WOMENS IMAGING | Age: 52
Discharge: HOME | End: 2022-04-28

## 2022-04-28 ENCOUNTER — HOSPITAL ENCOUNTER (OUTPATIENT)
Dept: WOMENS IMAGING | Age: 52
Discharge: HOME | End: 2022-04-28
Payer: COMMERCIAL

## 2022-04-28 DIAGNOSIS — Z00.6 ENCOUNTER FOR EXAMINATION FOR NORMAL COMPARISON OR CONTROL IN CLINICAL RESEARCH PROGRAM: ICD-10-CM

## 2022-04-28 DIAGNOSIS — Z12.31 VISIT FOR SCREENING MAMMOGRAM: ICD-10-CM

## 2022-04-28 PROCEDURE — 77063 BREAST TOMOSYNTHESIS BI: CPT

## 2022-05-15 NOTE — PROGRESS NOTES
[facility]  CANCER NETWORK OF [facility]  [facility] [address] [facility] 98  [address] [facility] [address]  Dept: [phone]  Dept Fax: [phone]  Loc: [phone]    Subjective:      Chief Complaint:   Patricia Cain is a 46 y.o. female with a history of anemia. The patient has a history of gastric bypass surgery and has previously been diagnosed with iron deficiency. Her iron deficiency is felt to be secondary to poor absorption due to her previous surgery. HPI:    Oscar Somers is here today for follow-up regarding her history of anemia. Her anemia is secondary to iron deficiency which is a result of malabsorption from a previous gastric bypass surgery. The patient has required intermittent therapy with intravenous Venofer. Her most recent course of therapy was in February 2022. She has had some mild allergic reaction related to therapy with Venofer. Recent laboratory studies have found an improvement in her hemoglobin and hematocrit. She does have mild leukopenia. The patient feels well and does not have any specific hematological complaints today. The patient denies shortness of breath, chest pain, a change in bowel habits or a change in bladder habits. ECOG performance status is level 0. PMH, SH, and FH:  I reviewed the PMH, SH and FH as noted on the electronic medical record. There have been no changes as noted in the previous documentation. Review of Systems   Constitutional: Negative. HENT: Negative. Eyes: Negative. Respiratory: Negative. Cardiovascular: Negative. Gastrointestinal: Negative. Genitourinary: Negative. Musculoskeletal: Negative. Skin: Negative. Neurological: Negative. Hematological: Negative. Psychiatric/Behavioral: Negative.       Physical Exam:   Vitals:    04/26/22 1030   BP: 129/81   Pulse: 69   Resp: 16   Temp: 98.2 °F (36.8 °C)   SpO2: 100% Vitals reviewed and are stable. Constitutional: Well-developed. No acute distress. HENT: Normocephalic and atraumatic. Eyes: Pupils appear equal and reactive. Neck: Overall appearance is symmetrical. No identifiable masses. Pulmonary: Effort normal. No respiratory distress. .  Neurological: Alert and oriented to person, place, and time. Judgment and thought content normal.  Skin: Skin is warm and dry. No rash. Psychiatric: Mood and affect appropriate for the clinical situation. Data Analysis: The following studies were reviewed with the patient today:    Hematology 4/21/2022 2/9/2022 4/29/2020   WBC 4.2 (L) 3.0 (L) 3.6 (L)   RBC 4.95 4.37 4.59   HGB 14.5 12.0 13.0   HCT 43.3 36.6 (L) 38.1   MCV 87.5 84 83   RDW  15.5 (H) 13.4    291 295   Ferritin 208 29 147   Iron 113 47 (L) 101   TIBC 357 353 327     Assessment:   1. History of anemia. 2.  Iron deficiency. 3.  Gastric bypass surgery. 4.  Leukopenia. Plan:   1. Monitor hemoglobin/hematocrit and for any signs of blood loss. .  2.  Monitor total WBC count and for signs of infection/fever. 3.  Monitor ferritin level. Fermin Mojica M.D. Medical Director: [facility]  Cancer [facility]  [address] [facility] [place] [address] [place] [address], [address] [place] [address], [address] [place] [address] of the [facility] at [facility]      **This report has been created using voice recognition software. It may contain minor errors which are inherent in voice recognition technology. **

## 2022-06-28 ENCOUNTER — NURSE ONLY (OUTPATIENT)
Dept: LAB | Age: 52
End: 2022-06-28

## 2022-06-28 DIAGNOSIS — E61.1 IRON DEFICIENCY: ICD-10-CM

## 2022-06-28 DIAGNOSIS — D50.9 IRON DEFICIENCY ANEMIA, UNSPECIFIED IRON DEFICIENCY ANEMIA TYPE: ICD-10-CM

## 2022-06-28 LAB
BASOPHILS # BLD: 0.9 %
BASOPHILS ABSOLUTE: 0 THOU/MM3 (ref 0–0.1)
EOSINOPHIL # BLD: 2.6 %
EOSINOPHILS ABSOLUTE: 0.1 THOU/MM3 (ref 0–0.4)
ERYTHROCYTE [DISTWIDTH] IN BLOOD BY AUTOMATED COUNT: 13.2 % (ref 11.5–14.5)
ERYTHROCYTE [DISTWIDTH] IN BLOOD BY AUTOMATED COUNT: 43.1 FL (ref 35–45)
FERRITIN: 71 NG/ML (ref 10–291)
HCT VFR BLD CALC: 42.2 % (ref 37–47)
HEMOGLOBIN: 13.6 GM/DL (ref 12–16)
IMMATURE GRANS (ABS): 0.01 THOU/MM3 (ref 0–0.07)
IMMATURE GRANULOCYTES: 0.2 %
IRON: 62 UG/DL (ref 50–170)
LYMPHOCYTES # BLD: 23.6 %
LYMPHOCYTES ABSOLUTE: 1 THOU/MM3 (ref 1–4.8)
MCH RBC QN AUTO: 29.2 PG (ref 26–33)
MCHC RBC AUTO-ENTMCNC: 32.2 GM/DL (ref 32.2–35.5)
MCV RBC AUTO: 90.6 FL (ref 81–99)
MONOCYTES # BLD: 13.9 %
MONOCYTES ABSOLUTE: 0.6 THOU/MM3 (ref 0.4–1.3)
NUCLEATED RED BLOOD CELLS: 0 /100 WBC
PLATELET # BLD: 301 THOU/MM3 (ref 130–400)
PMV BLD AUTO: 10.7 FL (ref 9.4–12.4)
RBC # BLD: 4.66 MILL/MM3 (ref 4.2–5.4)
SEG NEUTROPHILS: 58.8 %
SEGMENTED NEUTROPHILS ABSOLUTE COUNT: 2.5 THOU/MM3 (ref 1.8–7.7)
TOTAL IRON BINDING CAPACITY: 357 UG/DL (ref 171–450)
WBC # BLD: 4.2 THOU/MM3 (ref 4.8–10.8)

## 2022-06-29 ENCOUNTER — HOSPITAL ENCOUNTER (OUTPATIENT)
Dept: INFUSION THERAPY | Age: 52
Discharge: HOME | End: 2022-06-29
Payer: COMMERCIAL

## 2022-06-29 ENCOUNTER — OFFICE VISIT (OUTPATIENT)
Dept: ONCOLOGY | Age: 52
End: 2022-06-29
Payer: COMMERCIAL

## 2022-06-29 VITALS
RESPIRATION RATE: 16 BRPM | DIASTOLIC BLOOD PRESSURE: 84 MMHG | HEART RATE: 86 BPM | OXYGEN SATURATION: 100 % | HEIGHT: 70 IN | WEIGHT: 264 LBS | TEMPERATURE: 99 F | SYSTOLIC BLOOD PRESSURE: 126 MMHG | BODY MASS INDEX: 37.8 KG/M2

## 2022-06-29 DIAGNOSIS — K90.9 MALABSORPTION OF IRON: ICD-10-CM

## 2022-06-29 DIAGNOSIS — E61.1 IRON DEFICIENCY: Primary | ICD-10-CM

## 2022-06-29 DIAGNOSIS — D72.819 LEUKOPENIA, UNSPECIFIED TYPE: ICD-10-CM

## 2022-06-29 DIAGNOSIS — D50.9 IRON DEFICIENCY ANEMIA, UNSPECIFIED IRON DEFICIENCY ANEMIA TYPE: ICD-10-CM

## 2022-06-29 PROCEDURE — 99213 OFFICE O/P EST LOW 20 MIN: CPT | Performed by: INTERNAL MEDICINE

## 2022-06-29 PROCEDURE — 99211 OFF/OP EST MAY X REQ PHY/QHP: CPT

## 2022-06-30 NOTE — PROGRESS NOTES
[facility]  CANCER NETWORK OF [facility]  [facility] [address] [facility] 98  [address]  [address]  Dept: [phone]  Dept Fax: [phone]  Loc: [phone]    Subjective:      Chief Complaint:   Patricia Cain is a 46 y.o. female with a history of anemia. The patient has a history of gastric bypass surgery and has previously been diagnosed with iron deficiency. Her iron deficiency is felt to be secondary to poor absorption due to her previous surgery. HPI:    The patient is here today for follow-up regarding her history of anemia. She also has a history of mild leukopenia. She had laboratory work obtained earlier this week that found a slight decrease in her hemoglobin, hematocrit, and ferritin level. The patient does have some mild generalized weakness and fatigue however after reviewing laboratory studies she feels that she does not feel bad enough to consider intravenous iron therapy at this time. Therefore we will continue a pattern of surveillance of both her hemoglobin and her total white blood cell count. She has not had fever, cough, shortness of breath or other signs of infection. The patient's bowel and bladder habits have been normal.  She has not seen blood in her stool or urine. The patient remains active with an ECOG performance status of level 0. PMH, SH, and FH:  I reviewed the PMH, SH and FH as noted on the electronic medical record. There have been no changes as noted in the previous documentation. Review of Systems   Constitutional: Fatigue. HENT: Negative. Eyes: Negative. Respiratory: Negative. Cardiovascular: Negative. Gastrointestinal: Negative. Genitourinary: Negative. Musculoskeletal: Negative. Skin: Negative. Neurological: General weakness. Hematological: Negative. Psychiatric/Behavioral: Negative.      Physical Exam:   Vitals:    06/29/22 1114   BP: 126/84   Pulse: 86   Resp: 16   Temp: 99 °F (37.2 °C)   SpO2: 100%   Vitals reviewed and are stable. Constitutional: Well-developed. No acute distress. HENT: Normocephalic and atraumatic. Eyes: Pupils appear equal and reactive. Neck: Overall appearance is symmetrical. No identifiable masses. Pulmonary: Effort normal. No respiratory distress. .  Neurological: Alert and oriented to person, place, and time. Judgment and thought content normal.  Skin: Skin is warm and dry. No rash. Psychiatric: Mood and affect appropriate for the clinical situation. Data Analysis: The following studies were reviewed with the patient today:    Hematology 6/28/2022 4/21/2022 2/9/2022   WBC 4.2 (L) 4.2 (L) 3.0 (L)   RBC 4.66 4.95 4.37   HGB 13.6 14.5 12.0   HCT 42.2 43.3 36.6 (L)   MCV 90.6 87.5 84   RDW   15.5 (H)    333 291   Ferritin 71 208 29   Iron 62 113 47 (L)   TIBC 357 357 353     Assessment:   1. History of anemia. 2.  Iron deficiency. 3.  Gastric bypass surgery. 4.  Leukopenia. Plan:   1. Monitor hemoglobin/hematocrit and for any signs of blood loss. .  2.  Monitor total WBC count and for signs of infection/fever. 3.  Monitor ferritin level. Jonatan Saucedo M.D. Medical Director: [address] [facility], [address] [place] [address], [address] [place] [address] of [facility] at [facility]      **This report has been created using voice recognition software. It may contain minor errors which are inherent in voice recognition technology. **

## 2022-08-29 ENCOUNTER — NURSE ONLY (OUTPATIENT)
Dept: LAB | Age: 52
End: 2022-08-29

## 2022-09-01 ENCOUNTER — TELEPHONE (OUTPATIENT)
Dept: ONCOLOGY | Age: 52
End: 2022-09-01

## 2022-09-01 DIAGNOSIS — E61.1 IRON DEFICIENCY: Primary | ICD-10-CM

## 2022-09-01 NOTE — TELEPHONE ENCOUNTER
Patient calling in and asking if Dr. Shan Snyder can put in her next blood work so she can have it completed before she sees you at her next appointment next week. Please advise.

## 2022-09-06 ENCOUNTER — HOSPITAL ENCOUNTER (OUTPATIENT)
Dept: INFUSION THERAPY | Age: 52
Discharge: HOME | End: 2022-09-06
Payer: COMMERCIAL

## 2022-09-06 ENCOUNTER — OFFICE VISIT (OUTPATIENT)
Dept: ONCOLOGY | Age: 52
End: 2022-09-06
Payer: COMMERCIAL

## 2022-09-06 VITALS
SYSTOLIC BLOOD PRESSURE: 131 MMHG | TEMPERATURE: 99.1 F | RESPIRATION RATE: 18 BRPM | WEIGHT: 259.2 LBS | OXYGEN SATURATION: 99 % | BODY MASS INDEX: 37.11 KG/M2 | DIASTOLIC BLOOD PRESSURE: 70 MMHG | HEIGHT: 70 IN | HEART RATE: 67 BPM

## 2022-09-06 VITALS
HEIGHT: 70 IN | BODY MASS INDEX: 37.11 KG/M2 | DIASTOLIC BLOOD PRESSURE: 70 MMHG | OXYGEN SATURATION: 99 % | WEIGHT: 259.2 LBS | RESPIRATION RATE: 18 BRPM | SYSTOLIC BLOOD PRESSURE: 131 MMHG | TEMPERATURE: 99.1 F | HEART RATE: 67 BPM

## 2022-09-06 DIAGNOSIS — E61.1 IRON DEFICIENCY: ICD-10-CM

## 2022-09-06 DIAGNOSIS — D72.819 LEUKOPENIA, UNSPECIFIED TYPE: ICD-10-CM

## 2022-09-06 DIAGNOSIS — Z79.899 ENCOUNTER FOR MEDICATION MANAGEMENT: ICD-10-CM

## 2022-09-06 DIAGNOSIS — K90.9 MALABSORPTION OF IRON: ICD-10-CM

## 2022-09-06 DIAGNOSIS — Z98.84 HISTORY OF GASTRIC BYPASS: ICD-10-CM

## 2022-09-06 DIAGNOSIS — E61.1 IRON DEFICIENCY: Primary | ICD-10-CM

## 2022-09-06 DIAGNOSIS — D50.9 IRON DEFICIENCY ANEMIA, UNSPECIFIED IRON DEFICIENCY ANEMIA TYPE: ICD-10-CM

## 2022-09-06 LAB
ABSOLUTE IMMATURE GRANULOCYTE: 0.01 THOU/MM3 (ref 0–0.07)
BASINOPHIL, AUTOMATED: 1 % (ref 0–3)
BASOPHILS ABSOLUTE: 0 THOU/MM3 (ref 0–0.1)
EOSINOPHILS ABSOLUTE: 0.1 THOU/MM3 (ref 0–0.4)
EOSINOPHILS RELATIVE PERCENT: 3 % (ref 0–4)
HCT VFR BLD CALC: 40 % (ref 37–47)
HEMOGLOBIN: 13.5 GM/DL (ref 12–16)
IMMATURE GRANULOCYTES: 0 %
LYMPHOCYTES # BLD: 19 % (ref 15–47)
LYMPHOCYTES ABSOLUTE: 0.9 THOU/MM3 (ref 1–4.8)
MCH RBC QN AUTO: 28.9 PG (ref 26–33)
MCHC RBC AUTO-ENTMCNC: 33.8 GM/DL (ref 32.2–35.5)
MCV RBC AUTO: 86 FL (ref 81–99)
MONOCYTES ABSOLUTE: 0.5 THOU/MM3 (ref 0.4–1.3)
MONOCYTES: 11 % (ref 0–12)
PDW BLD-RTO: 12.8 % (ref 11.5–14.5)
PLATELET # BLD: 286 THOU/MM3 (ref 130–400)
PMV BLD AUTO: 9.5 FL (ref 9.4–12.4)
RBC # BLD: 4.67 MILL/MM3 (ref 4.2–5.4)
SEG NEUTROPHILS: 66 % (ref 43–75)
SEGMENTED NEUTROPHILS ABSOLUTE COUNT: 3.1 THOU/MM3 (ref 1.8–7.7)
WBC # BLD: 4.7 THOU/MM3 (ref 4.8–10.8)

## 2022-09-06 PROCEDURE — 99211 OFF/OP EST MAY X REQ PHY/QHP: CPT

## 2022-09-06 PROCEDURE — 85025 COMPLETE CBC W/AUTO DIFF WBC: CPT

## 2022-09-06 PROCEDURE — 82728 ASSAY OF FERRITIN: CPT

## 2022-09-06 PROCEDURE — 99215 OFFICE O/P EST HI 40 MIN: CPT | Performed by: INTERNAL MEDICINE

## 2022-09-06 NOTE — PATIENT INSTRUCTIONS
1. Pre-CERT Venofer therapy. 2. Schedule therapy with Venofer x3 at the office in [facility].

## 2022-09-07 LAB — FERRITIN: 58 NG/ML (ref 10–291)

## 2022-09-09 NOTE — PROGRESS NOTES
[facility] CENTER  [facility]ER NETWORK OF [facility]  [facility]  [facility] [address] [facility]  [address]  [address]  Dept: [phone]  Dept Fax: [phone]  Loc: [phone]    Subjective:      Chief Complaint:   Adrien Maravilla is a 46 y.o. female with a history of anemia. The patient has a history of gastric bypass surgery and has previously been diagnosed with iron deficiency. Her iron deficiency is felt to be secondary to poor absorption due to her previous surgery. HPI:    Brenda Cook returns today for follow-up regarding a history of anemia. The patient also has had a history of chronic anemia. On today's evaluation she states that he is having increased fatigue and generalized weakness that is reminiscent of her periods of time when she has anemia and iron deficiency. On her laboratory studies she does have a mild decrease in her hemoglobin, Guzmán crit, and ferritin level although they remain within a normal range. Given her symptoms and history of gastric bypass surgery we are going to proceed with therapy with Venofer. The patient has tolerated this in the past and has had good clinical response to her symptoms related Venofer therapy. She also continues to have mild leukopenia. The patient does not have specific symptoms related to her low white blood cell count. She has not had fever, cough, shortness of breath or other signs of infection. The patient's bowel and bladder habits have been normal.  She has not seen blood in her stool or urine. The patient remains active with an ECOG performance status of level 0. PMH, SH, and FH:  I reviewed the PMH, SH and FH as noted on the electronic medical record. There have been no changes as noted in the previous documentation. Review of Systems   Constitutional: Fatigue. HENT: Negative. Eyes: Negative. Respiratory: Negative. Cardiovascular: Negative. Gastrointestinal: Negative. Genitourinary: Negative. Musculoskeletal: Negative. Skin: Negative. Neurological: General weakness. Hematological: Negative. Psychiatric/Behavioral: Negative. Physical Exam:   Vitals:    09/06/22 1149   BP: 131/70   Pulse: 67   Resp: 18   Temp: 99.1 °F (37.3 °C)   SpO2: 99%   Vitals reviewed and are stable. Constitutional: Well-developed. No acute distress. HENT: Normocephalic and atraumatic. Eyes: Pupils appear equal and reactive. Neck: Overall appearance is symmetrical. No identifiable masses. Pulmonary: Effort normal. No respiratory distress. .  Neurological: Alert and oriented to person, place, and time. Judgment and thought content normal.  Skin: Skin is warm and dry. No rash. Psychiatric: Mood and affect appropriate for the clinical situation. Data Analysis: The following studies were reviewed with the patient today:    Hematology 9/6/2022 6/28/2022 4/21/2022   WBC 4.7 (L) 4.2 (L) 4.2 (L)   RBC 4.67 4.66 4.95   HGB 13.5 13.6 14.5   HCT 40.0 42.2 43.3   MCV 86 90.6 87.5   RDW 12.8      301 333   Ferritin 58 71 208   Iron  62 113   TIBC  357 357     Assessment:   1. History of anemia. 2.  Iron deficiency. 3.  Gastric bypass surgery. 4.  Chronic leukopenia. 5.  Therapy with Venofer. Plan:   1. Pre-CERT Venofer therapy. 2.  Schedule therapy with Venofer x3 at the office in [facility]. .    3.  Monitor hemoglobin/hematocrit and for any signs of blood loss. .  4.  Monitor total WBC count and for signs of infection/fever. 5.  Monitor ferritin level. 6.  Monitor for toxicity of Venofer. Nancy De Leon M.D.                                                                          Medical Director: [facility]  Cancer [place] [address] [address] [facility], [address] [place] [address] - Forestine Meigs, [address] [facility], [address] [facility] [address] of the [facility] at the [facility]      **This report has been created using voice recognition software. It may contain minor errors which are inherent in voice recognition technology. **

## 2022-09-20 RX ORDER — SODIUM CHLORIDE 9 MG/ML
INJECTION, SOLUTION INTRAVENOUS CONTINUOUS
Status: CANCELLED | OUTPATIENT
Start: 2022-09-23

## 2022-09-20 RX ORDER — SODIUM CHLORIDE 9 MG/ML
INJECTION, SOLUTION INTRAVENOUS ONCE
Status: CANCELLED | OUTPATIENT
Start: 2022-09-23 | End: 2022-09-20

## 2022-09-20 RX ORDER — 0.9 % SODIUM CHLORIDE 0.9 %
10 VIAL (ML) INJECTION ONCE
Status: CANCELLED | OUTPATIENT
Start: 2022-09-23 | End: 2022-09-20

## 2022-09-20 RX ORDER — METHYLPREDNISOLONE SODIUM SUCCINATE 125 MG/2ML
125 INJECTION, POWDER, LYOPHILIZED, FOR SOLUTION INTRAMUSCULAR; INTRAVENOUS ONCE
Status: CANCELLED | OUTPATIENT
Start: 2022-09-23 | End: 2022-09-20

## 2022-09-20 RX ORDER — PALONOSETRON 0.05 MG/ML
0.25 INJECTION, SOLUTION INTRAVENOUS ONCE
Status: CANCELLED | OUTPATIENT
Start: 2022-09-23 | End: 2022-09-20

## 2022-09-20 RX ORDER — EPINEPHRINE 1 MG/ML
0.3 INJECTION, SOLUTION, CONCENTRATE INTRAVENOUS PRN
Status: CANCELLED | OUTPATIENT
Start: 2022-09-23

## 2022-09-20 RX ORDER — SODIUM CHLORIDE 0.9 % (FLUSH) 0.9 %
10 SYRINGE (ML) INJECTION PRN
Status: CANCELLED | OUTPATIENT
Start: 2022-09-23

## 2022-09-20 RX ORDER — FAMOTIDINE 10 MG/ML
20 INJECTION, SOLUTION INTRAVENOUS ONCE
Status: CANCELLED | OUTPATIENT
Start: 2022-09-23 | End: 2022-09-20

## 2022-09-20 RX ORDER — SODIUM CHLORIDE 0.9 % (FLUSH) 0.9 %
5 SYRINGE (ML) INJECTION PRN
Status: CANCELLED | OUTPATIENT
Start: 2022-09-23

## 2022-09-20 RX ORDER — HEPARIN SODIUM (PORCINE) LOCK FLUSH IV SOLN 100 UNIT/ML 100 UNIT/ML
500 SOLUTION INTRAVENOUS PRN
Status: CANCELLED | OUTPATIENT
Start: 2022-09-23

## 2022-09-20 RX ORDER — DIPHENHYDRAMINE HYDROCHLORIDE 50 MG/ML
50 INJECTION INTRAMUSCULAR; INTRAVENOUS ONCE
Status: CANCELLED | OUTPATIENT
Start: 2022-09-23 | End: 2022-09-20

## 2022-09-23 ENCOUNTER — HOSPITAL ENCOUNTER (OUTPATIENT)
Dept: INFUSION THERAPY | Age: 52
Discharge: HOME | End: 2022-09-23
Payer: COMMERCIAL

## 2022-09-23 VITALS
SYSTOLIC BLOOD PRESSURE: 145 MMHG | TEMPERATURE: 97.8 F | RESPIRATION RATE: 18 BRPM | OXYGEN SATURATION: 100 % | HEART RATE: 65 BPM | DIASTOLIC BLOOD PRESSURE: 76 MMHG

## 2022-09-23 DIAGNOSIS — K90.9 MALABSORPTION OF IRON: Primary | ICD-10-CM

## 2022-09-23 DIAGNOSIS — D50.9 IRON DEFICIENCY ANEMIA, UNSPECIFIED IRON DEFICIENCY ANEMIA TYPE: ICD-10-CM

## 2022-09-23 PROCEDURE — 6360000002 HC RX W HCPCS: Performed by: INTERNAL MEDICINE

## 2022-09-23 PROCEDURE — 96365 THER/PROPH/DIAG IV INF INIT: CPT

## 2022-09-23 PROCEDURE — 96366 THER/PROPH/DIAG IV INF ADDON: CPT

## 2022-09-23 PROCEDURE — 2580000003 HC RX 258: Performed by: INTERNAL MEDICINE

## 2022-09-23 PROCEDURE — 96375 TX/PRO/DX INJ NEW DRUG ADDON: CPT

## 2022-09-23 RX ORDER — PALONOSETRON 0.05 MG/ML
0.25 INJECTION, SOLUTION INTRAVENOUS ONCE
Status: COMPLETED | OUTPATIENT
Start: 2022-09-23 | End: 2022-09-23

## 2022-09-23 RX ORDER — SODIUM CHLORIDE 9 MG/ML
INJECTION, SOLUTION INTRAVENOUS ONCE
Status: COMPLETED | OUTPATIENT
Start: 2022-09-23 | End: 2022-09-23

## 2022-09-23 RX ADMIN — IRON SUCROSE 300 MG: 20 INJECTION, SOLUTION INTRAVENOUS at 14:18

## 2022-09-23 RX ADMIN — SODIUM CHLORIDE: 9 INJECTION, SOLUTION INTRAVENOUS at 14:13

## 2022-09-23 RX ADMIN — PALONOSETRON 0.25 MG: 0.05 INJECTION, SOLUTION INTRAVENOUS at 14:16

## 2022-09-23 NOTE — DISCHARGE INSTRUCTIONS
Please contact your Oncologist if you have any questions regarding the VENOFER that you received today. Patient instructed if experience any of the symptoms following today,s  infusion/ to notify MD immediately or go to emergency department.     * dizziness/lightheadedness  *acute nausea/vomiting - not relieved with medication  *headache - not relieved from Tylenol/pain medication  *chest pain/pressure  *rash/itching  *shortness of breath        Drink fluids - 48oz fluids daily  Call if develop fever/ chills/ signs or symptoms of infection

## 2022-09-23 NOTE — PROGRESS NOTES
Patient assessed for the following post venofer:    Dizziness   No  Lightheadedness  No      Acute nausea/vomiting No  Headache   No  Chest pain/pressure  No  Rash/itching   No  Shortness of breath  No    Patient kept for 20 minutes observation post infusion. Patient tolerated venofer without any complications. Last vital signs:   BP (!) 145/76   Pulse 65   Temp 97.8 °F (36.6 °C) (Oral)   Resp 18   SpO2 100%   Patient instructed if experience any of the above symptoms following today's infusion,she is to notify MD immediately or go to the emergency department. Discharge instructions given to patient. Verbalizes understanding. Ambulated off unit per self  with belongings.

## 2022-09-23 NOTE — PLAN OF CARE
Problem: Musculor/Skeletal Functional Status  Goal: Absence of falls  Outcome: Adequate for Discharge  Note: No falls this admission   Intervention: Fall precautions  Note: Patient aware of fall precautions for here and at home -call light in reach while here       Problem: Intellectual/Education/Knowledge Deficit  Goal: Teaching initiated upon admission  Outcome: Adequate for Discharge  Note: Reviewed venofer with patient, patient offered no questions or concerns. Patient verbalized understanding of drug being administered. Goal: Written Disposition Instruction form completed  Outcome: Adequate for Discharge  Note: Discharge instructions given and reviewed with patient. All questions answered. Patient verbalized understanding   Intervention: Verbal/written education provided  Note: Discharge instruction sheets      Problem: Discharge Planning  Goal: Knowledge of discharge instructions  Description: Knowledge of discharge instructions  Outcome: Adequate for Discharge  Note: Patient  able to teach back follow up appointments and when to call the doctor. Patient offers no questions at this time   Intervention: Interaction with patient/family and care team  Note: All questions and concerns addressed   Intervention: Discharge to appropriate level of care  Note: Discharge home   Care plan reviewed with patient and she  verbalized understanding of the plan of care and contributed to goal setting.

## 2022-09-29 RX ORDER — METHYLPREDNISOLONE SODIUM SUCCINATE 125 MG/2ML
125 INJECTION, POWDER, LYOPHILIZED, FOR SOLUTION INTRAMUSCULAR; INTRAVENOUS ONCE
Status: CANCELLED | OUTPATIENT
Start: 2022-09-30 | End: 2022-09-30

## 2022-09-29 RX ORDER — SODIUM CHLORIDE 0.9 % (FLUSH) 0.9 %
5 SYRINGE (ML) INJECTION PRN
Status: CANCELLED | OUTPATIENT
Start: 2022-09-30

## 2022-09-29 RX ORDER — SODIUM CHLORIDE 0.9 % (FLUSH) 0.9 %
10 SYRINGE (ML) INJECTION PRN
Status: CANCELLED | OUTPATIENT
Start: 2022-09-30

## 2022-09-29 RX ORDER — DIPHENHYDRAMINE HYDROCHLORIDE 50 MG/ML
50 INJECTION INTRAMUSCULAR; INTRAVENOUS ONCE
Status: CANCELLED | OUTPATIENT
Start: 2022-09-30 | End: 2022-09-30

## 2022-09-29 RX ORDER — 0.9 % SODIUM CHLORIDE 0.9 %
10 VIAL (ML) INJECTION ONCE
Status: CANCELLED | OUTPATIENT
Start: 2022-09-30 | End: 2022-09-30

## 2022-09-29 RX ORDER — HEPARIN SODIUM (PORCINE) LOCK FLUSH IV SOLN 100 UNIT/ML 100 UNIT/ML
500 SOLUTION INTRAVENOUS PRN
Status: CANCELLED | OUTPATIENT
Start: 2022-09-30

## 2022-09-29 RX ORDER — SODIUM CHLORIDE 9 MG/ML
INJECTION, SOLUTION INTRAVENOUS CONTINUOUS
Status: CANCELLED | OUTPATIENT
Start: 2022-09-30

## 2022-09-30 ENCOUNTER — HOSPITAL ENCOUNTER (OUTPATIENT)
Dept: INFUSION THERAPY | Age: 52
Discharge: HOME | End: 2022-09-30
Payer: COMMERCIAL

## 2022-09-30 VITALS
SYSTOLIC BLOOD PRESSURE: 137 MMHG | RESPIRATION RATE: 18 BRPM | HEART RATE: 70 BPM | OXYGEN SATURATION: 98 % | DIASTOLIC BLOOD PRESSURE: 83 MMHG | TEMPERATURE: 98.3 F

## 2022-09-30 DIAGNOSIS — K90.9 MALABSORPTION OF IRON: Primary | ICD-10-CM

## 2022-09-30 DIAGNOSIS — D50.9 IRON DEFICIENCY ANEMIA, UNSPECIFIED IRON DEFICIENCY ANEMIA TYPE: ICD-10-CM

## 2022-09-30 PROCEDURE — 96365 THER/PROPH/DIAG IV INF INIT: CPT

## 2022-09-30 PROCEDURE — 96366 THER/PROPH/DIAG IV INF ADDON: CPT

## 2022-09-30 PROCEDURE — 6360000002 HC RX W HCPCS: Performed by: INTERNAL MEDICINE

## 2022-09-30 PROCEDURE — 2580000003 HC RX 258: Performed by: INTERNAL MEDICINE

## 2022-09-30 PROCEDURE — 96375 TX/PRO/DX INJ NEW DRUG ADDON: CPT

## 2022-09-30 RX ORDER — PALONOSETRON 0.05 MG/ML
0.25 INJECTION, SOLUTION INTRAVENOUS ONCE
Status: COMPLETED | OUTPATIENT
Start: 2022-09-30 | End: 2022-09-30

## 2022-09-30 RX ORDER — SODIUM CHLORIDE 9 MG/ML
INJECTION, SOLUTION INTRAVENOUS ONCE
Status: COMPLETED | OUTPATIENT
Start: 2022-09-30 | End: 2022-09-30

## 2022-09-30 RX ADMIN — PALONOSETRON 0.25 MG: 0.25 INJECTION, SOLUTION INTRAVENOUS at 14:20

## 2022-09-30 RX ADMIN — SODIUM CHLORIDE: 9 INJECTION, SOLUTION INTRAVENOUS at 14:20

## 2022-09-30 RX ADMIN — IRON SUCROSE 300 MG: 20 INJECTION, SOLUTION INTRAVENOUS at 14:26

## 2022-09-30 NOTE — DISCHARGE INSTRUCTIONS
Call if any uncontrolled nausea or vomiting   Call if any fever,chills, problems or concerns  Call if any questions  Drink at least 6 - 8 ounces glasses of fluids a day    Patient to watch for any:    Dizziness     Lightheadedness        Acute nausea/vomiting   Headache     Chest pain/pressure    Rash/itching     Shortness of breath      Patient instructed if experience any of the above symptoms following today's venofer infusion, she is to notify MD immediately or go to the emergency department.

## 2022-09-30 NOTE — PLAN OF CARE
Problem: Musculor/Skeletal Functional Status  Goal: Absence of falls  Outcome: Adequate for Discharge  Note: Patient free from falls while in O.P. Oncology. Intervention: Fall precautions  Note: Patient assessed for [facility]. Fall band placed on patient. Discussed the need to use the call light for assistance prior to getting up out of chair/bed. Problem: Intellectual/Education/Knowledge Deficit  Goal: Teaching initiated upon admission  Outcome: Adequate for Discharge  Note: Patient verbalizes understanding to verbal information given on Venofer ,action and possible side effects. Aware to call MD if develop complications. Goal: Written Disposition Instruction form completed  Outcome: Adequate for Discharge  Intervention: Verbal/written education provided  Note: Venofer reviewed, patient verbalizes understanding of medication being administered and potential side effects. Problem: Discharge Planning  Goal: Knowledge of discharge instructions  Description: Knowledge of discharge instructions  Outcome: Adequate for Discharge  Note: Verbalized understanding of discharge instructions, follow-up appointments, and when to call the physician. Intervention: Interaction with patient/family and care team  Note: Discuss understanding of discharge instructions,follow-up appointments, and when to call the physician. Care plan reviewed with patient. Patient verbalize understanding of the plan of care and contribute to goal setting.

## 2022-10-07 ENCOUNTER — HOSPITAL ENCOUNTER (OUTPATIENT)
Dept: INFUSION THERAPY | Age: 52
Discharge: HOME | End: 2022-10-07
Payer: COMMERCIAL

## 2022-10-07 VITALS
RESPIRATION RATE: 18 BRPM | DIASTOLIC BLOOD PRESSURE: 76 MMHG | BODY MASS INDEX: 37.31 KG/M2 | WEIGHT: 260 LBS | SYSTOLIC BLOOD PRESSURE: 132 MMHG | HEART RATE: 68 BPM | TEMPERATURE: 97.6 F | OXYGEN SATURATION: 99 %

## 2022-10-07 DIAGNOSIS — D50.9 IRON DEFICIENCY ANEMIA, UNSPECIFIED IRON DEFICIENCY ANEMIA TYPE: ICD-10-CM

## 2022-10-07 DIAGNOSIS — K90.9 MALABSORPTION OF IRON: Primary | ICD-10-CM

## 2022-10-07 PROCEDURE — 6360000002 HC RX W HCPCS: Performed by: NURSE PRACTITIONER

## 2022-10-07 PROCEDURE — 96365 THER/PROPH/DIAG IV INF INIT: CPT

## 2022-10-07 PROCEDURE — 2580000003 HC RX 258: Performed by: NURSE PRACTITIONER

## 2022-10-07 PROCEDURE — 96366 THER/PROPH/DIAG IV INF ADDON: CPT

## 2022-10-07 PROCEDURE — 96375 TX/PRO/DX INJ NEW DRUG ADDON: CPT

## 2022-10-07 RX ORDER — METHYLPREDNISOLONE SODIUM SUCCINATE 125 MG/2ML
125 INJECTION, POWDER, LYOPHILIZED, FOR SOLUTION INTRAMUSCULAR; INTRAVENOUS ONCE
Status: CANCELLED | OUTPATIENT
Start: 2022-10-07 | End: 2022-10-07

## 2022-10-07 RX ORDER — SODIUM CHLORIDE 9 MG/ML
INJECTION, SOLUTION INTRAVENOUS ONCE
Status: COMPLETED | OUTPATIENT
Start: 2022-10-07 | End: 2022-10-07

## 2022-10-07 RX ORDER — PALONOSETRON 0.05 MG/ML
0.25 INJECTION, SOLUTION INTRAVENOUS ONCE
Status: COMPLETED | OUTPATIENT
Start: 2022-10-07 | End: 2022-10-07

## 2022-10-07 RX ORDER — DIPHENHYDRAMINE HYDROCHLORIDE 50 MG/ML
50 INJECTION INTRAMUSCULAR; INTRAVENOUS ONCE
Status: CANCELLED | OUTPATIENT
Start: 2022-10-07 | End: 2022-10-07

## 2022-10-07 RX ORDER — 0.9 % SODIUM CHLORIDE 0.9 %
10 VIAL (ML) INJECTION ONCE
Status: CANCELLED | OUTPATIENT
Start: 2022-10-07 | End: 2022-10-07

## 2022-10-07 RX ORDER — HEPARIN SODIUM (PORCINE) LOCK FLUSH IV SOLN 100 UNIT/ML 100 UNIT/ML
500 SOLUTION INTRAVENOUS PRN
Status: CANCELLED | OUTPATIENT
Start: 2022-10-07

## 2022-10-07 RX ORDER — SODIUM CHLORIDE 0.9 % (FLUSH) 0.9 %
5 SYRINGE (ML) INJECTION PRN
Status: CANCELLED | OUTPATIENT
Start: 2022-10-07

## 2022-10-07 RX ORDER — SODIUM CHLORIDE 0.9 % (FLUSH) 0.9 %
10 SYRINGE (ML) INJECTION PRN
Status: CANCELLED | OUTPATIENT
Start: 2022-10-07

## 2022-10-07 RX ORDER — SODIUM CHLORIDE 9 MG/ML
INJECTION, SOLUTION INTRAVENOUS CONTINUOUS
Status: CANCELLED | OUTPATIENT
Start: 2022-10-07

## 2022-10-07 RX ADMIN — SODIUM CHLORIDE: 9 INJECTION, SOLUTION INTRAVENOUS at 14:28

## 2022-10-07 RX ADMIN — PALONOSETRON 0.25 MG: 0.05 INJECTION, SOLUTION INTRAVENOUS at 14:29

## 2022-10-07 RX ADMIN — IRON SUCROSE 300 MG: 20 INJECTION, SOLUTION INTRAVENOUS at 14:33

## 2022-10-07 NOTE — PROGRESS NOTES
Patient assessed for the following post venofer infusion:    Dizziness   No  Lightheadedness  No      Acute nausea/vomiting No  Headache   No  Chest pain/pressure  No  Rash/itching   No  Shortness of breath  No    Patient kept for 20 minutes observation post infusion. Patient tolerated venofer infusion without any complications. Last vital signs:   /76   Pulse 68   Temp 97.6 °F (36.4 °C) (Oral)   Resp 18   Wt 260 lb (117.9 kg)   SpO2 99%   BMI 37.31 kg/m²       Patient instructed if experience any of the above symptoms following today's infusion, she is to notify MD immediately or go to the emergency department. Discharge instructions given to patient. Verbalizes understanding. Ambulated off unit per self with belongings.

## 2022-10-07 NOTE — PLAN OF CARE
Problem: Musculor/Skeletal Functional Status  Goal: Absence of falls  Outcome: Adequate for Discharge  Note: Patient free of falls this visit. Intervention: Fall precautions  Note: Fall risks assessed. Precautions discussed. Call light within reach during visit. Problem: Intellectual/Education/Knowledge Deficit  Goal: Teaching initiated upon admission  Outcome: Adequate for Discharge  Note: Patient verbalizes understanding to verbal information given on venofer infusion, including action and possible side effects. Aware to call MD if develop complications. Intervention: Verbal/written education provided  Note: Verbal education provided on venofer infusion prior to administration. Problem: Discharge Planning  Goal: Knowledge of discharge instructions  Description: Knowledge of discharge instructions  Outcome: Adequate for Discharge  Note: Patient verbalizes understanding of discharge instructions, follow up appointment, and when to call physician if needed   Intervention: Interaction with patient/family and care team  Note: Discharge instructions given to pt and reviewed. Follow up appointments discussed. Care plan reviewed with patient. Patient verbalizes understanding of the plan of care and contributes to goal setting.

## 2022-12-12 ENCOUNTER — NURSE ONLY (OUTPATIENT)
Dept: LAB | Age: 52
End: 2022-12-12

## 2022-12-12 DIAGNOSIS — Z98.84 HISTORY OF GASTRIC BYPASS: ICD-10-CM

## 2022-12-12 DIAGNOSIS — E61.1 IRON DEFICIENCY: ICD-10-CM

## 2022-12-12 DIAGNOSIS — D50.9 IRON DEFICIENCY ANEMIA, UNSPECIFIED IRON DEFICIENCY ANEMIA TYPE: ICD-10-CM

## 2022-12-12 DIAGNOSIS — E61.1 IRON DEFICIENCY: Primary | ICD-10-CM

## 2022-12-12 LAB
BASOPHILS # BLD: 0.8 %
BASOPHILS ABSOLUTE: 0 THOU/MM3 (ref 0–0.1)
EOSINOPHIL # BLD: 2.9 %
EOSINOPHILS ABSOLUTE: 0.1 THOU/MM3 (ref 0–0.4)
ERYTHROCYTE [DISTWIDTH] IN BLOOD BY AUTOMATED COUNT: 13 % (ref 11.5–14.5)
ERYTHROCYTE [DISTWIDTH] IN BLOOD BY AUTOMATED COUNT: 42.9 FL (ref 35–45)
FERRITIN: 195 NG/ML (ref 10–291)
HCT VFR BLD CALC: 39.6 % (ref 37–47)
HEMOGLOBIN: 13.1 GM/DL (ref 12–16)
IMMATURE GRANS (ABS): 0.03 THOU/MM3 (ref 0–0.07)
IMMATURE GRANULOCYTES: 0.6 %
IRON: 109 UG/DL (ref 50–170)
LYMPHOCYTES # BLD: 17.8 %
LYMPHOCYTES ABSOLUTE: 0.9 THOU/MM3 (ref 1–4.8)
MCH RBC QN AUTO: 30 PG (ref 26–33)
MCHC RBC AUTO-ENTMCNC: 33.1 GM/DL (ref 32.2–35.5)
MCV RBC AUTO: 90.6 FL (ref 81–99)
MONOCYTES # BLD: 13.9 %
MONOCYTES ABSOLUTE: 0.7 THOU/MM3 (ref 0.4–1.3)
NUCLEATED RED BLOOD CELLS: 0 /100 WBC
PLATELET # BLD: 290 THOU/MM3 (ref 130–400)
PMV BLD AUTO: 9.6 FL (ref 9.4–12.4)
RBC # BLD: 4.37 MILL/MM3 (ref 4.2–5.4)
SEG NEUTROPHILS: 64 %
SEGMENTED NEUTROPHILS ABSOLUTE COUNT: 3.1 THOU/MM3 (ref 1.8–7.7)
TOTAL IRON BINDING CAPACITY: 337 UG/DL (ref 171–450)
WBC # BLD: 4.9 THOU/MM3 (ref 4.8–10.8)

## 2023-01-03 ENCOUNTER — HOSPITAL ENCOUNTER (OUTPATIENT)
Dept: INFUSION THERAPY | Age: 53
Discharge: HOME | End: 2023-01-03
Payer: COMMERCIAL

## 2023-01-03 ENCOUNTER — OFFICE VISIT (OUTPATIENT)
Dept: ONCOLOGY | Age: 53
End: 2023-01-03
Payer: COMMERCIAL

## 2023-01-03 VITALS
SYSTOLIC BLOOD PRESSURE: 118 MMHG | RESPIRATION RATE: 18 BRPM | TEMPERATURE: 98.2 F | BODY MASS INDEX: 37.51 KG/M2 | HEIGHT: 70 IN | DIASTOLIC BLOOD PRESSURE: 78 MMHG | OXYGEN SATURATION: 96 % | HEART RATE: 85 BPM | WEIGHT: 262 LBS

## 2023-01-03 VITALS
HEART RATE: 85 BPM | SYSTOLIC BLOOD PRESSURE: 118 MMHG | RESPIRATION RATE: 18 BRPM | TEMPERATURE: 98.2 F | DIASTOLIC BLOOD PRESSURE: 78 MMHG

## 2023-01-03 DIAGNOSIS — R53.83 OTHER FATIGUE: ICD-10-CM

## 2023-01-03 DIAGNOSIS — Z98.84 HISTORY OF GASTRIC BYPASS: ICD-10-CM

## 2023-01-03 DIAGNOSIS — E61.1 IRON DEFICIENCY: Primary | ICD-10-CM

## 2023-01-03 DIAGNOSIS — B96.89 ACUTE BACTERIAL SINUSITIS: ICD-10-CM

## 2023-01-03 DIAGNOSIS — J01.90 ACUTE BACTERIAL SINUSITIS: ICD-10-CM

## 2023-01-03 DIAGNOSIS — E61.1 IRON DEFICIENCY: ICD-10-CM

## 2023-01-03 LAB
BUN, WHOLE BLOOD: 18 MG/DL (ref 8–26)
CHLORIDE, WHOLE BLOOD: 99 MEQ/L (ref 98–109)
CREATININE, WHOLE BLOOD: 0.7 MG/DL (ref 0.5–1.2)
FOLATE: > 20 NG/ML (ref 4.8–24.2)
GFR SERPL CREATININE-BSD FRML MDRD: > 60 ML/MIN/1.73M2
GLUCOSE, WHOLE BLOOD: 104 MG/DL (ref 70–108)
IONIZED CALCIUM, WHOLE BLOOD: 1.14 MMOL/L (ref 1.12–1.32)
MAGNESIUM: 1.9 MG/DL (ref 1.6–2.4)
POTASSIUM, WHOLE BLOOD: 4.6 MEQ/L (ref 3.5–4.9)
SODIUM, WHOLE BLOOD: 138 MEQ/L (ref 138–146)
TOTAL CO2, WHOLE BLOOD: 31 MEQ/L (ref 23–33)
VITAMIN B-12: 464 PG/ML (ref 211–911)

## 2023-01-03 PROCEDURE — 99211 OFF/OP EST MAY X REQ PHY/QHP: CPT

## 2023-01-03 PROCEDURE — 3074F SYST BP LT 130 MM HG: CPT | Performed by: PHYSICIAN ASSISTANT

## 2023-01-03 PROCEDURE — 36415 COLL VENOUS BLD VENIPUNCTURE: CPT

## 2023-01-03 PROCEDURE — 80047 BASIC METABLC PNL IONIZED CA: CPT

## 2023-01-03 PROCEDURE — 83735 ASSAY OF MAGNESIUM: CPT

## 2023-01-03 PROCEDURE — 82607 VITAMIN B-12: CPT

## 2023-01-03 PROCEDURE — 99213 OFFICE O/P EST LOW 20 MIN: CPT | Performed by: PHYSICIAN ASSISTANT

## 2023-01-03 PROCEDURE — 3078F DIAST BP <80 MM HG: CPT | Performed by: PHYSICIAN ASSISTANT

## 2023-01-03 PROCEDURE — 82746 ASSAY OF FOLIC ACID SERUM: CPT

## 2023-01-03 RX ORDER — AZITHROMYCIN 250 MG/1
250 TABLET, FILM COATED ORAL SEE ADMIN INSTRUCTIONS
Qty: 6 TABLET | Refills: 0 | Status: SHIPPED | OUTPATIENT
Start: 2023-01-03 | End: 2023-01-08

## 2023-01-03 NOTE — PATIENT INSTRUCTIONS
Will obtain vitamin B12, folate, BMP, Mg today  Return to clinic in 3 months  CBC, Iron panel on RTC  Please call for questions or concerns.

## 2023-01-03 NOTE — PROGRESS NOTES
[facility] [address] [facility], [address] [facility] [address]  [address] [address]  Dept: [phone]  Dept Fax: [phone]: [phone]      Visit Date:1/3/2023     Shannon Johnson is a 46 y.o. female who presents today for:   Chief Complaint   Patient presents with    Follow-up     Iron deficiency        HPI:   Shannon Johnson is a 46 y.o. female previously followed by Dr. Aashish Guerrero due to anemia. Her anemia was related to iron deficiency from poor absorption. She has history of gastric bypass surgery. The patient was last seen in the clinic in September 2022. She reported increased fatigue, generalized weakness. Interval History 1/3/2023:   The patient is here for follow-up evaluation. She was last seen in the clinic by Dr. Aashish Guerrero in September 2022. At that time she had reported fatigue, generalized weakness. She was recommended IV Venofer therapy. She received third infusion on 10/7/2022. The patient reports overall improvement in fatigue, generalized weakness after IV iron infusions. She reports over the last week persistent maxillary facial pressure, congestion. Symptoms present for 7 days. She requests antibiotic today, prescription given for zpak. History of allergy to fluoroquinolones. She affirms intermittent leg cramping, tightness. Denies any abnormal bleeding. PMH, SH, and FH:  I reviewed the patients medication list and allergy list as noted on the electronic medical record. The PMH, SH and FH were also reviewed as noted on the EMR. 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 Review of Systems:   Review of Systems   Pertinent review of systems noted in HPI, all other ROS negative.    Objective:   Physical Exam   /78 (Site: Right Upper Arm, Position: Sitting, Cuff Size: Medium Adult)   Pulse 85   Temp 98.2 °F (36.8 °C) (Oral)   Resp 18   Ht 5' 10\" (1.778 m)   Wt 262 lb (118.8 kg)   SpO2 96%   BMI 37.59 kg/m²    General appearance: No apparent distress, well developed, and cooperative. HEENT: Pupils equal, round, and reactive to light. Conjunctivae/corneas clear. Oral mucosa moist. Facial tenderness in maxillary region bilaterally. Neck: Supple, with full range of motion. Trachea midline. Respiratory:  Normal respiratory effort. Clear to auscultation, bilaterally without Rales/Wheezes/Rhonchi. Cardiovascular: Regular rate and rhythm with normal S1/S2  Abdomen: Soft, active bowel sounds. Musculoskeletal: No clubbing, cyanosis or edema bilaterally. Ambulates in office. Skin: Skin color, texture, turgor normal.  No visible rashes or lesions. Neurologic:  Neurovascularly intact without any focal sensory/motor deficits. Psychiatric: Alert and oriented      Imaging Studies and Labs:   CBC:   Lab Results   Component Value Date    WBC 4.9 12/12/2022    HGB 13.1 12/12/2022    HCT 39.6 12/12/2022    MCV 90.6 12/12/2022     12/12/2022     BMP:   Lab Results   Component Value Date/Time     01/31/2014 09:04 AM    K 4.1 01/31/2014 09:04 AM     01/31/2014 09:04 AM    CO2 30 01/31/2014 09:04 AM    BUN 14 01/31/2014 09:04 AM    CREATININE 0.6 01/31/2014 09:04 AM    GLUCOSE 92 01/31/2014 09:04 AM    GLUCOSE 89 01/04/2012 08:10 AM    CALCIUM 8.7 01/31/2014 09:04 AM      LFT:   Lab Results   Component Value Date    ALT 21 01/31/2014    AST 24 01/31/2014    ALKPHOS 51 01/31/2014    BILITOT 0.5 01/31/2014         Assessment and Plan:   Iron Deficiency   Secondary to poor absorption from history of gastric bypass surgery. She received IV Venofer in September/October 2022. Recent lab work on 12/12/2022 showed improvement in overall Hgb/Hct and iron levels. CBC showed Hgb 13.1, Hct 39.6, MCV 90.6.  Ferritin 195, iron 109, TIBC 337.    -will obtain folate, vitamin B12, Mg due to fatigue, leg cramping.    -will continue to monitor Hgb/Hct and iron levels   -Patient instructed to monitor for signs/symptoms of anemia   -Return to clinic in 3 months   -CBC, iron panel on RTC    2. History of Gastric Bypass   3. Acute Sinusitis - prescription for zpak sent to pharmacy.     RTC in 3 months     All patient questions answered. Pt voiced understanding. Patient agreed with treatment plan. Follow up as directed. Patient instructed to call for questions or concerns.          Electronically signed by   Mariel Walker PA-C

## 2023-03-31 DIAGNOSIS — E61.1 IRON DEFICIENCY: Primary | ICD-10-CM

## 2023-03-31 DIAGNOSIS — Z98.84 HISTORY OF GASTRIC BYPASS: ICD-10-CM

## 2023-04-03 ENCOUNTER — HOSPITAL ENCOUNTER (OUTPATIENT)
Dept: INFUSION THERAPY | Age: 53
Discharge: HOME | End: 2023-04-03
Payer: COMMERCIAL

## 2023-04-03 VITALS
TEMPERATURE: 98.5 F | OXYGEN SATURATION: 99 % | DIASTOLIC BLOOD PRESSURE: 65 MMHG | SYSTOLIC BLOOD PRESSURE: 141 MMHG | HEART RATE: 78 BPM

## 2023-04-03 DIAGNOSIS — Z98.84 HISTORY OF GASTRIC BYPASS: ICD-10-CM

## 2023-04-03 DIAGNOSIS — E61.1 IRON DEFICIENCY: ICD-10-CM

## 2023-04-03 LAB
ABSOLUTE IMMATURE GRANULOCYTE: 0.01 THOU/MM3 (ref 0–0.07)
BASOPHILS ABSOLUTE: 0 THOU/MM3 (ref 0–0.1)
BASOPHILS NFR BLD AUTO: 1 % (ref 0–3)
EOSINOPHIL NFR BLD AUTO: 3 % (ref 0–4)
EOSINOPHILS ABSOLUTE: 0.1 THOU/MM3 (ref 0–0.4)
ERYTHROCYTE [DISTWIDTH] IN BLOOD BY AUTOMATED COUNT: 12.9 % (ref 11.5–14.5)
FERRITIN SERPL IA-MCNC: 50 NG/ML (ref 10–291)
HCT VFR BLD AUTO: 36.4 % (ref 37–47)
HGB BLD-MCNC: 12.2 GM/DL (ref 12–16)
IMMATURE GRANULOCYTES: 0 %
IRON SERPL-MCNC: 68 UG/DL (ref 50–170)
LYMPHOCYTES ABSOLUTE: 0.7 THOU/MM3 (ref 1–4.8)
LYMPHOCYTES NFR BLD AUTO: 17 % (ref 15–47)
MCH RBC QN AUTO: 29.2 PG (ref 26–33)
MCHC RBC AUTO-ENTMCNC: 33.5 GM/DL (ref 32.2–35.5)
MCV RBC AUTO: 87 FL (ref 81–99)
MONOCYTES ABSOLUTE: 0.5 THOU/MM3 (ref 0.4–1.3)
MONOCYTES NFR BLD AUTO: 14 % (ref 0–12)
NEUTROPHILS NFR BLD AUTO: 65 % (ref 43–75)
PLATELET # BLD AUTO: 244 THOU/MM3 (ref 130–400)
PMV BLD AUTO: 8.9 FL (ref 9.4–12.4)
RBC # BLD AUTO: 4.18 MILL/MM3 (ref 4.2–5.4)
SEGMENTED NEUTROPHILS ABSOLUTE COUNT: 2.5 THOU/MM3 (ref 1.8–7.7)
TIBC SERPL-MCNC: 298 UG/DL (ref 171–450)
WBC # BLD AUTO: 3.8 THOU/MM3 (ref 4.8–10.8)

## 2023-04-03 PROCEDURE — 83550 IRON BINDING TEST: CPT

## 2023-04-03 PROCEDURE — 36415 COLL VENOUS BLD VENIPUNCTURE: CPT

## 2023-04-03 PROCEDURE — 85025 COMPLETE CBC W/AUTO DIFF WBC: CPT

## 2023-04-03 PROCEDURE — 99211 OFF/OP EST MAY X REQ PHY/QHP: CPT

## 2023-04-03 PROCEDURE — 82728 ASSAY OF FERRITIN: CPT

## 2023-04-03 PROCEDURE — 83540 ASSAY OF IRON: CPT

## 2023-04-04 ENCOUNTER — TELEPHONE (OUTPATIENT)
Dept: ONCOLOGY | Age: 53
End: 2023-04-04

## 2023-04-06 ENCOUNTER — TELEPHONE (OUTPATIENT)
Dept: ONCOLOGY | Age: 53
End: 2023-04-06

## 2023-04-06 RX ORDER — ONDANSETRON 2 MG/ML
8 INJECTION INTRAMUSCULAR; INTRAVENOUS
OUTPATIENT
Start: 2023-04-06

## 2023-04-06 RX ORDER — FAMOTIDINE 10 MG/ML
20 INJECTION, SOLUTION INTRAVENOUS
OUTPATIENT
Start: 2023-04-06

## 2023-04-06 RX ORDER — EPINEPHRINE 1 MG/ML
0.3 INJECTION, SOLUTION, CONCENTRATE INTRAVENOUS PRN
OUTPATIENT
Start: 2023-04-06

## 2023-04-06 RX ORDER — SODIUM CHLORIDE 9 MG/ML
5-250 INJECTION, SOLUTION INTRAVENOUS PRN
OUTPATIENT
Start: 2023-04-06

## 2023-04-06 RX ORDER — SODIUM CHLORIDE 9 MG/ML
INJECTION, SOLUTION INTRAVENOUS CONTINUOUS
OUTPATIENT
Start: 2023-04-06

## 2023-04-06 RX ORDER — ALBUTEROL SULFATE 90 UG/1
4 AEROSOL, METERED RESPIRATORY (INHALATION) PRN
OUTPATIENT
Start: 2023-04-06

## 2023-04-06 RX ORDER — DIPHENHYDRAMINE HYDROCHLORIDE 50 MG/ML
50 INJECTION INTRAMUSCULAR; INTRAVENOUS
OUTPATIENT
Start: 2023-04-06

## 2023-04-06 RX ORDER — HEPARIN SODIUM (PORCINE) LOCK FLUSH IV SOLN 100 UNIT/ML 100 UNIT/ML
500 SOLUTION INTRAVENOUS PRN
OUTPATIENT
Start: 2023-04-06

## 2023-04-06 RX ORDER — SODIUM CHLORIDE 0.9 % (FLUSH) 0.9 %
5-40 SYRINGE (ML) INJECTION PRN
OUTPATIENT
Start: 2023-04-06

## 2023-04-06 RX ORDER — ACETAMINOPHEN 325 MG/1
650 TABLET ORAL
OUTPATIENT
Start: 2023-04-06

## 2023-04-06 RX ORDER — ONDANSETRON 2 MG/ML
4 INJECTION INTRAMUSCULAR; INTRAVENOUS ONCE
Start: 2023-04-06 | End: 2023-04-06

## 2023-04-06 NOTE — TELEPHONE ENCOUNTER
Called and discussed labs. Reviewed iron results and recommend IV iron. Patient agreeable. Orders placed.
Patient called requesting a call back to go over her lab results.
No complaints

## 2023-04-11 ENCOUNTER — HOSPITAL ENCOUNTER (OUTPATIENT)
Dept: INFUSION THERAPY | Age: 53
Discharge: HOME | End: 2023-04-11
Payer: COMMERCIAL

## 2023-04-11 VITALS
SYSTOLIC BLOOD PRESSURE: 121 MMHG | HEART RATE: 73 BPM | DIASTOLIC BLOOD PRESSURE: 64 MMHG | OXYGEN SATURATION: 98 % | TEMPERATURE: 98.8 F | RESPIRATION RATE: 16 BRPM

## 2023-04-11 DIAGNOSIS — K90.9 MALABSORPTION OF IRON: Primary | ICD-10-CM

## 2023-04-11 DIAGNOSIS — D50.9 IRON DEFICIENCY ANEMIA, UNSPECIFIED IRON DEFICIENCY ANEMIA TYPE: ICD-10-CM

## 2023-04-11 DIAGNOSIS — E61.1 IRON DEFICIENCY: ICD-10-CM

## 2023-04-11 PROCEDURE — 96374 THER/PROPH/DIAG INJ IV PUSH: CPT

## 2023-04-11 PROCEDURE — 96366 THER/PROPH/DIAG IV INF ADDON: CPT

## 2023-04-11 PROCEDURE — 96367 TX/PROPH/DG ADDL SEQ IV INF: CPT

## 2023-04-11 PROCEDURE — 6360000002 HC RX W HCPCS: Performed by: PHYSICIAN ASSISTANT

## 2023-04-11 PROCEDURE — 96375 TX/PRO/DX INJ NEW DRUG ADDON: CPT

## 2023-04-11 PROCEDURE — 96365 THER/PROPH/DIAG IV INF INIT: CPT

## 2023-04-11 PROCEDURE — 2580000003 HC RX 258: Performed by: PHYSICIAN ASSISTANT

## 2023-04-11 RX ORDER — DIPHENHYDRAMINE HYDROCHLORIDE 50 MG/ML
50 INJECTION INTRAMUSCULAR; INTRAVENOUS
Status: CANCELLED | OUTPATIENT
Start: 2023-04-18

## 2023-04-11 RX ORDER — SODIUM CHLORIDE 9 MG/ML
INJECTION, SOLUTION INTRAVENOUS CONTINUOUS
Status: CANCELLED | OUTPATIENT
Start: 2023-04-18

## 2023-04-11 RX ORDER — ACETAMINOPHEN 325 MG/1
650 TABLET ORAL
Status: CANCELLED | OUTPATIENT
Start: 2023-04-18

## 2023-04-11 RX ORDER — ONDANSETRON 2 MG/ML
4 INJECTION INTRAMUSCULAR; INTRAVENOUS ONCE
Status: COMPLETED | OUTPATIENT
Start: 2023-04-11 | End: 2023-04-11

## 2023-04-11 RX ORDER — ALBUTEROL SULFATE 90 UG/1
4 AEROSOL, METERED RESPIRATORY (INHALATION) PRN
Status: CANCELLED | OUTPATIENT
Start: 2023-04-18

## 2023-04-11 RX ORDER — HEPARIN SODIUM (PORCINE) LOCK FLUSH IV SOLN 100 UNIT/ML 100 UNIT/ML
500 SOLUTION INTRAVENOUS PRN
Status: CANCELLED | OUTPATIENT
Start: 2023-04-18

## 2023-04-11 RX ORDER — SODIUM CHLORIDE 0.9 % (FLUSH) 0.9 %
5-40 SYRINGE (ML) INJECTION PRN
Status: CANCELLED | OUTPATIENT
Start: 2023-04-18

## 2023-04-11 RX ORDER — ONDANSETRON 2 MG/ML
8 INJECTION INTRAMUSCULAR; INTRAVENOUS
Status: CANCELLED | OUTPATIENT
Start: 2023-04-18

## 2023-04-11 RX ORDER — EPINEPHRINE 1 MG/ML
0.3 INJECTION, SOLUTION INTRAMUSCULAR; SUBCUTANEOUS PRN
Status: CANCELLED | OUTPATIENT
Start: 2023-04-18

## 2023-04-11 RX ORDER — SODIUM CHLORIDE 9 MG/ML
5-250 INJECTION, SOLUTION INTRAVENOUS PRN
Status: CANCELLED | OUTPATIENT
Start: 2023-04-18

## 2023-04-11 RX ORDER — SODIUM CHLORIDE 9 MG/ML
5-250 INJECTION, SOLUTION INTRAVENOUS PRN
Status: DISCONTINUED | OUTPATIENT
Start: 2023-04-11 | End: 2023-04-12 | Stop reason: HOSPADM

## 2023-04-11 RX ORDER — ONDANSETRON 2 MG/ML
4 INJECTION INTRAMUSCULAR; INTRAVENOUS ONCE
Status: CANCELLED
Start: 2023-04-18 | End: 2023-04-18

## 2023-04-11 RX ADMIN — SODIUM CHLORIDE 250 ML/HR: 9 INJECTION, SOLUTION INTRAVENOUS at 14:14

## 2023-04-11 RX ADMIN — IRON SUCROSE 300 MG: 20 INJECTION, SOLUTION INTRAVENOUS at 15:33

## 2023-04-11 RX ADMIN — ONDANSETRON 4 MG: 2 INJECTION INTRAMUSCULAR; INTRAVENOUS at 14:19

## 2023-04-11 NOTE — PROGRESS NOTES
Patient assessed for the following post iron infusion:    Dizziness   No  Lightheadedness  No      Acute nausea/vomiting Yes, mild nausea. Headache   No  Chest pain/pressure  No  Rash/itching   No  Shortness of breath  No    Patient kept for 20 minutes observation post infusion. Patient tolerated treatment Venofer without any complications. Last vital signs:   /64   Pulse 73   Temp 98.8 °F (37.1 °C) (Oral)   Resp 16   SpO2 98%     Patient instructed if experience any of the above symptoms following today's infusion,he/she is to notify MD immediately or go to the emergency department. Discharge instructions given to patient. Verbalizes understanding. Ambulated off unit per self with belongings.

## 2023-04-11 NOTE — PLAN OF CARE
Problem: Musculor/Skeletal Functional Status  Goal: Absence of falls  Outcome: Progressing  Note: No falls occurred with visit today. Intervention: Fall precautions  Note: Verbalized understanding of fall prevention to ask for assistance with ambulation. Call light within reach. Problem: Intellectual/Education/Knowledge Deficit  Goal: Teaching initiated upon admission  Outcome: Progressing  Note: Patient verbalizes understanding to verbal information given on Venofer action and possible side effects. Aware to call MD if develop complications. Goal: Written Disposition Instruction form completed  Outcome: Progressing  Intervention: Verbal/written education provided  Note: Venofer reviewed, patient verbalizes understanding of medication being administered and potential side effects. Problem: Discharge Planning  Goal: Knowledge of discharge instructions  Description: Knowledge of discharge instructions  Outcome: Progressing  Note: Verbalized understanding of discharge instructions, follow-up appointments, and when to call the physician. Intervention: Interaction with patient/family and care team  Note: Discuss understanding of discharge instructions,follow-up appointments, and when to call the physician. Care plan reviewed with patient. Patient verbalizes understanding of the plan of care and contribute to goal setting.

## 2023-04-11 NOTE — DISCHARGE INSTRUCTIONS
You received IV Venofer (and IV Zofran) while in outpatient oncology clinic. Call if any uncontrolled nausea/vomiting  Call if any fevers/chills/ problems or concerns  Call if any bleeding  Call if any chest pain/pressure  Call if any severe shortness of breath  Drink at least (6) 8oz glasses of fluids daily     If develop any of above condition, please call your MD or go to Emergency Department.

## 2023-04-18 ENCOUNTER — HOSPITAL ENCOUNTER (OUTPATIENT)
Dept: INFUSION THERAPY | Age: 53
Discharge: HOME | End: 2023-04-18
Payer: COMMERCIAL

## 2023-04-18 VITALS
TEMPERATURE: 98.4 F | RESPIRATION RATE: 16 BRPM | HEART RATE: 60 BPM | SYSTOLIC BLOOD PRESSURE: 134 MMHG | HEIGHT: 70 IN | BODY MASS INDEX: 40.17 KG/M2 | OXYGEN SATURATION: 98 % | WEIGHT: 280.6 LBS | DIASTOLIC BLOOD PRESSURE: 75 MMHG

## 2023-04-18 DIAGNOSIS — D50.9 IRON DEFICIENCY ANEMIA, UNSPECIFIED IRON DEFICIENCY ANEMIA TYPE: ICD-10-CM

## 2023-04-18 DIAGNOSIS — E61.1 IRON DEFICIENCY: ICD-10-CM

## 2023-04-18 DIAGNOSIS — K90.9 MALABSORPTION OF IRON: Primary | ICD-10-CM

## 2023-04-18 PROCEDURE — 96365 THER/PROPH/DIAG IV INF INIT: CPT

## 2023-04-18 PROCEDURE — 6360000002 HC RX W HCPCS: Performed by: PHYSICIAN ASSISTANT

## 2023-04-18 PROCEDURE — 96375 TX/PRO/DX INJ NEW DRUG ADDON: CPT

## 2023-04-18 PROCEDURE — 96366 THER/PROPH/DIAG IV INF ADDON: CPT

## 2023-04-18 PROCEDURE — 2580000003 HC RX 258: Performed by: PHYSICIAN ASSISTANT

## 2023-04-18 RX ORDER — ACETAMINOPHEN 325 MG/1
650 TABLET ORAL
Status: CANCELLED | OUTPATIENT
Start: 2023-04-25

## 2023-04-18 RX ORDER — HEPARIN SODIUM (PORCINE) LOCK FLUSH IV SOLN 100 UNIT/ML 100 UNIT/ML
500 SOLUTION INTRAVENOUS PRN
Status: CANCELLED | OUTPATIENT
Start: 2023-04-25

## 2023-04-18 RX ORDER — ONDANSETRON 2 MG/ML
4 INJECTION INTRAMUSCULAR; INTRAVENOUS ONCE
Status: COMPLETED | OUTPATIENT
Start: 2023-04-18 | End: 2023-04-18

## 2023-04-18 RX ORDER — SCOLOPAMINE TRANSDERMAL SYSTEM 1 MG/1
PATCH, EXTENDED RELEASE TRANSDERMAL
Qty: 4 PATCH | Refills: 2 | Status: SHIPPED | OUTPATIENT
Start: 2023-04-18

## 2023-04-18 RX ORDER — SODIUM CHLORIDE 9 MG/ML
5-250 INJECTION, SOLUTION INTRAVENOUS PRN
Status: CANCELLED | OUTPATIENT
Start: 2023-04-25

## 2023-04-18 RX ORDER — ONDANSETRON 2 MG/ML
8 INJECTION INTRAMUSCULAR; INTRAVENOUS
Status: CANCELLED | OUTPATIENT
Start: 2023-04-25

## 2023-04-18 RX ORDER — SODIUM CHLORIDE 0.9 % (FLUSH) 0.9 %
5-40 SYRINGE (ML) INJECTION PRN
Status: CANCELLED | OUTPATIENT
Start: 2023-04-25

## 2023-04-18 RX ORDER — DIPHENHYDRAMINE HYDROCHLORIDE 50 MG/ML
50 INJECTION INTRAMUSCULAR; INTRAVENOUS
Status: CANCELLED | OUTPATIENT
Start: 2023-04-25

## 2023-04-18 RX ORDER — SODIUM CHLORIDE 9 MG/ML
INJECTION, SOLUTION INTRAVENOUS CONTINUOUS
Status: CANCELLED | OUTPATIENT
Start: 2023-04-25

## 2023-04-18 RX ORDER — EPINEPHRINE 1 MG/ML
0.3 INJECTION, SOLUTION INTRAMUSCULAR; SUBCUTANEOUS PRN
Status: CANCELLED | OUTPATIENT
Start: 2023-04-25

## 2023-04-18 RX ORDER — ONDANSETRON 2 MG/ML
4 INJECTION INTRAMUSCULAR; INTRAVENOUS ONCE
Status: CANCELLED
Start: 2023-04-25 | End: 2023-04-25

## 2023-04-18 RX ORDER — SODIUM CHLORIDE 9 MG/ML
5-250 INJECTION, SOLUTION INTRAVENOUS PRN
Status: DISCONTINUED | OUTPATIENT
Start: 2023-04-18 | End: 2023-04-19 | Stop reason: HOSPADM

## 2023-04-18 RX ORDER — ALBUTEROL SULFATE 90 UG/1
4 AEROSOL, METERED RESPIRATORY (INHALATION) PRN
Status: CANCELLED | OUTPATIENT
Start: 2023-04-25

## 2023-04-18 RX ADMIN — ONDANSETRON 4 MG: 2 INJECTION INTRAMUSCULAR; INTRAVENOUS at 14:05

## 2023-04-18 RX ADMIN — SODIUM CHLORIDE 250 ML/HR: 9 INJECTION, SOLUTION INTRAVENOUS at 14:03

## 2023-04-18 RX ADMIN — IRON SUCROSE 300 MG: 20 INJECTION, SOLUTION INTRAVENOUS at 14:37

## 2023-04-18 NOTE — DISCHARGE INSTRUCTIONS
Please contact your Oncologist if you have any questions regarding the Venofer that you received today. You are instructed to call the office or go to the Emergency Dept. If you experience any of the following symptoms:    Dizziness/lightheadedness   Acute nausea or vomiting-not relieved by medications  Headaches-not relieved by medications  New chest pain or pressure  New rash /itching  New shortness of breath  Fever,chills or signs or symptoms of infection    Make sure you are drinking 48 to 64 ounces of water daily-if you are unable to drink fluids let us know right away.

## 2023-04-18 NOTE — PLAN OF CARE
Problem: Musculor/Skeletal Functional Status  Goal: Absence of falls  Outcome: Adequate for Discharge  Note: Free from falls while in O.P. Oncology. Intervention: Fall precautions  Note: Discussed the need to use the call light for assistance when getting up to ambulate. Problem: Intellectual/Education/Knowledge Deficit  Goal: Teaching initiated upon admission  Note: Patient verbalizes understanding to verbal information given on Venofer,action and possible side effects. Aware to call MD if develop complications.     Goal: Written Disposition Instruction form completed  Note: Chemotherapy Teaching     What is Chemotherapy   Drug action [x]   Method of Administration [x]   Handouts given []     Side Effects  Nausea/vomiting [x]   Diarrhea [x]   Fatigue [x]   Signs / Symptoms of infection [x]   Neutropenia [x]   Thrombocytopenia [x]   Alopecia [x]   neuropathy [x]   [place] diet &  the importance of fluids [x]       Micellaneous  Importance of nutrition [x]   Importance of oral hygiene [x]   When to call the MD [x]   Monitoring labs [x]   Use of supportive services []     Explanation of Drug Regimen / Frequency  Venofer     Comments  Verbalized understanding to drug,action,side effects and when to call MD      Intervention: Verbal/written education provided  Note: Chemotherapy Teaching     What is Chemotherapy   Drug action [x]   Method of Administration [x]   Handouts given []     Side Effects  Nausea/vomiting [x]   Diarrhea [x]   Fatigue [x]   Signs / Symptoms of infection [x]   Neutropenia [x]   Thrombocytopenia [x]   Alopecia [x]   neuropathy [x]   [place] diet &  the importance of fluids [x]       Micellaneous  Importance of nutrition [x]   Importance of oral hygiene [x]   When to call the MD [x]   Monitoring labs [x]   Use of supportive services []     Explanation of Drug Regimen / Frequency  Venofer     Comments  Verbalized understanding to drug,action,side effects and when to call MD         Problem:

## 2023-04-18 NOTE — PROGRESS NOTES
Patient tolerated venofer without any complications. Patient kept for 20 minuets observation post venofer. Denies dizziness, lightheadedness, acute nausea or vomiting, headache, heart palpitations, rash/itching or increased SOB. Last vital signs  /75   Pulse 60   Temp 98.4 °F (36.9 °C) (Oral)   Resp 16   Ht 5' 10\" (1.778 m)   Wt 280 lb 9.6 oz (127.3 kg)   SpO2 98%   BMI 40.26 kg/m²     Patient instructed if they experience any of the above symptoms following today's visit, he/she is to notify the Physician or go to the Emergency Dept. Discharge instructions given to patient, Verbalizes understanding. Ambulated off unit per self in stable condition with all belongings.

## 2023-04-25 ENCOUNTER — HOSPITAL ENCOUNTER (OUTPATIENT)
Dept: INFUSION THERAPY | Age: 53
Discharge: HOME | End: 2023-04-25
Payer: COMMERCIAL

## 2023-04-25 VITALS
WEIGHT: 280 LBS | BODY MASS INDEX: 40.09 KG/M2 | RESPIRATION RATE: 16 BRPM | DIASTOLIC BLOOD PRESSURE: 78 MMHG | OXYGEN SATURATION: 98 % | HEART RATE: 64 BPM | TEMPERATURE: 98 F | SYSTOLIC BLOOD PRESSURE: 136 MMHG | HEIGHT: 70 IN

## 2023-04-25 DIAGNOSIS — E61.1 IRON DEFICIENCY: ICD-10-CM

## 2023-04-25 DIAGNOSIS — D50.9 IRON DEFICIENCY ANEMIA, UNSPECIFIED IRON DEFICIENCY ANEMIA TYPE: ICD-10-CM

## 2023-04-25 DIAGNOSIS — K90.9 MALABSORPTION OF IRON: Primary | ICD-10-CM

## 2023-04-25 PROCEDURE — 96375 TX/PRO/DX INJ NEW DRUG ADDON: CPT

## 2023-04-25 PROCEDURE — 2580000003 HC RX 258: Performed by: PHYSICIAN ASSISTANT

## 2023-04-25 PROCEDURE — 6360000002 HC RX W HCPCS: Performed by: PHYSICIAN ASSISTANT

## 2023-04-25 PROCEDURE — 96366 THER/PROPH/DIAG IV INF ADDON: CPT

## 2023-04-25 PROCEDURE — 96365 THER/PROPH/DIAG IV INF INIT: CPT

## 2023-04-25 RX ORDER — ONDANSETRON 2 MG/ML
4 INJECTION INTRAMUSCULAR; INTRAVENOUS ONCE
Status: COMPLETED | OUTPATIENT
Start: 2023-04-25 | End: 2023-04-25

## 2023-04-25 RX ORDER — SODIUM CHLORIDE 9 MG/ML
INJECTION, SOLUTION INTRAVENOUS CONTINUOUS
OUTPATIENT
Start: 2023-04-25

## 2023-04-25 RX ORDER — EPINEPHRINE 1 MG/ML
0.3 INJECTION, SOLUTION INTRAMUSCULAR; SUBCUTANEOUS PRN
OUTPATIENT
Start: 2023-04-25

## 2023-04-25 RX ORDER — DIPHENHYDRAMINE HYDROCHLORIDE 50 MG/ML
50 INJECTION INTRAMUSCULAR; INTRAVENOUS
OUTPATIENT
Start: 2023-04-25

## 2023-04-25 RX ORDER — SODIUM CHLORIDE 9 MG/ML
5-250 INJECTION, SOLUTION INTRAVENOUS PRN
OUTPATIENT
Start: 2023-04-25

## 2023-04-25 RX ORDER — ACETAMINOPHEN 325 MG/1
650 TABLET ORAL
OUTPATIENT
Start: 2023-04-25

## 2023-04-25 RX ORDER — ALBUTEROL SULFATE 90 UG/1
4 AEROSOL, METERED RESPIRATORY (INHALATION) PRN
OUTPATIENT
Start: 2023-04-25

## 2023-04-25 RX ORDER — SODIUM CHLORIDE 0.9 % (FLUSH) 0.9 %
5-40 SYRINGE (ML) INJECTION PRN
OUTPATIENT
Start: 2023-04-25

## 2023-04-25 RX ORDER — ONDANSETRON 2 MG/ML
4 INJECTION INTRAMUSCULAR; INTRAVENOUS ONCE
Start: 2023-04-25 | End: 2023-04-25

## 2023-04-25 RX ORDER — HEPARIN SODIUM (PORCINE) LOCK FLUSH IV SOLN 100 UNIT/ML 100 UNIT/ML
500 SOLUTION INTRAVENOUS PRN
OUTPATIENT
Start: 2023-04-25

## 2023-04-25 RX ORDER — ONDANSETRON 2 MG/ML
8 INJECTION INTRAMUSCULAR; INTRAVENOUS
OUTPATIENT
Start: 2023-04-25

## 2023-04-25 RX ORDER — SODIUM CHLORIDE 9 MG/ML
5-250 INJECTION, SOLUTION INTRAVENOUS PRN
Status: DISCONTINUED | OUTPATIENT
Start: 2023-04-25 | End: 2023-04-26 | Stop reason: HOSPADM

## 2023-04-25 RX ADMIN — SODIUM CHLORIDE 25 ML/HR: 9 INJECTION, SOLUTION INTRAVENOUS at 14:07

## 2023-04-25 RX ADMIN — IRON SUCROSE 300 MG: 20 INJECTION, SOLUTION INTRAVENOUS at 14:19

## 2023-04-25 RX ADMIN — ONDANSETRON 4 MG: 2 INJECTION INTRAMUSCULAR; INTRAVENOUS at 14:20

## 2023-04-25 NOTE — PROGRESS NOTES
Patient assessed for the following post venofer    Dizziness   No  Lightheadedness  No      Acute nausea/vomiting No  Headache   No  Chest pain/pressure  No  Rash/itching   No  Shortness of breath  No    Patient kept for 20 minutes observation post infusion   Patient tolerated  treatment venofer without any complications. Last vital signs:   BP (!) 143/77   Pulse 65   Temp 98 °F (36.7 °C) (Oral)   Resp 16   Ht 5' 10\" (1.778 m)   Wt 280 lb (127 kg)   SpO2 98%   BMI 40.18 kg/m²         Patient instructed if experience any of the above symptoms following today's infusion,he/she is to notify MD immediately or go to the emergency department. Discharge instructions given to patient. Verbalizes understanding. Ambulated off unit per self with belongings.

## 2023-04-25 NOTE — PLAN OF CARE
Problem: Musculor/Skeletal Functional Status  Goal: Absence of falls  Outcome: Progressing  Note: No falls occurred with visit today. Problem: Intellectual/Education/Knowledge Deficit  Goal: Teaching initiated upon admission  Outcome: Progressing  Note: Patient verbalizes understanding to verbal information given on venofer,action and possible side effects. Aware to call MD if develop complications. Problem: Discharge Planning  Goal: Knowledge of discharge instructions  Description: Knowledge of discharge instructions  Outcome: Progressing  Note: Verbalize understanding of discharge instructions, follow up appointments, and when to call Physician. Care plan reviewed with patient. Patient verbalizes understanding of the plan of care and contributes to goal setting.

## 2023-07-10 ENCOUNTER — OFFICE VISIT (OUTPATIENT)
Dept: ONCOLOGY | Age: 53
End: 2023-07-10
Payer: COMMERCIAL

## 2023-07-10 ENCOUNTER — HOSPITAL ENCOUNTER (OUTPATIENT)
Dept: INFUSION THERAPY | Age: 53
Discharge: HOME | End: 2023-07-10
Payer: COMMERCIAL

## 2023-07-10 VITALS
DIASTOLIC BLOOD PRESSURE: 82 MMHG | HEIGHT: 70 IN | HEART RATE: 73 BPM | TEMPERATURE: 98.3 F | SYSTOLIC BLOOD PRESSURE: 140 MMHG | BODY MASS INDEX: 39 KG/M2 | OXYGEN SATURATION: 98 % | WEIGHT: 272.4 LBS

## 2023-07-10 VITALS
SYSTOLIC BLOOD PRESSURE: 140 MMHG | DIASTOLIC BLOOD PRESSURE: 82 MMHG | TEMPERATURE: 98.3 F | HEART RATE: 73 BPM | OXYGEN SATURATION: 98 %

## 2023-07-10 DIAGNOSIS — Z98.84 HISTORY OF GASTRIC BYPASS: ICD-10-CM

## 2023-07-10 DIAGNOSIS — E61.1 IRON DEFICIENCY: ICD-10-CM

## 2023-07-10 DIAGNOSIS — E61.1 IRON DEFICIENCY: Primary | ICD-10-CM

## 2023-07-10 DIAGNOSIS — R53.83 OTHER FATIGUE: ICD-10-CM

## 2023-07-10 LAB
ABSOLUTE IMMATURE GRANULOCYTE: 0.02 THOU/MM3 (ref 0–0.07)
BASOPHILS ABSOLUTE: 0 THOU/MM3 (ref 0–0.1)
BASOPHILS NFR BLD AUTO: 0 % (ref 0–3)
EOSINOPHIL NFR BLD AUTO: 2 % (ref 0–4)
EOSINOPHILS ABSOLUTE: 0.1 THOU/MM3 (ref 0–0.4)
ERYTHROCYTE [DISTWIDTH] IN BLOOD BY AUTOMATED COUNT: 13 % (ref 11.5–14.5)
FERRITIN SERPL IA-MCNC: 159 NG/ML (ref 10–291)
FOLATE SERPL-MCNC: > 20 NG/ML (ref 4.8–24.2)
HCT VFR BLD AUTO: 38.5 % (ref 37–47)
HGB BLD-MCNC: 12.9 GM/DL (ref 12–16)
IMMATURE GRANULOCYTES: 0 %
IRON SERPL-MCNC: 88 UG/DL (ref 50–170)
LYMPHOCYTES ABSOLUTE: 0.8 THOU/MM3 (ref 1–4.8)
LYMPHOCYTES NFR BLD AUTO: 16 % (ref 15–47)
MCH RBC QN AUTO: 28.8 PG (ref 26–33)
MCHC RBC AUTO-ENTMCNC: 33.5 GM/DL (ref 32.2–35.5)
MCV RBC AUTO: 86 FL (ref 81–99)
MONOCYTES ABSOLUTE: 0.5 THOU/MM3 (ref 0.4–1.3)
MONOCYTES NFR BLD AUTO: 10 % (ref 0–12)
NEUTROPHILS NFR BLD AUTO: 71 % (ref 43–75)
PLATELET # BLD AUTO: 293 THOU/MM3 (ref 130–400)
PMV BLD AUTO: 9.4 FL (ref 9.4–12.4)
RBC # BLD AUTO: 4.48 MILL/MM3 (ref 4.2–5.4)
SEGMENTED NEUTROPHILS ABSOLUTE COUNT: 3.3 THOU/MM3 (ref 1.8–7.7)
TIBC SERPL-MCNC: 313 UG/DL (ref 171–450)
VIT B12 SERPL-MCNC: 360 PG/ML (ref 211–911)
WBC # BLD AUTO: 4.7 THOU/MM3 (ref 4.8–10.8)

## 2023-07-10 PROCEDURE — 83540 ASSAY OF IRON: CPT

## 2023-07-10 PROCEDURE — 83550 IRON BINDING TEST: CPT

## 2023-07-10 PROCEDURE — 99213 OFFICE O/P EST LOW 20 MIN: CPT | Performed by: PHYSICIAN ASSISTANT

## 2023-07-10 PROCEDURE — 82607 VITAMIN B-12: CPT

## 2023-07-10 PROCEDURE — 85025 COMPLETE CBC W/AUTO DIFF WBC: CPT

## 2023-07-10 PROCEDURE — 99211 OFF/OP EST MAY X REQ PHY/QHP: CPT

## 2023-07-10 PROCEDURE — 82728 ASSAY OF FERRITIN: CPT

## 2023-07-10 PROCEDURE — 82746 ASSAY OF FOLIC ACID SERUM: CPT

## 2023-07-10 PROCEDURE — 3079F DIAST BP 80-89 MM HG: CPT | Performed by: PHYSICIAN ASSISTANT

## 2023-07-10 PROCEDURE — 3077F SYST BP >= 140 MM HG: CPT | Performed by: PHYSICIAN ASSISTANT

## 2023-07-10 PROCEDURE — 36415 COLL VENOUS BLD VENIPUNCTURE: CPT

## 2023-07-10 NOTE — PROGRESS NOTES
[facility] [address] [place] [address] Gautam Jackson 101 200  854 [facility] [address] [id number]  Dept: [phone]  Dept Fax: [phone]: [phone]      Visit Date:7/10/2023     Manasa Ugarte is a 48 y.o. female who presents today for:   Chief Complaint   Patient presents with    Follow-up     Iron deficiency        HPI:   Manasa Ugarte is a 48 y.o. female previously followed by Dr. Jose Ghotra due to anemia. Her anemia was related to iron deficiency from poor absorption. She has history of gastric bypass surgery. She reported increased fatigue, generalized weakness. She intermittently requires IV iron therapy. She last required IV iron in April 2023. Interval History 7/10/2023:   The patient is here for follow-up evaluation. Since her last appointment in April 2023 she received IV Venofer x3. She states she has been feeling better. Overall she has had improvement in fatigue. She reports she was gardening in the sun a week ago and had fatigue, palpitations, leg cramps which improved the next day with increased hydration. She denies lightheadedness, dizziness, palpitations, pica symptoms. She denies any abnormal bleeding. She approximates her last colonoscopy 3 years ago per Dr. Silvia Barragan. She was instructed to contact when she is due for follow up. Her last menses was 6 months ago; she stopped OCP which  she was on for menorrhagia. She continues interferon B injections; last one given on 7/9/23. PMH, SH, and FH:  I reviewed the patients medication list and allergy list as noted on the electronic medical record. The PMH, SH and FH were also reviewed as noted on the EMR. 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 Review of Systems:   Review of Systems   Pertinent review of systems noted in HPI, all other ROS negative.    Objective:   Physical Exam   BP (!) 140/82 (Site: Right Upper Arm, Position: Sitting, Cuff Size: Medium Adult)

## 2023-10-10 ENCOUNTER — OFFICE VISIT (OUTPATIENT)
Dept: ONCOLOGY | Age: 53
End: 2023-10-10
Payer: COMMERCIAL

## 2023-10-10 ENCOUNTER — HOSPITAL ENCOUNTER (OUTPATIENT)
Dept: INFUSION THERAPY | Age: 53
Discharge: HOME | End: 2023-10-10
Payer: COMMERCIAL

## 2023-10-10 VITALS
SYSTOLIC BLOOD PRESSURE: 124 MMHG | OXYGEN SATURATION: 99 % | TEMPERATURE: 98 F | RESPIRATION RATE: 16 BRPM | DIASTOLIC BLOOD PRESSURE: 73 MMHG | HEART RATE: 67 BPM

## 2023-10-10 VITALS
OXYGEN SATURATION: 99 % | SYSTOLIC BLOOD PRESSURE: 124 MMHG | HEART RATE: 67 BPM | DIASTOLIC BLOOD PRESSURE: 73 MMHG | TEMPERATURE: 98 F | HEIGHT: 70 IN | BODY MASS INDEX: 38.94 KG/M2 | RESPIRATION RATE: 16 BRPM | WEIGHT: 272 LBS

## 2023-10-10 DIAGNOSIS — Z98.84 HISTORY OF GASTRIC BYPASS: ICD-10-CM

## 2023-10-10 DIAGNOSIS — R53.83 OTHER FATIGUE: ICD-10-CM

## 2023-10-10 DIAGNOSIS — E61.1 IRON DEFICIENCY: ICD-10-CM

## 2023-10-10 DIAGNOSIS — E61.1 IRON DEFICIENCY: Primary | ICD-10-CM

## 2023-10-10 LAB
ABSOLUTE IMMATURE GRANULOCYTE: 0.02 THOU/MM3 (ref 0–0.07)
BASOPHILS ABSOLUTE: 0 THOU/MM3 (ref 0–0.1)
BASOPHILS NFR BLD AUTO: 1 % (ref 0–3)
EOSINOPHIL NFR BLD AUTO: 3 % (ref 0–4)
EOSINOPHILS ABSOLUTE: 0.1 THOU/MM3 (ref 0–0.4)
ERYTHROCYTE [DISTWIDTH] IN BLOOD BY AUTOMATED COUNT: 12.7 % (ref 11.5–14.5)
FERRITIN SERPL IA-MCNC: 103 NG/ML (ref 10–291)
HCT VFR BLD AUTO: 40.9 % (ref 37–47)
HGB BLD-MCNC: 13.8 GM/DL (ref 12–16)
IMMATURE GRANULOCYTES: 1 %
IRON SERPL-MCNC: 63 UG/DL (ref 50–170)
LYMPHOCYTES ABSOLUTE: 0.9 THOU/MM3 (ref 1–4.8)
LYMPHOCYTES NFR BLD AUTO: 24 % (ref 15–47)
MCH RBC QN AUTO: 28.8 PG (ref 26–33)
MCHC RBC AUTO-ENTMCNC: 33.7 GM/DL (ref 32.2–35.5)
MCV RBC AUTO: 85 FL (ref 81–99)
MONOCYTES ABSOLUTE: 0.6 THOU/MM3 (ref 0.4–1.3)
MONOCYTES NFR BLD AUTO: 15 % (ref 0–12)
NEUTROPHILS NFR BLD AUTO: 58 % (ref 43–75)
PLATELET # BLD AUTO: 297 THOU/MM3 (ref 130–400)
PMV BLD AUTO: 9.4 FL (ref 9.4–12.4)
RBC # BLD AUTO: 4.79 MILL/MM3 (ref 4.2–5.4)
SEGMENTED NEUTROPHILS ABSOLUTE COUNT: 2.2 THOU/MM3 (ref 1.8–7.7)
TIBC SERPL-MCNC: 343 UG/DL (ref 171–450)
WBC # BLD AUTO: 3.9 THOU/MM3 (ref 4.8–10.8)

## 2023-10-10 PROCEDURE — 99211 OFF/OP EST MAY X REQ PHY/QHP: CPT

## 2023-10-10 PROCEDURE — 3074F SYST BP LT 130 MM HG: CPT | Performed by: PHYSICIAN ASSISTANT

## 2023-10-10 PROCEDURE — 3078F DIAST BP <80 MM HG: CPT | Performed by: PHYSICIAN ASSISTANT

## 2023-10-10 PROCEDURE — 85025 COMPLETE CBC W/AUTO DIFF WBC: CPT

## 2023-10-10 PROCEDURE — 83550 IRON BINDING TEST: CPT

## 2023-10-10 PROCEDURE — 82728 ASSAY OF FERRITIN: CPT

## 2023-10-10 PROCEDURE — 99213 OFFICE O/P EST LOW 20 MIN: CPT | Performed by: PHYSICIAN ASSISTANT

## 2023-10-10 PROCEDURE — 36415 COLL VENOUS BLD VENIPUNCTURE: CPT

## 2023-10-10 PROCEDURE — 83540 ASSAY OF IRON: CPT

## 2023-10-10 NOTE — PATIENT INSTRUCTIONS
Return to clinic in 3 months  Labs on RTC: CBC, ferritin, iron, TIBC   Please call for questions or concerns.

## 2023-11-01 ENCOUNTER — HOSPITAL ENCOUNTER (OUTPATIENT)
Dept: WOMENS IMAGING | Age: 53
Discharge: HOME | End: 2023-11-01
Payer: COMMERCIAL

## 2023-11-01 VITALS — HEIGHT: 70 IN | BODY MASS INDEX: 37.51 KG/M2 | WEIGHT: 262 LBS

## 2023-11-01 DIAGNOSIS — Z12.31 VISIT FOR SCREENING MAMMOGRAM: ICD-10-CM

## 2023-11-01 PROCEDURE — 77063 BREAST TOMOSYNTHESIS BI: CPT

## 2023-12-29 DIAGNOSIS — Z98.84 HISTORY OF GASTRIC BYPASS: ICD-10-CM

## 2023-12-29 DIAGNOSIS — E61.1 IRON DEFICIENCY: ICD-10-CM

## 2023-12-29 DIAGNOSIS — R53.83 OTHER FATIGUE: ICD-10-CM

## 2023-12-29 LAB
BASOPHILS ABSOLUTE: 0 THOU/MM3 (ref 0–0.1)
BASOPHILS NFR BLD AUTO: 0.6 %
DEPRECATED RDW RBC AUTO: 41.6 FL (ref 35–45)
EOSINOPHIL NFR BLD AUTO: 2.6 %
EOSINOPHILS ABSOLUTE: 0.1 THOU/MM3 (ref 0–0.4)
ERYTHROCYTE [DISTWIDTH] IN BLOOD BY AUTOMATED COUNT: 13.2 % (ref 11.5–14.5)
FERRITIN SERPL IA-MCNC: 27 NG/ML (ref 10–291)
FOLATE SERPL-MCNC: > 20 NG/ML (ref 4.8–24.2)
HCT VFR BLD AUTO: 40 % (ref 37–47)
HGB BLD-MCNC: 12.9 GM/DL (ref 12–16)
IMM GRANULOCYTES # BLD AUTO: 0.01 THOU/MM3 (ref 0–0.07)
IMM GRANULOCYTES NFR BLD AUTO: 0.2 %
IRON SERPL-MCNC: 59 UG/DL (ref 50–170)
LYMPHOCYTES ABSOLUTE: 1.1 THOU/MM3 (ref 1–4.8)
LYMPHOCYTES NFR BLD AUTO: 24.7 %
MCH RBC QN AUTO: 28.1 PG (ref 26–33)
MCHC RBC AUTO-ENTMCNC: 32.3 GM/DL (ref 32.2–35.5)
MCV RBC AUTO: 87.1 FL (ref 81–99)
MONOCYTES ABSOLUTE: 0.4 THOU/MM3 (ref 0.4–1.3)
MONOCYTES NFR BLD AUTO: 9.7 %
NEUTROPHILS NFR BLD AUTO: 62.2 %
NRBC BLD AUTO-RTO: 0 /100 WBC
PLATELET # BLD AUTO: 301 THOU/MM3 (ref 130–400)
PMV BLD AUTO: 9.9 FL (ref 9.4–12.4)
RBC # BLD AUTO: 4.59 MILL/MM3 (ref 4.2–5.4)
SEGMENTED NEUTROPHILS ABSOLUTE COUNT: 2.9 THOU/MM3 (ref 1.8–7.7)
TIBC SERPL-MCNC: 381 UG/DL (ref 171–450)
VIT B12 SERPL-MCNC: 501 PG/ML (ref 211–911)
WBC # BLD AUTO: 4.6 THOU/MM3 (ref 4.8–10.8)

## 2024-01-04 ENCOUNTER — HOSPITAL ENCOUNTER (OUTPATIENT)
Dept: PHYSICAL THERAPY | Age: 54
Setting detail: THERAPIES SERIES
Discharge: HOME | End: 2024-01-04
Payer: COMMERCIAL

## 2024-01-04 PROCEDURE — 97161 PT EVAL LOW COMPLEX 20 MIN: CPT

## 2024-01-04 PROCEDURE — 97750 PHYSICAL PERFORMANCE TEST: CPT

## 2024-01-04 NOTE — PROGRESS NOTES
Comments   Eyes open                                    1 Sec        2-3 Sec No UE support        FUNCTIONAL TESTS    Pain No Pain Comments   Stair navigation x  Patient able to negotiate 12 steps with B HR, will use reciprocal and non reciprocal gait depending on knee pain and stability, decreased eccentric control noted    Squat x  L knee pain increased, decreased weight shift L, decreased squat depth, knees go past toes, has to use 1 UE support for balance and stability, L knee did almost give out on last rep. L LE tends to ER     Sit to Stand x  Mod I sit to stand, B UE support     Floor transfer x  Able to get on and off floor with B UE support, half knee transfer        TREATMENT   Precautions: MS    Pain:     \"X” in shaded column indicates activity completed today    “*\" next to exercise/intervention indicates progression   Modalities Parameters/  Location  Notes                     Manual Therapy Time/Technique  Notes                     Exercise/Intervention   Notes                                                                         PT educated patient on correct alignment with hinged knee brace and how to tremaine brace and how to prevent skin breakdown if using brace on direct skin and to perform skin checks in areas she can not feel to ensure no skin breakdown   x      Specific Interventions Next Treatment: nu step, stretching L IT band/hip flexor/quad, L knee and B hip strengthening, quad set with ER, SLR with ER, LAQ with ER, gait training, step ups, balance tasks, may benefit from tape for lateral patellar tracking, ice as needed.     Activity/Treatment Tolerance:  [x]  Patient tolerated treatment well  []  Patient limited by fatigue  []  Patient limited by pain   []  Patient limited by medical complications  []  Other:     Assessment: 53 year old female presents with L knee OA. Patient has pain in L knee, decreased LE strength, gait deviations, impaired balance, edema and tightness in IT band/hip

## 2024-01-04 NOTE — DISCHARGE SUMMARY
diagnosed with MS in 2002. Patient does feel that over the past 2 years she has noticed a significant decline in her strength and balance and gait. Patient reports that she has had 4 falls in the past 12 months, near falls about once a month. Patient does feel that her balance is impaired. Patient uses a walking stick or a cane for mobility. Patient does have neuropathy in B LE from feet to her knees. Patient does have a walker available but has not needed it yet. Patient has pain in her spine, muscle spasms, and L knee. Patient has had R knee scope for menisectomy. Patient also had L knee scope that did not heal well. Patient reports that she does get migraines that come with her injection medication. 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      Social/Functional History and Current Status:  Medications and Allergies have been reviewed and are listed on Medical History Questionnaire.    Precautions: none   Patient weight: 262#  Patient height: 5'10\"  Last date worked: 8/28/2020  Employer: [employer]   Punctuality: on time for assessment   Appearance: dressed appropriately for assessment  Marital status:   Children: 2 adult children   Family Dynamics: lives with spouse   Support system: Spouse/Significant Other, Parent/Parents, Children, Extended Family, Neighbors, and [place]  Education: Master's Degree Nursing and ENT specialist and RN [employer]   Work history: [employer] 7 years, [employer] 5 years, [employer] 4 years, [employer] 2 years, [employer] 10 years       Karyna Branch lives with spouse     Objective:    Pain L knee 4/10 pain  Fatigue level 0/10 at start    Sensation Decreased light touch R hand, decreased light touch B

## 2024-01-05 ENCOUNTER — HOSPITAL ENCOUNTER (OUTPATIENT)
Dept: MRI IMAGING | Age: 54
Discharge: HOME | End: 2024-01-05
Payer: COMMERCIAL

## 2024-01-05 DIAGNOSIS — G35 MULTIPLE SCLEROSIS (HCC): ICD-10-CM

## 2024-01-05 PROCEDURE — 70553 MRI BRAIN STEM W/O & W/DYE: CPT

## 2024-01-05 PROCEDURE — A9579 GAD-BASE MR CONTRAST NOS,1ML: HCPCS | Performed by: NURSE PRACTITIONER

## 2024-01-05 PROCEDURE — 6360000004 HC RX CONTRAST MEDICATION: Performed by: NURSE PRACTITIONER

## 2024-01-05 RX ADMIN — GADOTERIDOL 20 ML: 279.3 INJECTION, SOLUTION INTRAVENOUS at 18:30

## 2024-01-09 ENCOUNTER — HOSPITAL ENCOUNTER (OUTPATIENT)
Dept: PHYSICAL THERAPY | Age: 54
Setting detail: THERAPIES SERIES
Discharge: HOME | End: 2024-01-09
Payer: COMMERCIAL

## 2024-01-09 PROCEDURE — 97110 THERAPEUTIC EXERCISES: CPT

## 2024-01-09 NOTE — PROGRESS NOTES
from 29/80 to 39/80 to allow improved functional mobility and ease of recreational activities.    Patient Education:   [x]  HEP/Education Completed: HEP with handout and theraband, Kinesiotaping care and monitoring response  [employer] Access Code:  [email or web address]  []  No new Education completed  []  Reviewed Prior HEP      [x]  Patient verbalized and/or demonstrated understanding of education provided.  []  Patient unable to verbalize and/or demonstrate understanding of education provided.  Will continue education.  []  Barriers to learning:     PLAN:  Treatment Recommendations: Strengthening, Balance Training, Endurance Training, Gait Training, Stair Training, Neuromuscular Re-education, Manual Therapy - Soft Tissue Mobilization, Manual Therapy - Joint Manipulation, Pain Management, Home Exercise Program, Patient Education, Integrative Dry Needling, Aquatics, and Modalities    []  Plan of care initiated.  Plan to see patient 2 times per week for 8 weeks to address the treatment planned outlined above.  [x]  Continue with current plan of care  []  Modify plan of care as follows:    []  Hold pending physician visit  []  Discharge    Time In 1330   Time Out 1415   Total Units Billed 3/48   Timed Code Minutes: 45 min   Total Treatment Time: 45 min     Electronically Signed by: Melita Unger PTA

## 2024-01-12 ENCOUNTER — OFFICE VISIT (OUTPATIENT)
Dept: ONCOLOGY | Age: 54
End: 2024-01-12
Payer: COMMERCIAL

## 2024-01-12 ENCOUNTER — HOSPITAL ENCOUNTER (OUTPATIENT)
Dept: INFUSION THERAPY | Age: 54
Discharge: HOME | End: 2024-01-12
Payer: COMMERCIAL

## 2024-01-12 VITALS
OXYGEN SATURATION: 98 % | TEMPERATURE: 97.4 F | HEIGHT: 70 IN | BODY MASS INDEX: 39.65 KG/M2 | WEIGHT: 277 LBS | DIASTOLIC BLOOD PRESSURE: 73 MMHG | HEART RATE: 74 BPM | RESPIRATION RATE: 20 BRPM | SYSTOLIC BLOOD PRESSURE: 145 MMHG

## 2024-01-12 VITALS
OXYGEN SATURATION: 98 % | BODY MASS INDEX: 39.65 KG/M2 | DIASTOLIC BLOOD PRESSURE: 73 MMHG | TEMPERATURE: 97.4 F | SYSTOLIC BLOOD PRESSURE: 145 MMHG | HEIGHT: 70 IN | WEIGHT: 277 LBS | RESPIRATION RATE: 20 BRPM | HEART RATE: 74 BPM

## 2024-01-12 DIAGNOSIS — R53.83 OTHER FATIGUE: ICD-10-CM

## 2024-01-12 DIAGNOSIS — E61.1 IRON DEFICIENCY: Primary | ICD-10-CM

## 2024-01-12 DIAGNOSIS — Z98.84 HISTORY OF GASTRIC BYPASS: ICD-10-CM

## 2024-01-12 PROCEDURE — 3077F SYST BP >= 140 MM HG: CPT | Performed by: PHYSICIAN ASSISTANT

## 2024-01-12 PROCEDURE — 99214 OFFICE O/P EST MOD 30 MIN: CPT | Performed by: PHYSICIAN ASSISTANT

## 2024-01-12 PROCEDURE — 99211 OFF/OP EST MAY X REQ PHY/QHP: CPT

## 2024-01-12 PROCEDURE — 3078F DIAST BP <80 MM HG: CPT | Performed by: PHYSICIAN ASSISTANT

## 2024-01-12 NOTE — PATIENT INSTRUCTIONS
Proceed with IV Venofer, total of 3 infusions to be given at least one week apart.  Return to clinic in 12 weeks.  Labs on RTC: CBC, ferritin  Please call for questions or concerns.

## 2024-01-12 NOTE — PROGRESS NOTES
[facility] [address], [address]  [facility] [address]  Dept: [phone]  Dept Fax: [phone] Loc: [phone]      Visit Date:1/12/2024     Karyna Branch is a 53 y.o. female who presents today for:   Chief Complaint   Patient presents with    Follow-up     Iron deficiency            HPI:   Karyna Branch is a 53 y.o. female followed for history of anemia. She previously followed with Dr. Escobar in the office. She was found to have anemia related to iron deficiency from poor absorption. The patient has history of gastric bypass surgery. She has intermittently required IV iron therapy.   She last required IV iron in April 2023.   -At July 2023 appointment reported last colonoscopy approximately 3 years ago. She follows with Dr. Kerns. She is on interferon B injections for MS. She has been instructed to follow up with Dr. Ramey for colonoscopy.       Interval History 1/12/2024:   The patient is here for follow-up evaluation. She was last seen in clinic in October 2023. She reports she has been feeling increased fatigue.  She denies lightheadedness, dizziness, chest pain, shortness of breath or palpitations.  She denies any abnormal bleeding.  She continues to follow with neurology and receive interferon B injections for MS.        PMH, SH, and FH:  I reviewed the patients medication list and allergy list as noted on the electronic medical record. The PMH, SH and FH were also reviewed as noted on the EMR.    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     Review of Systems:   Review of Systems   Pertinent review of systems noted in HPI, all other ROS negative.   Objective:   Physical Exam   BP (!) 145/73 (Site: Right Upper Arm, Position: Sitting, Cuff Size: Medium Adult)   Pulse 74   Temp 97.4 °F (36.3 °C)   Resp 20   Ht 1.778 m (5' 10\")   Wt 125.6 kg (277 lb)   SpO2 98%   BMI 39.75 kg/m²    General

## 2024-01-16 ENCOUNTER — HOSPITAL ENCOUNTER (OUTPATIENT)
Dept: PHYSICAL THERAPY | Age: 54
Setting detail: THERAPIES SERIES
Discharge: HOME | End: 2024-01-16
Payer: COMMERCIAL

## 2024-01-16 PROCEDURE — 97110 THERAPEUTIC EXERCISES: CPT

## 2024-01-16 NOTE — PROGRESS NOTES
[facility]  PHYSICAL THERAPY  [] EVALUATION  [x] DAILY NOTE (LAND) [] DAILY NOTE (AQUATIC ) [] PROGRESS NOTE [] DISCHARGE NOTE    [x] OUTPATIENT REHABILITATION CENTER [facility]   [] [place] [facility]    [] [facility]   [] [facility]    Date: 2024  Patient Name:  Karyna Branch  : 1970  MRN: [id number]  CSN: [id number]    Referring Practitioner Franklin Coon MD    Diagnosis Osteoarthritis of left knee    Treatment Diagnosis M25.562  Left Knee Pain  M25.362 Other instability, left knee  R53.1 Weakness  R26.2 Difficulty in walking   Date of Evaluation 24    Additional Pertinent History HTN, asthma, incontinence, obesity, MS, B knee scope, L foot surgery, neuropathy, R shoulder RCR surgery x 2       Functional Outcome Measure Used LEFS   Functional Outcome Score 29/80 (24)       Insurance: Primary: Payor: [facility] /  /  / ,   Secondary:    Authorization Information: PRE CERTIFICATION REQUIRED: YES, AFTER SULTANA JETT [facility]  INSURANCE THERAPY BENEFIT:  20 VISITS CALENDAR YEAR -HARDMAX -NONE USED  AQUATIC THERAPY COVERED: YES  MODALITIES COVERED:  YES  Received auth for 48 PT units for CPT codes 88987, 94472, 00664, 26645, 86411, 58778 and 74532 FROM 24 through 24.  CPT code 15359 is pending.      Approved Procedure Codes: 77256 - Therapeutic Exercise  , 58545 - Manual Therapy , 55517 - Aquatic Therapeutic Exercise, 56836 - Neuromuscular Re-Education , 07763 - Gait Training, 26828 - Therapeutic Activities, 08821 - Ultrasound , and 39048 - Manual Electrical Stimulation  (Codes requested indicated by red font, codes approved indicated by black font)   Visit # 3, 3/10 for progress note   Visits Allowed: 48 units   Recertification Date: 3/1/24   Physician Follow-Up:    Physician Orders:    History of Present Illness: Patient reports that she has been having L knee pain for several years now. Patient had L knee scope in  due to a

## 2024-01-18 PROBLEM — Z98.84 HISTORY OF GASTRIC BYPASS: Status: ACTIVE | Noted: 2024-01-18

## 2024-01-18 RX ORDER — ALBUTEROL SULFATE 90 UG/1
4 AEROSOL, METERED RESPIRATORY (INHALATION) PRN
OUTPATIENT
Start: 2024-01-18

## 2024-01-18 RX ORDER — SODIUM CHLORIDE 0.9 % (FLUSH) 0.9 %
5-40 SYRINGE (ML) INJECTION PRN
OUTPATIENT
Start: 2024-01-18

## 2024-01-18 RX ORDER — HEPARIN SODIUM (PORCINE) LOCK FLUSH IV SOLN 100 UNIT/ML 100 UNIT/ML
500 SOLUTION INTRAVENOUS PRN
OUTPATIENT
Start: 2024-01-18

## 2024-01-18 RX ORDER — FAMOTIDINE 10 MG/ML
20 INJECTION, SOLUTION INTRAVENOUS
OUTPATIENT
Start: 2024-01-18

## 2024-01-18 RX ORDER — DIPHENHYDRAMINE HYDROCHLORIDE 50 MG/ML
50 INJECTION INTRAMUSCULAR; INTRAVENOUS
OUTPATIENT
Start: 2024-01-18

## 2024-01-18 RX ORDER — SODIUM CHLORIDE 9 MG/ML
INJECTION, SOLUTION INTRAVENOUS CONTINUOUS
OUTPATIENT
Start: 2024-01-18

## 2024-01-18 RX ORDER — ONDANSETRON 2 MG/ML
8 INJECTION INTRAMUSCULAR; INTRAVENOUS
OUTPATIENT
Start: 2024-01-18

## 2024-01-18 RX ORDER — EPINEPHRINE 1 MG/ML
0.3 INJECTION, SOLUTION, CONCENTRATE INTRAVENOUS PRN
OUTPATIENT
Start: 2024-01-18

## 2024-01-18 RX ORDER — SODIUM CHLORIDE 9 MG/ML
5-250 INJECTION, SOLUTION INTRAVENOUS PRN
OUTPATIENT
Start: 2024-01-18

## 2024-01-18 RX ORDER — ONDANSETRON 2 MG/ML
4 INJECTION INTRAMUSCULAR; INTRAVENOUS ONCE
Start: 2024-01-18 | End: 2024-01-18

## 2024-01-18 RX ORDER — ACETAMINOPHEN 325 MG/1
650 TABLET ORAL
OUTPATIENT
Start: 2024-01-18

## 2024-01-22 ENCOUNTER — HOSPITAL ENCOUNTER (OUTPATIENT)
Dept: INFUSION THERAPY | Age: 54
Discharge: HOME | End: 2024-01-22
Payer: COMMERCIAL

## 2024-01-22 VITALS
DIASTOLIC BLOOD PRESSURE: 68 MMHG | HEART RATE: 62 BPM | TEMPERATURE: 98.3 F | WEIGHT: 280.8 LBS | OXYGEN SATURATION: 98 % | RESPIRATION RATE: 18 BRPM | SYSTOLIC BLOOD PRESSURE: 131 MMHG | BODY MASS INDEX: 40.2 KG/M2 | HEIGHT: 70 IN

## 2024-01-22 DIAGNOSIS — E61.1 IRON DEFICIENCY: ICD-10-CM

## 2024-01-22 DIAGNOSIS — K90.9 MALABSORPTION OF IRON: ICD-10-CM

## 2024-01-22 DIAGNOSIS — D50.9 IRON DEFICIENCY ANEMIA, UNSPECIFIED IRON DEFICIENCY ANEMIA TYPE: ICD-10-CM

## 2024-01-22 DIAGNOSIS — Z98.84 HISTORY OF GASTRIC BYPASS: ICD-10-CM

## 2024-01-22 DIAGNOSIS — E61.1 IRON DEFICIENCY: Primary | ICD-10-CM

## 2024-01-22 PROCEDURE — 2580000003 HC RX 258: Performed by: PHYSICIAN ASSISTANT

## 2024-01-22 PROCEDURE — 96365 THER/PROPH/DIAG IV INF INIT: CPT

## 2024-01-22 PROCEDURE — 96366 THER/PROPH/DIAG IV INF ADDON: CPT

## 2024-01-22 PROCEDURE — 6360000002 HC RX W HCPCS: Performed by: PHYSICIAN ASSISTANT

## 2024-01-22 PROCEDURE — 96375 TX/PRO/DX INJ NEW DRUG ADDON: CPT

## 2024-01-22 RX ORDER — ONDANSETRON 2 MG/ML
4 INJECTION INTRAMUSCULAR; INTRAVENOUS ONCE
Status: CANCELLED
Start: 2024-01-29 | End: 2024-01-29

## 2024-01-22 RX ORDER — DIPHENHYDRAMINE HYDROCHLORIDE 50 MG/ML
50 INJECTION INTRAMUSCULAR; INTRAVENOUS
Status: CANCELLED | OUTPATIENT
Start: 2024-01-29

## 2024-01-22 RX ORDER — SODIUM CHLORIDE 9 MG/ML
INJECTION, SOLUTION INTRAVENOUS CONTINUOUS
Status: CANCELLED | OUTPATIENT
Start: 2024-01-29

## 2024-01-22 RX ORDER — EPINEPHRINE 1 MG/ML
0.3 INJECTION, SOLUTION INTRAMUSCULAR; SUBCUTANEOUS PRN
Status: CANCELLED | OUTPATIENT
Start: 2024-01-29

## 2024-01-22 RX ORDER — ONDANSETRON 2 MG/ML
4 INJECTION INTRAMUSCULAR; INTRAVENOUS ONCE
Status: COMPLETED | OUTPATIENT
Start: 2024-01-22 | End: 2024-01-22

## 2024-01-22 RX ORDER — ONDANSETRON 2 MG/ML
8 INJECTION INTRAMUSCULAR; INTRAVENOUS
Status: CANCELLED | OUTPATIENT
Start: 2024-01-29

## 2024-01-22 RX ORDER — ACETAMINOPHEN 325 MG/1
650 TABLET ORAL
Status: CANCELLED | OUTPATIENT
Start: 2024-01-29

## 2024-01-22 RX ORDER — HEPARIN 100 UNIT/ML
500 SYRINGE INTRAVENOUS PRN
Status: CANCELLED | OUTPATIENT
Start: 2024-01-29

## 2024-01-22 RX ORDER — ALBUTEROL SULFATE 90 UG/1
4 AEROSOL, METERED RESPIRATORY (INHALATION) PRN
Status: CANCELLED | OUTPATIENT
Start: 2024-01-29

## 2024-01-22 RX ORDER — SODIUM CHLORIDE 9 MG/ML
5-250 INJECTION, SOLUTION INTRAVENOUS PRN
Status: CANCELLED | OUTPATIENT
Start: 2024-01-29

## 2024-01-22 RX ORDER — SCOLOPAMINE TRANSDERMAL SYSTEM 1 MG/1
PATCH, EXTENDED RELEASE TRANSDERMAL
Qty: 4 PATCH | Refills: 2 | Status: SHIPPED | OUTPATIENT
Start: 2024-01-22

## 2024-01-22 RX ORDER — SODIUM CHLORIDE 0.9 % (FLUSH) 0.9 %
5-40 SYRINGE (ML) INJECTION PRN
Status: CANCELLED | OUTPATIENT
Start: 2024-01-29

## 2024-01-22 RX ORDER — SODIUM CHLORIDE 9 MG/ML
5-250 INJECTION, SOLUTION INTRAVENOUS PRN
Status: DISCONTINUED | OUTPATIENT
Start: 2024-01-22 | End: 2024-01-23 | Stop reason: HOSPADM

## 2024-01-22 RX ADMIN — SODIUM CHLORIDE 55 ML/HR: 9 INJECTION, SOLUTION INTRAVENOUS at 12:20

## 2024-01-22 RX ADMIN — SODIUM CHLORIDE 300 MG: 9 INJECTION, SOLUTION INTRAVENOUS at 12:48

## 2024-01-22 RX ADMIN — ONDANSETRON 4 MG: 2 INJECTION INTRAMUSCULAR; INTRAVENOUS at 12:19

## 2024-01-22 NOTE — PLAN OF CARE
Problem: Falls - Risk of:  Goal: Will remain free from falls  Description: Will remain free from falls  Outcome: Adequate for Discharge     Problem: Safety - Adult  Goal: Free from fall injury  Outcome: Adequate for Discharge  Flowsheets (Taken 1/22/2024 1310)  Free From Fall Injury: Instruct family/caregiver on patient safety     Problem: Discharge Planning  Goal: Discharge to home or other facility with appropriate resources  Outcome: Adequate for Discharge  Flowsheets (Taken 1/22/2024 1310)  Discharge to home or other facility with appropriate resources: Identify barriers to discharge with patient and caregiver     Problem: Chronic Conditions and Co-morbidities  Goal: Patient's chronic conditions and co-morbidity symptoms are monitored and maintained or improved  Outcome: Adequate for Discharge  Flowsheets (Taken 1/22/2024 1310)  Care Plan - Patient's Chronic Conditions and Co-Morbidity Symptoms are Monitored and Maintained or Improved:   Monitor and assess patient's chronic conditions and comorbid symptoms for stability, deterioration, or improvement   Collaborate with multidisciplinary team to address chronic and comorbid conditions and prevent exacerbation or deterioration  Note: Patient verbalizes understanding to verbal information given on Venofer,action and possible side effects. Aware to call MD if develop complications.      Care plan reviewed with patient.  Patient verbalize understanding of the plan of care and contribute to goal setting.

## 2024-01-22 NOTE — PROGRESS NOTES
Patient tolerated Venofer without any complications.  Denies dizziness, lightheadedness, acute nausea or vomiting, headache, heart palpitations, rash/itching or increased SOB.    Patient instructed if they experience any of the above symptoms following today's visit, he/she is to notify the Physician or go to the Emergency Dept.    Discharge instructions given to patient, Verbalizes understanding. Ambulated off unit per self in stable condition with all belongings.

## 2024-01-23 ENCOUNTER — APPOINTMENT (OUTPATIENT)
Dept: PHYSICAL THERAPY | Age: 54
End: 2024-01-23
Payer: COMMERCIAL

## 2024-01-26 ENCOUNTER — HOSPITAL ENCOUNTER (OUTPATIENT)
Dept: PHYSICAL THERAPY | Age: 54
Setting detail: THERAPIES SERIES
Discharge: HOME | End: 2024-01-26
Payer: COMMERCIAL

## 2024-01-26 PROCEDURE — 97110 THERAPEUTIC EXERCISES: CPT

## 2024-01-26 NOTE — DISCHARGE SUMMARY
[facility]  PHYSICAL THERAPY  [] EVALUATION  [] DAILY NOTE (LAND) [] DAILY NOTE (AQUATIC ) [] PROGRESS NOTE [x] DISCHARGE NOTE    [x] OUTPATIENT REHABILITATION CENTER - [place]   [] [place] [facility]    [] [facility]   [] [facility]    Date: 2024  Patient Name:  Karyna Branch  : 1970  MRN: [id number]  CSN: [id number]    Referring Practitioner Franklin Coon MD    Diagnosis Osteoarthritis of left knee    Treatment Diagnosis M25.562  Left Knee Pain  M25.362 Other instability, left knee  R53.1 Weakness  R26.2 Difficulty in walking   Date of Evaluation 24    Additional Pertinent History HTN, asthma, incontinence, obesity, MS, B knee scope, L foot surgery, neuropathy, R shoulder RCR surgery x 2       Functional Outcome Measure Used LEFS   Functional Outcome Score 29/80 (24) 30/80 (24)      Insurance: Primary: Payor: [facility] /  /  / ,   Secondary:    Authorization Information: PRE CERTIFICATION REQUIRED: YES, [facility] [place]  INSURANCE THERAPY BENEFIT:  20 VISITS CALENDAR YEAR -HARDMAX -NONE USED  AQUATIC THERAPY COVERED: YES  MODALITIES COVERED:  YES  Received auth for 48 PT units for CPT codes 69337, 94702, 65863, 99029, 36169, 22932 and 97260 FROM 24 through 24.  CPT code 35877 is pending.      Approved Procedure Codes: 93245 - Therapeutic Exercise  , 64838 - Manual Therapy , 80268 - Aquatic Therapeutic Exercise, 72092 - Neuromuscular Re-Education , 51162 - Gait Training, 15593 - Therapeutic Activities, 26691 - Ultrasound , and 56760 - Manual Electrical Stimulation  (Codes requested indicated by red font, codes approved indicated by black font)   Visit # 4, 0/10 for progress note   Visits Allowed: 48 units   Recertification Date: 3/1/24   Physician Follow-Up:    Physician Orders:    History of Present Illness: Patient reports that she has been having L knee pain for several years now. Patient had L knee scope

## 2024-01-30 ENCOUNTER — HOSPITAL ENCOUNTER (OUTPATIENT)
Dept: INFUSION THERAPY | Age: 54
Discharge: HOME | End: 2024-01-30
Payer: COMMERCIAL

## 2024-01-30 VITALS
TEMPERATURE: 98.4 F | HEART RATE: 70 BPM | RESPIRATION RATE: 18 BRPM | WEIGHT: 280 LBS | HEIGHT: 70 IN | OXYGEN SATURATION: 99 % | BODY MASS INDEX: 40.09 KG/M2 | DIASTOLIC BLOOD PRESSURE: 70 MMHG | SYSTOLIC BLOOD PRESSURE: 145 MMHG

## 2024-01-30 DIAGNOSIS — K90.9 MALABSORPTION OF IRON: ICD-10-CM

## 2024-01-30 DIAGNOSIS — D50.9 IRON DEFICIENCY ANEMIA, UNSPECIFIED IRON DEFICIENCY ANEMIA TYPE: ICD-10-CM

## 2024-01-30 DIAGNOSIS — E61.1 IRON DEFICIENCY: Primary | ICD-10-CM

## 2024-01-30 DIAGNOSIS — Z98.84 HISTORY OF GASTRIC BYPASS: ICD-10-CM

## 2024-01-30 PROCEDURE — 96375 TX/PRO/DX INJ NEW DRUG ADDON: CPT

## 2024-01-30 PROCEDURE — 96366 THER/PROPH/DIAG IV INF ADDON: CPT

## 2024-01-30 PROCEDURE — 2580000003 HC RX 258: Performed by: PHYSICIAN ASSISTANT

## 2024-01-30 PROCEDURE — 96365 THER/PROPH/DIAG IV INF INIT: CPT

## 2024-01-30 PROCEDURE — 6360000002 HC RX W HCPCS: Performed by: PHYSICIAN ASSISTANT

## 2024-01-30 RX ORDER — SODIUM CHLORIDE 0.9 % (FLUSH) 0.9 %
5-40 SYRINGE (ML) INJECTION PRN
OUTPATIENT
Start: 2024-02-05

## 2024-01-30 RX ORDER — DIPHENHYDRAMINE HYDROCHLORIDE 50 MG/ML
50 INJECTION INTRAMUSCULAR; INTRAVENOUS
OUTPATIENT
Start: 2024-02-05

## 2024-01-30 RX ORDER — ONDANSETRON 2 MG/ML
4 INJECTION INTRAMUSCULAR; INTRAVENOUS ONCE
Start: 2024-02-05 | End: 2024-02-05

## 2024-01-30 RX ORDER — SODIUM CHLORIDE 9 MG/ML
INJECTION, SOLUTION INTRAVENOUS CONTINUOUS
OUTPATIENT
Start: 2024-02-05

## 2024-01-30 RX ORDER — ACETAMINOPHEN 325 MG/1
650 TABLET ORAL
OUTPATIENT
Start: 2024-02-05

## 2024-01-30 RX ORDER — SODIUM CHLORIDE 9 MG/ML
5-250 INJECTION, SOLUTION INTRAVENOUS PRN
OUTPATIENT
Start: 2024-02-05

## 2024-01-30 RX ORDER — SODIUM CHLORIDE 9 MG/ML
5-250 INJECTION, SOLUTION INTRAVENOUS PRN
Status: DISCONTINUED | OUTPATIENT
Start: 2024-01-30 | End: 2024-01-31 | Stop reason: HOSPADM

## 2024-01-30 RX ORDER — ALBUTEROL SULFATE 90 UG/1
4 AEROSOL, METERED RESPIRATORY (INHALATION) PRN
OUTPATIENT
Start: 2024-02-05

## 2024-01-30 RX ORDER — ONDANSETRON 2 MG/ML
8 INJECTION INTRAMUSCULAR; INTRAVENOUS
OUTPATIENT
Start: 2024-02-05

## 2024-01-30 RX ORDER — ONDANSETRON 2 MG/ML
4 INJECTION INTRAMUSCULAR; INTRAVENOUS ONCE
Status: COMPLETED | OUTPATIENT
Start: 2024-01-30 | End: 2024-01-30

## 2024-01-30 RX ORDER — EPINEPHRINE 1 MG/ML
0.3 INJECTION, SOLUTION INTRAMUSCULAR; SUBCUTANEOUS PRN
OUTPATIENT
Start: 2024-02-05

## 2024-01-30 RX ORDER — HEPARIN 100 UNIT/ML
500 SYRINGE INTRAVENOUS PRN
OUTPATIENT
Start: 2024-02-05

## 2024-01-30 RX ADMIN — ONDANSETRON 4 MG: 2 INJECTION INTRAMUSCULAR; INTRAVENOUS at 12:46

## 2024-01-30 RX ADMIN — SODIUM CHLORIDE 250 ML/HR: 9 INJECTION, SOLUTION INTRAVENOUS at 12:46

## 2024-01-30 RX ADMIN — SODIUM CHLORIDE 300 MG: 9 INJECTION, SOLUTION INTRAVENOUS at 12:48

## 2024-01-30 NOTE — PLAN OF CARE
Problem: Safety - Adult  Goal: Free from fall injury  Outcome: Adequate for Discharge  Flowsheets (Taken 1/30/2024 1318)  Free From Fall Injury: Instruct family/caregiver on patient safety     Problem: Discharge Planning  Goal: Discharge to home or other facility with appropriate resources  Outcome: Adequate for Discharge  Flowsheets (Taken 1/30/2024 1318)  Discharge to home or other facility with appropriate resources: Identify barriers to discharge with patient and caregiver     Problem: Chronic Conditions and Co-morbidities  Goal: Patient's chronic conditions and co-morbidity symptoms are monitored and maintained or improved  Outcome: Adequate for Discharge  Flowsheets (Taken 1/30/2024 1318)  Care Plan - Patient's Chronic Conditions and Co-Morbidity Symptoms are Monitored and Maintained or Improved:   Monitor and assess patient's chronic conditions and comorbid symptoms for stability, deterioration, or improvement   Collaborate with multidisciplinary team to address chronic and comorbid conditions and prevent exacerbation or deterioration  Note: Patient verbalizes understanding to verbal information given on Venofer,action and possible side effects. Aware to call MD if develop complications.      Care plan reviewed with patient.  Patient verbalize understanding of the plan of care and contribute to goal setting.

## 2024-01-30 NOTE — PROGRESS NOTES
Patient tolerated Venofer without any complications.  Discharge instructions given to patient, Verbalizes understanding. Ambulated off unit per self in stable condition with all belongings.

## 2024-02-06 ENCOUNTER — HOSPITAL ENCOUNTER (OUTPATIENT)
Dept: INFUSION THERAPY | Age: 54
Discharge: HOME | End: 2024-02-06
Payer: COMMERCIAL

## 2024-02-06 VITALS
SYSTOLIC BLOOD PRESSURE: 115 MMHG | TEMPERATURE: 98.5 F | HEART RATE: 58 BPM | RESPIRATION RATE: 18 BRPM | OXYGEN SATURATION: 95 % | DIASTOLIC BLOOD PRESSURE: 65 MMHG

## 2024-02-06 DIAGNOSIS — Z98.84 HISTORY OF GASTRIC BYPASS: ICD-10-CM

## 2024-02-06 DIAGNOSIS — K90.9 MALABSORPTION OF IRON: ICD-10-CM

## 2024-02-06 DIAGNOSIS — D50.9 IRON DEFICIENCY ANEMIA, UNSPECIFIED IRON DEFICIENCY ANEMIA TYPE: ICD-10-CM

## 2024-02-06 DIAGNOSIS — E61.1 IRON DEFICIENCY: Primary | ICD-10-CM

## 2024-02-06 PROCEDURE — 6360000002 HC RX W HCPCS: Performed by: PHYSICIAN ASSISTANT

## 2024-02-06 PROCEDURE — 96365 THER/PROPH/DIAG IV INF INIT: CPT

## 2024-02-06 PROCEDURE — 96366 THER/PROPH/DIAG IV INF ADDON: CPT

## 2024-02-06 PROCEDURE — 96375 TX/PRO/DX INJ NEW DRUG ADDON: CPT

## 2024-02-06 PROCEDURE — 2580000003 HC RX 258: Performed by: PHYSICIAN ASSISTANT

## 2024-02-06 RX ORDER — ONDANSETRON 2 MG/ML
4 INJECTION INTRAMUSCULAR; INTRAVENOUS ONCE
Status: COMPLETED | OUTPATIENT
Start: 2024-02-06 | End: 2024-02-06

## 2024-02-06 RX ORDER — ALBUTEROL SULFATE 90 UG/1
4 AEROSOL, METERED RESPIRATORY (INHALATION) PRN
OUTPATIENT
Start: 2024-02-06

## 2024-02-06 RX ORDER — DIPHENHYDRAMINE HYDROCHLORIDE 50 MG/ML
50 INJECTION INTRAMUSCULAR; INTRAVENOUS
OUTPATIENT
Start: 2024-02-06

## 2024-02-06 RX ORDER — SODIUM CHLORIDE 9 MG/ML
5-250 INJECTION, SOLUTION INTRAVENOUS PRN
OUTPATIENT
Start: 2024-02-06

## 2024-02-06 RX ORDER — SODIUM CHLORIDE 9 MG/ML
5-250 INJECTION, SOLUTION INTRAVENOUS PRN
Status: DISCONTINUED | OUTPATIENT
Start: 2024-02-06 | End: 2024-02-07 | Stop reason: HOSPADM

## 2024-02-06 RX ORDER — HEPARIN 100 UNIT/ML
500 SYRINGE INTRAVENOUS PRN
OUTPATIENT
Start: 2024-02-06

## 2024-02-06 RX ORDER — ACETAMINOPHEN 325 MG/1
650 TABLET ORAL
OUTPATIENT
Start: 2024-02-06

## 2024-02-06 RX ORDER — SODIUM CHLORIDE 9 MG/ML
INJECTION, SOLUTION INTRAVENOUS CONTINUOUS
OUTPATIENT
Start: 2024-02-06

## 2024-02-06 RX ORDER — SODIUM CHLORIDE 0.9 % (FLUSH) 0.9 %
5-40 SYRINGE (ML) INJECTION PRN
OUTPATIENT
Start: 2024-02-06

## 2024-02-06 RX ORDER — ONDANSETRON 2 MG/ML
8 INJECTION INTRAMUSCULAR; INTRAVENOUS
OUTPATIENT
Start: 2024-02-06

## 2024-02-06 RX ORDER — ONDANSETRON 2 MG/ML
4 INJECTION INTRAMUSCULAR; INTRAVENOUS ONCE
Start: 2024-02-06 | End: 2024-02-06

## 2024-02-06 RX ORDER — EPINEPHRINE 1 MG/ML
0.3 INJECTION, SOLUTION INTRAMUSCULAR; SUBCUTANEOUS PRN
OUTPATIENT
Start: 2024-02-06

## 2024-02-06 RX ADMIN — SODIUM CHLORIDE 300 MG: 9 INJECTION, SOLUTION INTRAVENOUS at 12:36

## 2024-02-06 RX ADMIN — SODIUM CHLORIDE 200 ML/HR: 9 INJECTION, SOLUTION INTRAVENOUS at 12:28

## 2024-02-06 RX ADMIN — ONDANSETRON 4 MG: 2 INJECTION INTRAMUSCULAR; INTRAVENOUS at 12:44

## 2024-02-06 NOTE — PROGRESS NOTES
Patient tolerated venofer without any complications.  Patient kept for 20 minuets observation post infusion. Denies dizziness, lightheadedness, acute nausea or vomiting, headache, heart palpitations, rash/itching or increased SOB.    Last vital signs  /65   Pulse 58   Temp 98.5 °F (36.9 °C) (Oral)   Resp 18   SpO2 95%     Patient instructed if they experience any of the above symptoms following today's visit, he/she is to notify the Physician or go to the Emergency Dept.    Discharge instructions given to patient, Verbalizes understanding. Ambulated off unit per self in stable condition with all belongings.

## 2024-02-06 NOTE — PLAN OF CARE
Problem: Safety - Adult  Goal: Free from fall injury  Outcome: Adequate for Discharge  Flowsheets (Taken 2/6/2024 1228)  Free From Fall Injury:   Instruct family/caregiver on patient safety   Based on caregiver fall risk screen, instruct family/caregiver to ask for assistance with transferring infant if caregiver noted to have fall risk factors  Note: Free from falls while in O.P. Oncology.Discussed the need to use the call light for assistance when getting up to ambulate.     Problem: Discharge Planning  Goal: Discharge to home or other facility with appropriate resources  Outcome: Adequate for Discharge  Flowsheets (Taken 2/6/2024 1228)  Discharge to home or other facility with appropriate resources:   Identify barriers to discharge with patient and caregiver   Identify discharge learning needs (meds, wound care, etc)  Note: Verbalize understanding of discharge instructions, follow up appointments, and when to call Physician.Discuss understanding of discharge instructions, follow up appointments and when to call Physician.     Problem: Chronic Conditions and Co-morbidities  Goal: Patient's chronic conditions and co-morbidity symptoms are monitored and maintained or improved  Outcome: Adequate for Discharge  Flowsheets (Taken 2/6/2024 1228)  Care Plan - Patient's Chronic Conditions and Co-Morbidity Symptoms are Monitored and Maintained or Improved:   Monitor and assess patient's chronic conditions and comorbid symptoms for stability, deterioration, or improvement   Collaborate with multidisciplinary team to address chronic and comorbid conditions and prevent exacerbation or deterioration  Note: Patient verbalizes understanding to verbal information given on venofer,action and possible side effects. Aware to call MD if develop complications.      Care plan reviewed with patient.  Patient verbalize understanding of the plan of care and contribute to goal setting.

## 2024-04-10 ENCOUNTER — HOSPITAL ENCOUNTER (OUTPATIENT)
Age: 54
Discharge: HOME | End: 2024-04-10
Payer: COMMERCIAL

## 2024-04-10 DIAGNOSIS — E61.1 IRON DEFICIENCY: ICD-10-CM

## 2024-04-10 DIAGNOSIS — Z98.84 HISTORY OF GASTRIC BYPASS: ICD-10-CM

## 2024-04-10 DIAGNOSIS — R53.83 OTHER FATIGUE: ICD-10-CM

## 2024-04-10 LAB
BASOPHILS ABSOLUTE: 0 THOU/MM3 (ref 0–0.1)
BASOPHILS NFR BLD AUTO: 0.8 %
DEPRECATED RDW RBC AUTO: 42.5 FL (ref 35–45)
EOSINOPHIL NFR BLD AUTO: 1.8 %
EOSINOPHILS ABSOLUTE: 0.1 THOU/MM3 (ref 0–0.4)
ERYTHROCYTE [DISTWIDTH] IN BLOOD BY AUTOMATED COUNT: 13.7 % (ref 11.5–14.5)
FERRITIN SERPL IA-MCNC: 137 NG/ML (ref 10–291)
HCT VFR BLD AUTO: 42.3 % (ref 37–47)
HGB BLD-MCNC: 14.2 GM/DL (ref 12–16)
IMM GRANULOCYTES # BLD AUTO: 0.02 THOU/MM3 (ref 0–0.07)
IMM GRANULOCYTES NFR BLD AUTO: 0.4 %
IRON SERPL-MCNC: 70 UG/DL (ref 50–170)
LYMPHOCYTES ABSOLUTE: 1.1 THOU/MM3 (ref 1–4.8)
LYMPHOCYTES NFR BLD AUTO: 20.7 %
MCH RBC QN AUTO: 28.4 PG (ref 26–33)
MCHC RBC AUTO-ENTMCNC: 33.6 GM/DL (ref 32.2–35.5)
MCV RBC AUTO: 84.6 FL (ref 81–99)
MONOCYTES ABSOLUTE: 0.6 THOU/MM3 (ref 0.4–1.3)
MONOCYTES NFR BLD AUTO: 10.8 %
NEUTROPHILS NFR BLD AUTO: 65.5 %
NRBC BLD AUTO-RTO: 0 /100 WBC
PLATELET # BLD AUTO: 328 THOU/MM3 (ref 130–400)
PMV BLD AUTO: 9.6 FL (ref 9.4–12.4)
RBC # BLD AUTO: 5 MILL/MM3 (ref 4.2–5.4)
SEGMENTED NEUTROPHILS ABSOLUTE COUNT: 3.3 THOU/MM3 (ref 1.8–7.7)
TIBC SERPL-MCNC: 379 UG/DL (ref 171–450)
WBC # BLD AUTO: 5.1 THOU/MM3 (ref 4.8–10.8)

## 2024-04-10 PROCEDURE — 36415 COLL VENOUS BLD VENIPUNCTURE: CPT

## 2024-04-10 PROCEDURE — 85025 COMPLETE CBC W/AUTO DIFF WBC: CPT

## 2024-04-10 PROCEDURE — 83550 IRON BINDING TEST: CPT

## 2024-04-10 PROCEDURE — 82728 ASSAY OF FERRITIN: CPT

## 2024-04-10 PROCEDURE — 83540 ASSAY OF IRON: CPT

## 2024-04-12 ENCOUNTER — OFFICE VISIT (OUTPATIENT)
Dept: ONCOLOGY | Age: 54
End: 2024-04-12
Payer: COMMERCIAL

## 2024-04-12 ENCOUNTER — HOSPITAL ENCOUNTER (OUTPATIENT)
Dept: INFUSION THERAPY | Age: 54
Discharge: HOME | End: 2024-04-12
Payer: COMMERCIAL

## 2024-04-12 VITALS
DIASTOLIC BLOOD PRESSURE: 81 MMHG | TEMPERATURE: 98.2 F | OXYGEN SATURATION: 96 % | SYSTOLIC BLOOD PRESSURE: 143 MMHG | HEART RATE: 65 BPM

## 2024-04-12 VITALS
OXYGEN SATURATION: 96 % | DIASTOLIC BLOOD PRESSURE: 81 MMHG | BODY MASS INDEX: 39.57 KG/M2 | TEMPERATURE: 98.2 F | RESPIRATION RATE: 16 BRPM | HEART RATE: 65 BPM | SYSTOLIC BLOOD PRESSURE: 143 MMHG | WEIGHT: 276.4 LBS | HEIGHT: 70 IN

## 2024-04-12 DIAGNOSIS — E61.1 IRON DEFICIENCY: Primary | ICD-10-CM

## 2024-04-12 DIAGNOSIS — Z98.84 HISTORY OF GASTRIC BYPASS: ICD-10-CM

## 2024-04-12 PROCEDURE — 3079F DIAST BP 80-89 MM HG: CPT | Performed by: PHYSICIAN ASSISTANT

## 2024-04-12 PROCEDURE — 99211 OFF/OP EST MAY X REQ PHY/QHP: CPT

## 2024-04-12 PROCEDURE — 3077F SYST BP >= 140 MM HG: CPT | Performed by: PHYSICIAN ASSISTANT

## 2024-04-12 PROCEDURE — 99213 OFFICE O/P EST LOW 20 MIN: CPT | Performed by: PHYSICIAN ASSISTANT

## 2024-04-12 RX ORDER — SULFAMETHOXAZOLE AND TRIMETHOPRIM 800; 160 MG/1; MG/1
1 TABLET ORAL DAILY
COMMUNITY

## 2024-04-12 NOTE — PATIENT INSTRUCTIONS
Return to clinic in 3 months  Labs on RTC: CBC, ferritin   Please call for questions or concerns.

## 2024-04-12 NOTE — PROGRESS NOTES
[facility] [address], [address]  [facility] [address]  Dept: [phone]  Dept Fax: [phone] Loc: [phone]      Visit Date:4/12/2024     Karyna Branch is a 53 y.o. female who presents today for:   Chief Complaint   Patient presents with    Follow-up     renae deficiency          HPI:   Karyna Branch is a 53 y.o. female followed for history of anemia. She previously followed with Dr. Escobar in the office. She was found to have anemia related to iron deficiency from poor absorption. The patient has history of gastric bypass surgery. She has intermittently required IV iron therapy.   She last required IV iron in April 2023.   -At July 2023 appointment reported last colonoscopy approximately 3 years ago. She follows with Dr. Kerns. She is on interferon B injections for MS. She has been instructed to follow up with Dr. Ramey for colonoscopy.   -She has periodic lab monitoring.   -She had EGD on 1/19/24 showing GERD with known Song's, surgical changes of thai-en-y gastric bypass. Colonoscopy same date showed multiple colon polyps removed. Esophageal bopsy showed chronically inflamed reactive squamous and columnar mucosa with features of reflux.  Negative for intestinal metaplasia or dysplasia.  Colon polyp showed tubular adenoma.  She was recommended repeat EGD in 3 years and colonoscopy in 5 years.      Interval History 4/12/2024:   The patient is here for follow-up evaluation.  She was last seen in January 2024.  She was noted to have worsening iron deficiency and received IV Venofer x 3.  She states fatigue is mildly improved; however, not back to baseline.  She affirms current UTI on antibiotics and recent MS flare.  She denies any abnormal bleeding.  Denies ED visits or hospitalizations.      PMH, SH, and FH:  I reviewed the patients medication list and allergy list as noted on the electronic medical record. The PMH, SH and FH were also reviewed as noted on the

## 2024-04-19 ENCOUNTER — OFFICE VISIT (OUTPATIENT)
Dept: FAMILY MEDICINE CLINIC | Age: 54
End: 2024-04-19
Payer: COMMERCIAL

## 2024-04-19 VITALS
SYSTOLIC BLOOD PRESSURE: 122 MMHG | RESPIRATION RATE: 16 BRPM | TEMPERATURE: 98.3 F | OXYGEN SATURATION: 99 % | HEART RATE: 79 BPM | WEIGHT: 277 LBS | DIASTOLIC BLOOD PRESSURE: 74 MMHG | BODY MASS INDEX: 39.65 KG/M2 | HEIGHT: 70 IN

## 2024-04-19 DIAGNOSIS — R30.0 DYSURIA: Primary | ICD-10-CM

## 2024-04-19 DIAGNOSIS — N30.00 ACUTE CYSTITIS WITHOUT HEMATURIA: ICD-10-CM

## 2024-04-19 LAB
BILIRUBIN URINE: NEGATIVE
BLOOD URINE, POC: NEGATIVE
CHARACTER, URINE: ABNORMAL
COLOR, URINE: ABNORMAL
GLUCOSE URINE: NEGATIVE MG/DL
KETONES, URINE: ABNORMAL
LEUKOCYTE CLUMPS, URINE: ABNORMAL
NITRITE, URINE: NEGATIVE
PH, URINE: 7 (ref 5–9)
PROTEIN, URINE: NEGATIVE MG/DL
SPECIFIC GRAVITY, URINE: 1.02 (ref 1–1.03)
UROBILINOGEN, URINE: 1 EU/DL (ref 0–1)

## 2024-04-19 PROCEDURE — 3078F DIAST BP <80 MM HG: CPT

## 2024-04-19 PROCEDURE — 81003 URINALYSIS AUTO W/O SCOPE: CPT

## 2024-04-19 PROCEDURE — 3074F SYST BP LT 130 MM HG: CPT

## 2024-04-19 PROCEDURE — 99213 OFFICE O/P EST LOW 20 MIN: CPT

## 2024-04-19 RX ORDER — NITROFURANTOIN 25; 75 MG/1; MG/1
100 CAPSULE ORAL 2 TIMES DAILY
Qty: 10 CAPSULE | Refills: 0 | Status: SHIPPED | OUTPATIENT
Start: 2024-04-19 | End: 2024-04-24

## 2024-04-19 SDOH — ECONOMIC STABILITY: FOOD INSECURITY: WITHIN THE PAST 12 MONTHS, YOU WORRIED THAT YOUR FOOD WOULD RUN OUT BEFORE YOU GOT MONEY TO BUY MORE.: NEVER TRUE

## 2024-04-19 SDOH — ECONOMIC STABILITY: HOUSING INSECURITY
IN THE LAST 12 MONTHS, WAS THERE A TIME WHEN YOU DID NOT HAVE A STEADY PLACE TO SLEEP OR SLEPT IN A SHELTER (INCLUDING NOW)?: NO

## 2024-04-19 SDOH — ECONOMIC STABILITY: FOOD INSECURITY: WITHIN THE PAST 12 MONTHS, THE FOOD YOU BOUGHT JUST DIDN'T LAST AND YOU DIDN'T HAVE MONEY TO GET MORE.: NEVER TRUE

## 2024-04-19 SDOH — ECONOMIC STABILITY: INCOME INSECURITY: HOW HARD IS IT FOR YOU TO PAY FOR THE VERY BASICS LIKE FOOD, HOUSING, MEDICAL CARE, AND HEATING?: NOT VERY HARD

## 2024-04-19 ASSESSMENT — PATIENT HEALTH QUESTIONNAIRE - PHQ9
2. FEELING DOWN, DEPRESSED OR HOPELESS: SEVERAL DAYS
1. LITTLE INTEREST OR PLEASURE IN DOING THINGS: SEVERAL DAYS
SUM OF ALL RESPONSES TO PHQ QUESTIONS 1-9: 2
SUM OF ALL RESPONSES TO PHQ9 QUESTIONS 1 & 2: 2
SUM OF ALL RESPONSES TO PHQ QUESTIONS 1-9: 2

## 2024-04-19 NOTE — PROGRESS NOTES
Attending Physician Note    I reviewed the patient's medical history, the resident's findings on physical examination, the patient's diagnoses, and treatment plan as documented in the resident note.  I concur with the treatment plan as documented.  Any additional suggestions noted below.    GE modifier    Brief summary:   UTI sx, persistent despite treatment with Bactrim.  UA with trace leukocytes.  Check cx.  Empiric treatment with nitrofurantoin.    
pain.   Gastrointestinal:  Negative for abdominal pain, constipation, diarrhea and vomiting.   Genitourinary:  Positive for dysuria, frequency and urgency. Negative for hematuria, vaginal bleeding and vaginal discharge.       Physical Exam  Vitals reviewed.   Constitutional:       General: She is not in acute distress.     Appearance: Normal appearance. She is normal weight.   HENT:      Head: Normocephalic and atraumatic.   Eyes:      Pupils: Pupils are equal.      Right eye: Pupil is round.      Left eye: Pupil is round.   Cardiovascular:      Rate and Rhythm: Normal rate and regular rhythm.      Pulses: Normal pulses.      Heart sounds: Normal heart sounds. No murmur heard.     No friction rub. No gallop.   Pulmonary:      Effort: Pulmonary effort is normal. No respiratory distress.      Breath sounds: Normal breath sounds. No wheezing, rhonchi or rales.   Abdominal:      General: Bowel sounds are normal. There is no distension.      Palpations: Abdomen is soft.      Tenderness: There is no abdominal tenderness. There is no guarding.   Musculoskeletal:      Right lower leg: No edema.      Left lower leg: No edema.   Skin:     General: Skin is warm and dry.      Capillary Refill: Capillary refill takes less than 2 seconds.   Neurological:      Mental Status: She is alert and oriented to person, place, and time.   Psychiatric:         Mood and Affect: Mood normal.         Behavior: Behavior normal.         Thought Content: Thought content normal.         Judgment: Judgment normal.           Vitals:    04/19/24 0925   BP: 122/74   Site: Right Upper Arm   Position: Sitting   Cuff Size: Large Adult   Pulse: 79   Resp: 16   Temp: 98.3 °F (36.8 °C)   TempSrc: Oral   SpO2: 99%   Weight: 125.6 kg (277 lb)   Height: 1.778 m (5' 10\")         An electronic signature was used to authenticate this note.    --Dami Mansfield MD

## 2024-04-20 LAB
BACTERIA UR CULT: ABNORMAL
ORGANISM: ABNORMAL

## 2024-04-23 ENCOUNTER — TELEPHONE (OUTPATIENT)
Dept: FAMILY MEDICINE CLINIC | Age: 54
End: 2024-04-23

## 2024-04-23 DIAGNOSIS — R30.0 DYSURIA: Primary | ICD-10-CM

## 2024-04-23 NOTE — TELEPHONE ENCOUNTER
Attempted to call patient,: Not answered.  Please call patient to inform them that the  urine culture was indeterminate-mixed growth.  Can recheck if still symptomatic.  Follow-up about 1 week from last visit if symptoms worsen or fail to improve.

## 2024-04-25 ENCOUNTER — NURSE ONLY (OUTPATIENT)
Dept: LAB | Age: 54
End: 2024-04-25

## 2024-04-25 DIAGNOSIS — R30.0 DYSURIA: ICD-10-CM

## 2024-04-25 LAB
BACTERIA: ABNORMAL
BILIRUB UR QL STRIP: NEGATIVE
CASTS #/AREA URNS LPF: ABNORMAL /LPF
CASTS #/AREA URNS LPF: ABNORMAL /LPF
CHARACTER UR: ABNORMAL
CHARCOAL URNS QL MICRO: ABNORMAL
COLOR UR: YELLOW
CRYSTALS URNS QL MICRO: ABNORMAL
EPITHELIAL CELLS, UA: ABNORMAL /HPF
GLUCOSE UR QL STRIP.AUTO: NEGATIVE MG/DL
HGB UR QL STRIP.AUTO: NEGATIVE
KETONES UR QL STRIP.AUTO: NEGATIVE
LEUKOCYTE ESTERASE UR QL STRIP.AUTO: NEGATIVE
NITRITE UR QL STRIP.AUTO: NEGATIVE
PH UR STRIP.AUTO: 7 [PH] (ref 5–9)
PROT UR STRIP.AUTO-MCNC: NEGATIVE MG/DL
RBC #/AREA URNS HPF: ABNORMAL /HPF
RENAL EPI CELLS #/AREA URNS HPF: ABNORMAL /[HPF]
SPECIFIC GRAVITY UA: 1.02 (ref 1–1.03)
UROBILINOGEN, URINE: 0.2 EU/DL (ref 0–1)
WBC #/AREA URNS HPF: ABNORMAL /HPF
YEAST LIKE FUNGI URNS QL MICRO: ABNORMAL

## 2024-04-25 NOTE — TELEPHONE ENCOUNTER
Called, discussed with patient.  Still symptomatic.  Previous culture had mixed growth.  Patient reports may not have given a good sample as did not need to use the bathroom.  Ordering repeat urinalysis and culture.  Patient plans on going to lab today.  Answered any questions.

## 2024-04-25 NOTE — TELEPHONE ENCOUNTER
Pt called in and stated that she is still having Urgency and Incontinence.   Still drinking the cranberry juice.   ATB done.

## 2024-05-01 ENCOUNTER — TELEPHONE (OUTPATIENT)
Dept: FAMILY MEDICINE CLINIC | Age: 54
End: 2024-05-01

## 2024-05-01 NOTE — TELEPHONE ENCOUNTER
Called patient to discuss results, UA did not indicate UTI.  Patient reports no longer having UTI symptoms, but had pain with urination this a.m. and passed a kidney stone.  Patient prefers not to have further workup at this time, reports is drinking plenty of fluids and taking ibuprofen as needed instead.  Patient to follow-up as needed if symptoms worsen or do not improve.

## 2024-05-15 DIAGNOSIS — N30.00 ACUTE CYSTITIS WITHOUT HEMATURIA: ICD-10-CM

## 2024-05-15 DIAGNOSIS — R30.0 DYSURIA: ICD-10-CM

## 2024-05-16 RX ORDER — NITROFURANTOIN 25; 75 MG/1; MG/1
CAPSULE ORAL
Qty: 10 CAPSULE | Refills: 0 | OUTPATIENT
Start: 2024-05-16

## 2024-05-16 NOTE — TELEPHONE ENCOUNTER
Patient's last appointment was : 4/19/2024 with our   Patient's next appointment is : Not Found  Last refilled on: 4/19/2024  Which pharmacy does the script need sent to: [facility], [address]      No results found for: \"LABA1C\"  Lab Results   Component Value Date    CHOL 227 (H) 01/31/2014    TRIG 56 01/31/2014    HDL 87 01/31/2014     Lab Results   Component Value Date     01/03/2023    K 4.6 01/03/2023     01/31/2014    CO2 30 01/31/2014    BUN 14 01/31/2014    CREATININE 0.7 01/03/2023    GLUCOSE 92 01/31/2014    CALCIUM 8.7 01/31/2014    BILITOT 0.5 01/31/2014    ALKPHOS 51 01/31/2014    AST 24 01/31/2014    ALT 21 01/31/2014    LABGLOM >60 01/03/2023     Lab Results   Component Value Date    TSH 1.410 04/29/2020    Y5JVMDX 128 01/04/2012    T4FREE 1.11 01/04/2012     Lab Results   Component Value Date    WBC 5.1 04/10/2024    HGB 14.2 04/10/2024    HCT 42.3 04/10/2024    MCV 84.6 04/10/2024     04/10/2024

## 2024-07-11 ENCOUNTER — LAB (OUTPATIENT)
Dept: LAB | Age: 54
End: 2024-07-11

## 2024-07-11 DIAGNOSIS — Z98.84 HISTORY OF GASTRIC BYPASS: ICD-10-CM

## 2024-07-11 DIAGNOSIS — E61.1 IRON DEFICIENCY: ICD-10-CM

## 2024-07-11 LAB
BASOPHILS ABSOLUTE: 0.1 THOU/MM3 (ref 0–0.1)
BASOPHILS NFR BLD AUTO: 0.9 %
DEPRECATED RDW RBC AUTO: 41.2 FL (ref 35–45)
EOSINOPHIL NFR BLD AUTO: 3.2 %
EOSINOPHILS ABSOLUTE: 0.2 THOU/MM3 (ref 0–0.4)
ERYTHROCYTE [DISTWIDTH] IN BLOOD BY AUTOMATED COUNT: 13.2 % (ref 11.5–14.5)
FERRITIN SERPL IA-MCNC: 39 NG/ML (ref 10–291)
FOLATE SERPL-MCNC: > 20 NG/ML (ref 4.8–24.2)
HCT VFR BLD AUTO: 40.8 % (ref 37–47)
HGB BLD-MCNC: 13.4 GM/DL (ref 12–16)
IMM GRANULOCYTES # BLD AUTO: 0.04 THOU/MM3 (ref 0–0.07)
IMM GRANULOCYTES NFR BLD AUTO: 0.7 %
IRON SERPL-MCNC: 67 UG/DL (ref 50–170)
LYMPHOCYTES ABSOLUTE: 1.2 THOU/MM3 (ref 1–4.8)
LYMPHOCYTES NFR BLD AUTO: 22.2 %
MCH RBC QN AUTO: 28.4 PG (ref 26–33)
MCHC RBC AUTO-ENTMCNC: 32.8 GM/DL (ref 32.2–35.5)
MCV RBC AUTO: 86.4 FL (ref 81–99)
MONOCYTES ABSOLUTE: 0.6 THOU/MM3 (ref 0.4–1.3)
MONOCYTES NFR BLD AUTO: 10.2 %
NEUTROPHILS ABSOLUTE: 3.5 THOU/MM3 (ref 1.8–7.7)
NEUTROPHILS NFR BLD AUTO: 62.8 %
NRBC BLD AUTO-RTO: 0 /100 WBC
PLATELET # BLD AUTO: 351 THOU/MM3 (ref 130–400)
PMV BLD AUTO: 10.3 FL (ref 9.4–12.4)
RBC # BLD AUTO: 4.72 MILL/MM3 (ref 4.2–5.4)
TIBC SERPL-MCNC: 398 UG/DL (ref 171–450)
VIT B12 SERPL-MCNC: 696 PG/ML (ref 211–911)
WBC # BLD AUTO: 5.6 THOU/MM3 (ref 4.8–10.8)

## 2024-07-16 ENCOUNTER — HOSPITAL ENCOUNTER (OUTPATIENT)
Dept: INFUSION THERAPY | Age: 54
Discharge: HOME | End: 2024-07-16
Payer: COMMERCIAL

## 2024-07-16 ENCOUNTER — OFFICE VISIT (OUTPATIENT)
Dept: ONCOLOGY | Age: 54
End: 2024-07-16
Payer: COMMERCIAL

## 2024-07-16 VITALS
RESPIRATION RATE: 18 BRPM | TEMPERATURE: 98.3 F | SYSTOLIC BLOOD PRESSURE: 149 MMHG | DIASTOLIC BLOOD PRESSURE: 40 MMHG | HEART RATE: 73 BPM | OXYGEN SATURATION: 92 %

## 2024-07-16 VITALS
HEIGHT: 70 IN | WEIGHT: 272.6 LBS | DIASTOLIC BLOOD PRESSURE: 40 MMHG | TEMPERATURE: 98.3 F | SYSTOLIC BLOOD PRESSURE: 149 MMHG | HEART RATE: 73 BPM | RESPIRATION RATE: 18 BRPM | OXYGEN SATURATION: 92 % | BODY MASS INDEX: 39.03 KG/M2

## 2024-07-16 DIAGNOSIS — Z98.84 HISTORY OF GASTRIC BYPASS: ICD-10-CM

## 2024-07-16 DIAGNOSIS — E61.1 IRON DEFICIENCY: Primary | ICD-10-CM

## 2024-07-16 PROCEDURE — 3078F DIAST BP <80 MM HG: CPT | Performed by: PHYSICIAN ASSISTANT

## 2024-07-16 PROCEDURE — 3077F SYST BP >= 140 MM HG: CPT | Performed by: PHYSICIAN ASSISTANT

## 2024-07-16 PROCEDURE — 99211 OFF/OP EST MAY X REQ PHY/QHP: CPT

## 2024-07-16 PROCEDURE — 99214 OFFICE O/P EST MOD 30 MIN: CPT | Performed by: PHYSICIAN ASSISTANT

## 2024-07-16 RX ORDER — ACETAMINOPHEN 325 MG/1
650 TABLET ORAL
OUTPATIENT
Start: 2024-07-16

## 2024-07-16 RX ORDER — ONDANSETRON 2 MG/ML
8 INJECTION INTRAMUSCULAR; INTRAVENOUS
OUTPATIENT
Start: 2024-07-16

## 2024-07-16 RX ORDER — FAMOTIDINE 10 MG/ML
20 INJECTION, SOLUTION INTRAVENOUS
OUTPATIENT
Start: 2024-07-16

## 2024-07-16 RX ORDER — SODIUM CHLORIDE 0.9 % (FLUSH) 0.9 %
5-40 SYRINGE (ML) INJECTION PRN
OUTPATIENT
Start: 2024-07-16

## 2024-07-16 RX ORDER — SODIUM CHLORIDE 9 MG/ML
5-250 INJECTION, SOLUTION INTRAVENOUS PRN
OUTPATIENT
Start: 2024-07-16

## 2024-07-16 RX ORDER — SODIUM CHLORIDE 9 MG/ML
INJECTION, SOLUTION INTRAVENOUS CONTINUOUS
OUTPATIENT
Start: 2024-07-16

## 2024-07-16 RX ORDER — HEPARIN SODIUM (PORCINE) LOCK FLUSH IV SOLN 100 UNIT/ML 100 UNIT/ML
500 SOLUTION INTRAVENOUS PRN
OUTPATIENT
Start: 2024-07-16

## 2024-07-16 RX ORDER — DIPHENHYDRAMINE HYDROCHLORIDE 50 MG/ML
50 INJECTION INTRAMUSCULAR; INTRAVENOUS
OUTPATIENT
Start: 2024-07-16

## 2024-07-16 RX ORDER — ONDANSETRON 2 MG/ML
4 INJECTION INTRAMUSCULAR; INTRAVENOUS ONCE
Start: 2024-07-16 | End: 2024-07-16

## 2024-07-16 RX ORDER — EPINEPHRINE 1 MG/ML
0.3 INJECTION, SOLUTION, CONCENTRATE INTRAVENOUS PRN
OUTPATIENT
Start: 2024-07-16

## 2024-07-16 RX ORDER — ALBUTEROL SULFATE 90 UG/1
4 AEROSOL, METERED RESPIRATORY (INHALATION) PRN
OUTPATIENT
Start: 2024-07-16

## 2024-07-16 NOTE — PROGRESS NOTES
[facility] [address], [address]  [facility] [address]  Dept: [phone]  Dept Fax: [phone] Loc: [phone]      Visit Date:7/16/2024     Karyna Branch is a 54 y.o. female who presents today for:   Chief Complaint   Patient presents with    Follow-up     Iron deficiency        HPI:   Karyna Branch is a 54 y.o. female followed for history of anemia. She previously followed with Dr. Escobar in the office. She was found to have anemia related to iron deficiency from poor absorption. The patient has history of gastric bypass surgery. She has intermittently required IV iron therapy.   She required IV iron in April 2023.   -At July 2023 appointment reported last colonoscopy approximately 3 years ago. She follows with Dr. Kerns. She is on interferon B injections for MS. She has been instructed to follow up with Dr. Ramey for colonoscopy.   -She has periodic lab monitoring.   -She had EGD on 1/19/24 showing GERD with known Song's, surgical changes of thai-en-y gastric bypass. Colonoscopy same date showed multiple colon polyps removed. Esophageal bopsy showed chronically inflamed reactive squamous and columnar mucosa with features of reflux.  Negative for intestinal metaplasia or dysplasia.  Colon polyp showed tubular adenoma.  She was recommended repeat EGD in 3 years and colonoscopy in 5 years.    She has routine lab monitoring. She received IV Venofer x3 in January/February 2024.       Interval History 7/16/2024:   The patient is here for follow-up evaluation. She was last seen in clinic in April 2024. She states she has had increased fatigue. She affirms intermittent lightheadedness and dizziness but feels this may be related to weather and blood pressure changes.  She denies chest pain, shortness of breath, MEEKS or palpitations.  She denies any abnormal bleeding.    PMH, SH, and FH:  I reviewed the patients medication list and allergy list as noted on the electronic

## 2024-07-28 ENCOUNTER — HOSPITAL ENCOUNTER (EMERGENCY)
Age: 54
Discharge: HOME | End: 2024-07-28
Attending: STUDENT IN AN ORGANIZED HEALTH CARE EDUCATION/TRAINING PROGRAM
Payer: COMMERCIAL

## 2024-07-28 ENCOUNTER — APPOINTMENT (OUTPATIENT)
Dept: CT IMAGING | Age: 54
End: 2024-07-28
Payer: COMMERCIAL

## 2024-07-28 VITALS
RESPIRATION RATE: 17 BRPM | DIASTOLIC BLOOD PRESSURE: 93 MMHG | TEMPERATURE: 98.7 F | SYSTOLIC BLOOD PRESSURE: 154 MMHG | HEART RATE: 91 BPM | HEIGHT: 70 IN | BODY MASS INDEX: 37.22 KG/M2 | WEIGHT: 260 LBS | OXYGEN SATURATION: 98 %

## 2024-07-28 DIAGNOSIS — K52.9 COLITIS: ICD-10-CM

## 2024-07-28 DIAGNOSIS — R19.7 NAUSEA VOMITING AND DIARRHEA: Primary | ICD-10-CM

## 2024-07-28 DIAGNOSIS — R11.2 NAUSEA VOMITING AND DIARRHEA: Primary | ICD-10-CM

## 2024-07-28 LAB
ALBUMIN SERPL BCG-MCNC: 3.3 G/DL (ref 3.5–5.1)
ALP SERPL-CCNC: 67 U/L (ref 38–126)
ALT SERPL W/O P-5'-P-CCNC: 22 U/L (ref 11–66)
ANION GAP SERPL CALC-SCNC: 15 MEQ/L (ref 8–16)
AST SERPL-CCNC: 19 U/L (ref 5–40)
B-HCG SERPL QL: NEGATIVE
BACTERIA URNS QL MICRO: ABNORMAL /HPF
BASOPHILS ABSOLUTE: 0 THOU/MM3 (ref 0–0.1)
BASOPHILS NFR BLD AUTO: 0.5 %
BILIRUB CONJ SERPL-MCNC: < 0.1 MG/DL (ref 0.1–13.8)
BILIRUB SERPL-MCNC: 0.4 MG/DL (ref 0.3–1.2)
BILIRUB UR QL STRIP.AUTO: NEGATIVE
BUN SERPL-MCNC: 13 MG/DL (ref 7–22)
C CAYETANENSIS DNA SPEC QL NAA+PROBE: NOT DETECTED
CALCIUM SERPL-MCNC: 8.8 MG/DL (ref 8.5–10.5)
CAMPY SP DNA.DIARRHEA STL QL NAA+PROBE: DETECTED
CASTS #/AREA URNS LPF: ABNORMAL /LPF
CASTS 2: ABNORMAL /LPF
CHARACTER UR: ABNORMAL
CHLORIDE SERPL-SCNC: 98 MEQ/L (ref 98–111)
CO2 SERPL-SCNC: 24 MEQ/L (ref 23–33)
COLOR, UA: ABNORMAL
CREAT SERPL-MCNC: 0.5 MG/DL (ref 0.4–1.2)
CRYPTOSP DNA SPEC QL NAA+PROBE: NOT DETECTED
CRYSTALS URNS MICRO: ABNORMAL
DEPRECATED RDW RBC AUTO: 39.8 FL (ref 35–45)
E COLI O157H7 DNA SPEC QL NAA+PROBE: ABNORMAL
E HISTOLYT DNA SPEC QL NAA+PROBE: NOT DETECTED
EAEC PAA PLAS AGGR+AATA ST NAA+NON-PRB: NOT DETECTED
EC STX1+STX2 + H7 FLIC SPEC NAA+PROBE: NOT DETECTED
EOSINOPHIL NFR BLD AUTO: 0.8 %
EOSINOPHILS ABSOLUTE: 0.1 THOU/MM3 (ref 0–0.4)
EPEC EAE GENE STL QL NAA+NON-PROBE: NOT DETECTED
EPITHELIAL CELLS, UA: ABNORMAL /HPF
ERYTHROCYTE [DISTWIDTH] IN BLOOD BY AUTOMATED COUNT: 12.8 % (ref 11.5–14.5)
ETEC LTA+ST1A+ST1B TOX ST NAA+NON-PROBE: NOT DETECTED
FLUAV RNA RESP QL NAA+PROBE: NOT DETECTED
FLUBV RNA RESP QL NAA+PROBE: NOT DETECTED
G LAMBLIA DNA SPEC QL NAA+PROBE: NOT DETECTED
GFR SERPL CREATININE-BSD FRML MDRD: > 90 ML/MIN/1.73M2
GLUCOSE SERPL-MCNC: 78 MG/DL (ref 70–108)
GLUCOSE UR QL STRIP.AUTO: NEGATIVE MG/DL
HADV DNA SPEC QL NAA+PROBE: NOT DETECTED
HASTV RNA SPEC QL NAA+PROBE: NOT DETECTED
HCT VFR BLD AUTO: 38.4 % (ref 37–47)
HGB BLD-MCNC: 12.7 GM/DL (ref 12–16)
HGB UR QL STRIP.AUTO: NEGATIVE
IMM GRANULOCYTES # BLD AUTO: 0.02 THOU/MM3 (ref 0–0.07)
IMM GRANULOCYTES NFR BLD AUTO: 0.3 %
KETONES UR QL STRIP.AUTO: 80
LIPASE SERPL-CCNC: 12.8 U/L (ref 5.6–51.3)
LYMPHOCYTES ABSOLUTE: 0.8 THOU/MM3 (ref 1–4.8)
LYMPHOCYTES NFR BLD AUTO: 12.9 %
MAGNESIUM SERPL-MCNC: 1.9 MG/DL (ref 1.6–2.4)
MCH RBC QN AUTO: 28.3 PG (ref 26–33)
MCHC RBC AUTO-ENTMCNC: 33.1 GM/DL (ref 32.2–35.5)
MCV RBC AUTO: 85.7 FL (ref 81–99)
MISCELLANEOUS 2: ABNORMAL
MONOCYTES ABSOLUTE: 0.8 THOU/MM3 (ref 0.4–1.3)
MONOCYTES NFR BLD AUTO: 12.1 %
NEUTROPHILS ABSOLUTE: 4.6 THOU/MM3 (ref 1.8–7.7)
NEUTROPHILS NFR BLD AUTO: 73.4 %
NITRITE UR QL STRIP: NEGATIVE
NOROVIRUS GI + GII RNA STL NAA+PROBE: NOT DETECTED
NRBC BLD AUTO-RTO: 0 /100 WBC
OSMOLALITY SERPL CALC.SUM OF ELEC: 272.8 MOSMOL/KG (ref 275–300)
P SHIGELLOIDES DNA STL QL NAA+PROBE: NOT DETECTED
PH UR STRIP.AUTO: 6.5 [PH] (ref 5–9)
PLATELET # BLD AUTO: 288 THOU/MM3 (ref 130–400)
PMV BLD AUTO: 9.7 FL (ref 9.4–12.4)
POTASSIUM SERPL-SCNC: 2.9 MEQ/L (ref 3.5–5.2)
PROT SERPL-MCNC: 6.3 G/DL (ref 6.1–8)
PROT UR STRIP.AUTO-MCNC: 30 MG/DL
RBC # BLD AUTO: 4.48 MILL/MM3 (ref 4.2–5.4)
RBC URINE: ABNORMAL /HPF
RENAL EPI CELLS #/AREA URNS HPF: ABNORMAL /[HPF]
RV RNA SPEC QL NAA+PROBE: NOT DETECTED
SALMONELLA DNA SPEC QL NAA+PROBE: NOT DETECTED
SAPOVIRUS RNA SPEC QL NAA+PROBE: NOT DETECTED
SARS-COV-2 RNA RESP QL NAA+PROBE: NOT DETECTED
SHIGELLA SP+EIEC IPAH ST NAA+NON-PROBE: NOT DETECTED
SODIUM SERPL-SCNC: 137 MEQ/L (ref 135–145)
SP GR UR REFRACT.AUTO: 1.03 (ref 1–1.03)
UROBILINOGEN, URINE: 1 EU/DL (ref 0–1)
V CHOLERAE DNA SPEC QL NAA+PROBE: NOT DETECTED
VIBRIO DNA SPEC NAA+PROBE: NOT DETECTED
WBC # BLD AUTO: 6.3 THOU/MM3 (ref 4.8–10.8)
WBC #/AREA URNS HPF: ABNORMAL /HPF
WBC #/AREA URNS HPF: ABNORMAL /[HPF]
Y ENTERO RECN STL QL NAA+PROBE: NOT DETECTED
YEAST LIKE FUNGI URNS QL MICRO: ABNORMAL

## 2024-07-28 PROCEDURE — 87636 SARSCOV2 & INF A&B AMP PRB: CPT

## 2024-07-28 PROCEDURE — 81001 URINALYSIS AUTO W/SCOPE: CPT

## 2024-07-28 PROCEDURE — 83735 ASSAY OF MAGNESIUM: CPT

## 2024-07-28 PROCEDURE — 87328 CRYPTOSPORIDIUM AG IA: CPT

## 2024-07-28 PROCEDURE — 80053 COMPREHEN METABOLIC PANEL: CPT

## 2024-07-28 PROCEDURE — 99285 EMERGENCY DEPT VISIT HI MDM: CPT

## 2024-07-28 PROCEDURE — 84703 CHORIONIC GONADOTROPIN ASSAY: CPT

## 2024-07-28 PROCEDURE — 6360000002 HC RX W HCPCS: Performed by: STUDENT IN AN ORGANIZED HEALTH CARE EDUCATION/TRAINING PROGRAM

## 2024-07-28 PROCEDURE — 74177 CT ABD & PELVIS W/CONTRAST: CPT

## 2024-07-28 PROCEDURE — 82248 BILIRUBIN DIRECT: CPT

## 2024-07-28 PROCEDURE — 87077 CULTURE AEROBIC IDENTIFY: CPT

## 2024-07-28 PROCEDURE — 87045 FECES CULTURE AEROBIC BACT: CPT

## 2024-07-28 PROCEDURE — 87507 IADNA-DNA/RNA PROBE TQ 12-25: CPT

## 2024-07-28 PROCEDURE — 83690 ASSAY OF LIPASE: CPT

## 2024-07-28 PROCEDURE — 2580000003 HC RX 258: Performed by: STUDENT IN AN ORGANIZED HEALTH CARE EDUCATION/TRAINING PROGRAM

## 2024-07-28 PROCEDURE — 6360000004 HC RX CONTRAST MEDICATION: Performed by: STUDENT IN AN ORGANIZED HEALTH CARE EDUCATION/TRAINING PROGRAM

## 2024-07-28 PROCEDURE — 36415 COLL VENOUS BLD VENIPUNCTURE: CPT

## 2024-07-28 PROCEDURE — 87427 SHIGA-LIKE TOXIN AG IA: CPT

## 2024-07-28 PROCEDURE — 85025 COMPLETE CBC W/AUTO DIFF WBC: CPT

## 2024-07-28 PROCEDURE — 96374 THER/PROPH/DIAG INJ IV PUSH: CPT

## 2024-07-28 PROCEDURE — 6370000000 HC RX 637 (ALT 250 FOR IP): Performed by: STUDENT IN AN ORGANIZED HEALTH CARE EDUCATION/TRAINING PROGRAM

## 2024-07-28 PROCEDURE — 87086 URINE CULTURE/COLONY COUNT: CPT

## 2024-07-28 RX ORDER — POTASSIUM CHLORIDE 20 MEQ/1
20 TABLET, EXTENDED RELEASE ORAL DAILY
Qty: 5 TABLET | Refills: 0 | Status: SHIPPED | OUTPATIENT
Start: 2024-07-28 | End: 2024-08-02

## 2024-07-28 RX ORDER — 0.9 % SODIUM CHLORIDE 0.9 %
1000 INTRAVENOUS SOLUTION INTRAVENOUS ONCE
Status: COMPLETED | OUTPATIENT
Start: 2024-07-28 | End: 2024-07-28

## 2024-07-28 RX ORDER — POTASSIUM CHLORIDE 20 MEQ/1
40 TABLET, EXTENDED RELEASE ORAL ONCE
Status: COMPLETED | OUTPATIENT
Start: 2024-07-28 | End: 2024-07-28

## 2024-07-28 RX ORDER — ONDANSETRON 2 MG/ML
4 INJECTION INTRAMUSCULAR; INTRAVENOUS ONCE
Status: COMPLETED | OUTPATIENT
Start: 2024-07-28 | End: 2024-07-28

## 2024-07-28 RX ORDER — AZITHROMYCIN 500 MG/1
500 TABLET, FILM COATED ORAL DAILY
Qty: 3 TABLET | Refills: 0 | Status: SHIPPED | OUTPATIENT
Start: 2024-07-28 | End: 2024-07-31

## 2024-07-28 RX ORDER — DICYCLOMINE HYDROCHLORIDE 10 MG/1
10 CAPSULE ORAL
Qty: 40 CAPSULE | Refills: 0 | Status: SHIPPED | OUTPATIENT
Start: 2024-07-28 | End: 2024-08-07

## 2024-07-28 RX ORDER — ONDANSETRON 4 MG/1
4 TABLET, ORALLY DISINTEGRATING ORAL 3 TIMES DAILY PRN
Qty: 21 TABLET | Refills: 0 | Status: SHIPPED | OUTPATIENT
Start: 2024-07-28

## 2024-07-28 RX ADMIN — ONDANSETRON 4 MG: 2 INJECTION INTRAMUSCULAR; INTRAVENOUS at 18:02

## 2024-07-28 RX ADMIN — POTASSIUM CHLORIDE 40 MEQ: 1500 TABLET, EXTENDED RELEASE ORAL at 19:02

## 2024-07-28 RX ADMIN — IOPAMIDOL 80 ML: 755 INJECTION, SOLUTION INTRAVENOUS at 18:40

## 2024-07-28 RX ADMIN — SODIUM CHLORIDE 1000 ML: 9 INJECTION, SOLUTION INTRAVENOUS at 18:01

## 2024-07-28 ASSESSMENT — PAIN SCALES - GENERAL: PAINLEVEL_OUTOF10: 4

## 2024-07-28 ASSESSMENT — PAIN DESCRIPTION - LOCATION: LOCATION: ABDOMEN

## 2024-07-28 ASSESSMENT — PAIN - FUNCTIONAL ASSESSMENT
PAIN_FUNCTIONAL_ASSESSMENT: NONE - DENIES PAIN
PAIN_FUNCTIONAL_ASSESSMENT: 0-10

## 2024-07-28 ASSESSMENT — PAIN DESCRIPTION - DESCRIPTORS: DESCRIPTORS: CRAMPING

## 2024-07-28 NOTE — ED TRIAGE NOTES
Pt reports to the ED with complaints of nausea, diarrhea, and vomiting. Pt symptoms started Friday after interacting with her mother who had the same symptoms. Pt pain 4/10 with abdominal cramping.

## 2024-07-28 NOTE — ED PROVIDER NOTES
[facility] EMERGENCY DEPT    EMERGENCY MEDICINE      Pt Name: Karyna Branch  MRN: [id number]  Birthdate 1970  Date of evaluation: 7/28/2024  Treating Physician: Ata Hodges DO    CHIEF COMPLAINT       Chief Complaint   Patient presents with    Nausea    Diarrhea     History obtained from chart review and the patient.    HISTORY OF PRESENT ILLNESS    HPI    Karyna Branch is a 54 y.o. female presents to the emergency department for evaluation of lower quadrant cramping abdominal pain, nausea, vomiting and diarrhea.  Patient reports this is been worsening over the last 4 to 5 days.  Family member at home with similar symptoms who has suspected Giardia infection after having a new dog at home.  Patient began having symptoms a couple of days after helping out a family member.  No bloody stools.  Has having to wear diapers at home secondary to the frequent bowel movements.  No fevers, chills or new rashes.  No abnormal menses.  Urinating appropriately.  Mild difficulty keeping down her home medications secondary to the nausea and vomiting.  Is not having any associated shortness of breath or chest pain.    History of Isak-en-Y gastric bypass    The patient has no other acute complaints at this time.      PAST MEDICAL AND SURGICAL HISTORY     Past Medical History:   Diagnosis Date    Acid reflux 2012    COVID     Hypertensive disorder 5/11/2017    Migraine headache with aura 2002    MS (multiple sclerosis) (HCC) 2002    Raynaud disease 1983    Vitamin B 12 deficiency 2000    Vitamin D deficiency 2012       Past Surgical History:   Procedure Laterality Date    APPENDECTOMY  11/2018    also had right ovary removed begn    CHOLECYSTECTOMY  2002    COLONOSCOPY  01/2012    COLONOSCOPY      every 5 years     EYE SURGERY  11/25/2022    removal of cactus needle    GASTRIC BYPASS SURGERY  2000    HEMORRHOID SURGERY      banding 2012    AVIS STEROTACTIC LOC BREAST BIOPSY LEFT Left 12/13/2012    Benign    SHOULDER

## 2024-07-28 NOTE — ED NOTES
Pt medicated per MAR. Vitals reevaluated at this time. Respirations even and unlabored. Call light within reach. Denies further needs.

## 2024-07-28 NOTE — ED NOTES
Pt medicated per MAR. Vitals reevaluated at this time. Respirations even and unlabored. Denies further needs. Call light within reach.

## 2024-07-29 LAB
BACTERIA UR CULT: ABNORMAL
ORGANISM: ABNORMAL

## 2024-07-30 LAB
G LAMBLIA AG STL QL: NEGATIVE
SPECIMEN SOURCE: NORMAL

## 2024-08-02 LAB
BACTERIA STL CULT: ABNORMAL
ORGANISM: ABNORMAL

## 2024-08-23 ENCOUNTER — HOSPITAL ENCOUNTER (OUTPATIENT)
Dept: INFUSION THERAPY | Age: 54
End: 2024-08-23

## 2024-08-28 ENCOUNTER — OFFICE VISIT (OUTPATIENT)
Dept: FAMILY MEDICINE CLINIC | Age: 54
End: 2024-08-28

## 2024-08-28 VITALS
HEART RATE: 76 BPM | TEMPERATURE: 98.7 F | WEIGHT: 271.2 LBS | RESPIRATION RATE: 18 BRPM | DIASTOLIC BLOOD PRESSURE: 80 MMHG | OXYGEN SATURATION: 98 % | SYSTOLIC BLOOD PRESSURE: 128 MMHG | BODY MASS INDEX: 38.82 KG/M2 | HEIGHT: 70 IN

## 2024-08-28 DIAGNOSIS — J45.909 ASTHMA, UNSPECIFIED ASTHMA SEVERITY, UNSPECIFIED WHETHER COMPLICATED, UNSPECIFIED WHETHER PERSISTENT: ICD-10-CM

## 2024-08-28 DIAGNOSIS — J30.2 SEASONAL ALLERGIES: ICD-10-CM

## 2024-08-28 DIAGNOSIS — I10 PRIMARY HYPERTENSION: ICD-10-CM

## 2024-08-28 DIAGNOSIS — Z12.31 SCREENING MAMMOGRAM FOR BREAST CANCER: ICD-10-CM

## 2024-08-28 DIAGNOSIS — R33.9 URINARY RETENTION WITH INCOMPLETE BLADDER EMPTYING: Primary | ICD-10-CM

## 2024-08-28 DIAGNOSIS — Z11.59 NEED FOR HEPATITIS C SCREENING TEST: ICD-10-CM

## 2024-08-28 DIAGNOSIS — Z11.4 SCREENING FOR HIV (HUMAN IMMUNODEFICIENCY VIRUS): ICD-10-CM

## 2024-08-28 RX ORDER — ATENOLOL AND CHLORTHALIDONE TABLET 50; 25 MG/1; MG/1
1 TABLET ORAL DAILY
Qty: 90 TABLET | Refills: 1 | Status: SHIPPED | OUTPATIENT
Start: 2024-08-28 | End: 2025-02-24

## 2024-08-28 RX ORDER — SUMATRIPTAN 100 MG/1
100 TABLET, FILM COATED ORAL PRN
COMMUNITY

## 2024-08-28 RX ORDER — MONTELUKAST SODIUM 10 MG/1
10 TABLET ORAL DAILY
Qty: 30 TABLET | Refills: 3 | Status: SHIPPED | OUTPATIENT
Start: 2024-08-28

## 2024-08-28 RX ORDER — NAPROXEN 500 MG/1
500 TABLET ORAL AS NEEDED
COMMUNITY

## 2024-08-28 NOTE — PROGRESS NOTES
S: 54 y.o. female with   Chief Complaint   Patient presents with    Follow-up     Office visit.      Reviewed hx and ROS in detail with resident prior to exam.  See notes for additional details.     BP Readings from Last 3 Encounters:   08/28/24 128/80   07/28/24 (!) 154/93   07/16/24 (!) 149/40     Wt Readings from Last 3 Encounters:   08/28/24 123 kg (271 lb 3.2 oz)   07/28/24 117.9 kg (260 lb)   07/16/24 123.7 kg (272 lb 9.6 oz)       O: VS:  height is 1.778 m (5' 10\") and weight is 123 kg (271 lb 3.2 oz). Her oral temperature is 98.7 °F (37.1 °C). Her blood pressure is 128/80 and her pulse is 76. Her respiration is 18 and oxygen saturation is 98%.      Diagnosis Orders   1. Urinary retention with incomplete bladder emptying        2. Screening mammogram for breast cancer        3. Need for hepatitis C screening test        4. Screening for HIV (human immunodeficiency virus)        5. Asthma, unspecified asthma severity, unspecified whether complicated, unspecified whether persistent        6. Seasonal allergies        7. Primary hypertension            Health Maintenance Due   Topic Date Due    HIV screen  Never done    Hepatitis C screen  Never done    Hepatitis B vaccine (1 of 3 - 19+ 3-dose series) Never done    Shingles vaccine (2 of 2) 04/30/2020    DTaP/Tdap/Td vaccine (2 - Td or Tdap) 10/19/2022    COVID-19 Vaccine (1 - 2023-24 season) Never done    Flu vaccine (1) 08/01/2024         Attending Physician Statement  I have discussed the case, including pertinent history and exam findings with the resident. I also have seen the patient and performed key portions of the examination.  I agree with the documented assessment and plan as documented by the resident.  GE modifier added to this encounter      Katelyn Ann Leopold, MD 8/28/2024 4:38 PM

## 2024-08-28 NOTE — PROGRESS NOTES
Patient:aKryna Branch  YOB: 1970  MRN: [id number]    Subjective   54 y.o. female who presents for the following: Follow-up (Office visit. )    Patient has some subjective complaints of urinary retention; would like referral for urology.    Hypertension  This is a chronic problem. The current episode started more than 1 year ago. The problem is unchanged. The problem is controlled. Pertinent negatives include no chest pain, headaches, neck pain, palpitations or shortness of breath. Risk factors for coronary artery disease include obesity, stress and post-menopausal state. Past treatments include beta blockers and diuretics. The current treatment provides significant improvement. There is no history of kidney disease, CAD/MI, CVA or heart failure.   Medication Refill  This is a recurrent problem. Pertinent negatives include no abdominal pain, chest pain, chills, coughing, fatigue, fever, headaches, nausea, neck pain, rash, vomiting or weakness.     Review of Systems   Review of Systems   Constitutional:  Negative for activity change, appetite change, chills, fatigue and fever.   HENT:  Negative for dental problem, hearing loss and sneezing.    Eyes:  Negative for photophobia and visual disturbance.   Respiratory:  Negative for cough, shortness of breath and wheezing.    Cardiovascular:  Negative for chest pain, palpitations and leg swelling.   Gastrointestinal:  Negative for abdominal pain, blood in stool, constipation, diarrhea, nausea and vomiting.   Genitourinary:  Positive for difficulty urinating (concern for urinary rention/incomplete voiding). Negative for dysuria and hematuria.   Musculoskeletal:  Negative for gait problem, neck pain and neck stiffness.   Skin:  Negative for pallor, rash and wound.   Allergic/Immunologic: Negative for immunocompromised state.   Neurological:  Negative for dizziness, seizures, weakness and headaches.   Hematological:  Does not bruise/bleed easily.  Study With Bronchodilator; Future  6. Seasonal allergies  -     montelukast (SINGULAIR) 10 MG tablet; Take 1 tablet by mouth daily, Disp-30 tablet, R-3Normal  7. Primary hypertension  -     atenolol-chlorthalidone (TENORETIC) 50-25 MG per tablet; Take 1 tablet by mouth daily Hasnt taken for 2 weeks/ patient checking daily and has been okay, Disp-90 tablet, R-1Normal    Medical Decision Making: Medication refills as above. BP stable today. HTN is Stable, chronic condition at goal on current regimen; will continue current therapies.  Repeat PFT for asthma hx. Ordered US bladder and referral for Urology for patient.    Preventative Care Gaps: Screening Mammography and HIV/HepC screening  Patient Education Reviewed: Disease process and treatment, Prevention, detection, and treatment of acute complications, Prevention, detection, and treatment of chronic complications    Future Appointments   Date Time Provider Department Center   12/17/2024 10:40 AM Mariel Walker PA-C [facility]     Safety Precautions: not applicable    Return in about 3 months (around 11/28/2024).      An electronic signature was used to authenticate this note  - Thor Reddy, DO PGY-3 on 8/28/2024 at 4:41 PM

## 2024-08-30 ENCOUNTER — HOSPITAL ENCOUNTER (OUTPATIENT)
Dept: INFUSION THERAPY | Age: 54
End: 2024-08-30

## 2024-08-30 ASSESSMENT — ENCOUNTER SYMPTOMS
PHOTOPHOBIA: 0
ABDOMINAL PAIN: 0
DIARRHEA: 0
COUGH: 0
CONSTIPATION: 0
WHEEZING: 0
NAUSEA: 0
VOMITING: 0
SHORTNESS OF BREATH: 0
BLOOD IN STOOL: 0

## 2024-09-06 ENCOUNTER — HOSPITAL ENCOUNTER (OUTPATIENT)
Dept: INFUSION THERAPY | Age: 54
End: 2024-09-06

## 2024-09-19 ENCOUNTER — OFFICE VISIT (OUTPATIENT)
Dept: UROLOGY | Age: 54
End: 2024-09-19
Payer: COMMERCIAL

## 2024-09-19 ENCOUNTER — HOSPITAL ENCOUNTER (OUTPATIENT)
Age: 54
Discharge: HOME | End: 2024-09-19
Payer: COMMERCIAL

## 2024-09-19 VITALS — RESPIRATION RATE: 18 BRPM | BODY MASS INDEX: 39.37 KG/M2 | HEIGHT: 70 IN | WEIGHT: 275 LBS

## 2024-09-19 DIAGNOSIS — Z11.59 NEED FOR HEPATITIS C SCREENING TEST: ICD-10-CM

## 2024-09-19 DIAGNOSIS — R33.9 URINARY RETENTION: Primary | ICD-10-CM

## 2024-09-19 DIAGNOSIS — Z11.4 SCREENING FOR HIV (HUMAN IMMUNODEFICIENCY VIRUS): ICD-10-CM

## 2024-09-19 LAB
BILIRUBIN, URINE: NEGATIVE
BLOOD URINE, POC: NEGATIVE
CHARACTER, URINE: CLEAR
COLOR, UA: YELLOW
GLUCOSE URINE: NEGATIVE MG/DL
HCV IGG SERPL QL IA: NEGATIVE
HIV 1+2 AB+HIV1 P24 AG SERPL QL IA: NORMAL
KETONES, URINE: NEGATIVE
LEUKOCYTE CLUMPS, URINE: NEGATIVE
NITRITE, URINE: NEGATIVE
PH, URINE: 6.5 (ref 5–9)
POST VOID RESIDUAL (PVR): 27 ML
PROTEIN, URINE: NEGATIVE MG/DL
SPECIFIC GRAVITY UA: 1.02 (ref 1–1.03)
UROBILINOGEN, URINE: 0.2 EU/DL (ref 0–1)

## 2024-09-19 PROCEDURE — 81003 URINALYSIS AUTO W/O SCOPE: CPT | Performed by: NURSE PRACTITIONER

## 2024-09-19 PROCEDURE — 51798 US URINE CAPACITY MEASURE: CPT | Performed by: NURSE PRACTITIONER

## 2024-09-19 PROCEDURE — 86803 HEPATITIS C AB TEST: CPT

## 2024-09-19 PROCEDURE — 87389 HIV-1 AG W/HIV-1&-2 AB AG IA: CPT

## 2024-09-19 PROCEDURE — 36415 COLL VENOUS BLD VENIPUNCTURE: CPT

## 2024-09-19 PROCEDURE — 99204 OFFICE O/P NEW MOD 45 MIN: CPT | Performed by: NURSE PRACTITIONER

## 2024-09-19 ASSESSMENT — ENCOUNTER SYMPTOMS
VOMITING: 0
BACK PAIN: 0
NAUSEA: 0
ABDOMINAL PAIN: 1

## 2024-09-23 ENCOUNTER — TELEPHONE (OUTPATIENT)
Dept: FAMILY MEDICINE CLINIC | Age: 54
End: 2024-09-23

## 2024-09-26 ENCOUNTER — TELEPHONE (OUTPATIENT)
Dept: UROLOGY | Age: 54
End: 2024-09-26

## 2024-10-02 ENCOUNTER — HOSPITAL ENCOUNTER (OUTPATIENT)
Dept: PULMONOLOGY | Age: 54
Discharge: HOME | End: 2024-10-02
Payer: COMMERCIAL

## 2024-10-02 ENCOUNTER — APPOINTMENT (OUTPATIENT)
Dept: ULTRASOUND IMAGING | Age: 54
End: 2024-10-02
Payer: COMMERCIAL

## 2024-10-02 DIAGNOSIS — J45.909 ASTHMA, UNSPECIFIED ASTHMA SEVERITY, UNSPECIFIED WHETHER COMPLICATED, UNSPECIFIED WHETHER PERSISTENT: ICD-10-CM

## 2024-10-02 PROCEDURE — 94729 DIFFUSING CAPACITY: CPT

## 2024-10-02 PROCEDURE — 94726 PLETHYSMOGRAPHY LUNG VOLUMES: CPT

## 2024-10-02 PROCEDURE — 94060 EVALUATION OF WHEEZING: CPT

## 2024-10-08 NOTE — PLAN OF CARE
Problem: Musculor/Skeletal Functional Status  Intervention: Fall precautions  Patient aware of fall precautions for here and at home -call light in reach while here     Goal: Absence of falls  Outcome: Met This Shift  No falls this admission    Problem: Intellectual/Education/Knowledge Deficit  Intervention: Verbal/written education provided  Discharge instruction sheets     Goal: Teaching initiated upon admission  Outcome: Met This Shift  Reviewed venofer  with patient, patient offered no questions or concerns. Patient verbalized understanding of drug being administered. Goal: Written Disposition Instruction form completed  Outcome: Met This Shift  Discharge instructions given and reviewed with patient. All questions answered. Patient verbalized understanding    Problem: Discharge Planning  Intervention: Interaction with patient/family and care team  Patient currently denies any needs or concerns   Intervention: Discharge to appropriate level of care  Discharge home    Goal: Knowledge of discharge instructions  Knowledge of discharge instructions    Outcome: Met This Shift  Patient and family member able to teach back follow up appointments and when to call the doctor. Patient offers no questions at this time    Comments: Care plan reviewed with patient and she verbalized understanding of the plan of care and contributed to goal setting. No

## 2024-10-22 ENCOUNTER — LAB (OUTPATIENT)
Dept: LAB | Age: 54
End: 2024-10-22

## 2024-10-22 ENCOUNTER — TELEPHONE (OUTPATIENT)
Dept: ONCOLOGY | Age: 54
End: 2024-10-22

## 2024-10-22 DIAGNOSIS — E61.1 IRON DEFICIENCY: ICD-10-CM

## 2024-10-22 DIAGNOSIS — E61.1 IRON DEFICIENCY: Primary | ICD-10-CM

## 2024-10-22 LAB
BASOPHILS ABSOLUTE: 0.1 THOU/MM3 (ref 0–0.1)
BASOPHILS NFR BLD AUTO: 0.9 %
DEPRECATED RDW RBC AUTO: 44.1 FL (ref 35–45)
EOSINOPHIL NFR BLD AUTO: 2.1 %
EOSINOPHILS ABSOLUTE: 0.1 THOU/MM3 (ref 0–0.4)
ERYTHROCYTE [DISTWIDTH] IN BLOOD BY AUTOMATED COUNT: 14.4 % (ref 11.5–14.5)
FERRITIN SERPL IA-MCNC: 41 NG/ML (ref 10–291)
FOLATE SERPL-MCNC: > 20 NG/ML (ref 4.8–24.2)
HCT VFR BLD AUTO: 39.1 % (ref 37–47)
HGB BLD-MCNC: 12.9 GM/DL (ref 12–16)
IMM GRANULOCYTES # BLD AUTO: 0.02 THOU/MM3 (ref 0–0.07)
IMM GRANULOCYTES NFR BLD AUTO: 0.4 %
IRON SATN MFR SERPL: 12 % (ref 20–50)
IRON SERPL-MCNC: 43 UG/DL (ref 50–170)
LYMPHOCYTES ABSOLUTE: 0.9 THOU/MM3 (ref 1–4.8)
LYMPHOCYTES NFR BLD AUTO: 15.7 %
MCH RBC QN AUTO: 28 PG (ref 26–33)
MCHC RBC AUTO-ENTMCNC: 33 GM/DL (ref 32.2–35.5)
MCV RBC AUTO: 84.8 FL (ref 81–99)
MONOCYTES ABSOLUTE: 0.6 THOU/MM3 (ref 0.4–1.3)
MONOCYTES NFR BLD AUTO: 10.2 %
NEUTROPHILS ABSOLUTE: 4 THOU/MM3 (ref 1.8–7.7)
NEUTROPHILS NFR BLD AUTO: 70.7 %
NRBC BLD AUTO-RTO: 0 /100 WBC
PLATELET # BLD AUTO: 320 THOU/MM3 (ref 130–400)
PMV BLD AUTO: 9.9 FL (ref 9.4–12.4)
RBC # BLD AUTO: 4.61 MILL/MM3 (ref 4.2–5.4)
TIBC SERPL-MCNC: 360 UG/DL (ref 171–450)
VIT B12 SERPL-MCNC: 543 PG/ML (ref 211–911)
WBC # BLD AUTO: 5.7 THOU/MM3 (ref 4.8–10.8)

## 2024-10-22 NOTE — TELEPHONE ENCOUNTER
Patient of Mariel's called wondering about the IV iron situation, advised her we have no up sated at this time patient. Patient stated she is very lethargic and feels like she can not do anything though the day with out needing to rest frequently. Patient wondering if there is any thing else we can do at this time. Wondering if she should have her labs re checked or if we can try to approve a different type of ion

## 2024-10-23 RX ORDER — HEPARIN SODIUM (PORCINE) LOCK FLUSH IV SOLN 100 UNIT/ML 100 UNIT/ML
500 SOLUTION INTRAVENOUS PRN
OUTPATIENT
Start: 2024-10-23

## 2024-10-23 RX ORDER — SODIUM CHLORIDE 9 MG/ML
5-250 INJECTION, SOLUTION INTRAVENOUS PRN
OUTPATIENT
Start: 2024-10-23

## 2024-10-23 RX ORDER — ACETAMINOPHEN 325 MG/1
650 TABLET ORAL
OUTPATIENT
Start: 2024-10-23

## 2024-10-23 RX ORDER — FAMOTIDINE 10 MG/ML
20 INJECTION, SOLUTION INTRAVENOUS
OUTPATIENT
Start: 2024-10-23

## 2024-10-23 RX ORDER — EPINEPHRINE 1 MG/ML
0.3 INJECTION, SOLUTION, CONCENTRATE INTRAVENOUS PRN
OUTPATIENT
Start: 2024-10-23

## 2024-10-23 RX ORDER — DIPHENHYDRAMINE HYDROCHLORIDE 50 MG/ML
50 INJECTION INTRAMUSCULAR; INTRAVENOUS
OUTPATIENT
Start: 2024-10-23

## 2024-10-23 RX ORDER — SODIUM CHLORIDE 0.9 % (FLUSH) 0.9 %
5-40 SYRINGE (ML) INJECTION PRN
OUTPATIENT
Start: 2024-10-23

## 2024-10-23 RX ORDER — ALBUTEROL SULFATE 90 UG/1
4 INHALANT RESPIRATORY (INHALATION) PRN
OUTPATIENT
Start: 2024-10-23

## 2024-10-23 RX ORDER — ONDANSETRON 2 MG/ML
8 INJECTION INTRAMUSCULAR; INTRAVENOUS
OUTPATIENT
Start: 2024-10-23

## 2024-10-23 RX ORDER — SODIUM CHLORIDE 9 MG/ML
INJECTION, SOLUTION INTRAVENOUS CONTINUOUS
OUTPATIENT
Start: 2024-10-23

## 2024-10-23 NOTE — PROGRESS NOTES
Iron levels are low at 43, iron sat low at 12%.  Venofer remains on backorder.  Pt agreeable to different form of IV iron.  Placed orders for IV injectafer.     Electronically signed by RAMÓN Dc CNP on 10/23/2024 at 2:22 PM

## 2024-10-24 ENCOUNTER — HOSPITAL ENCOUNTER (OUTPATIENT)
Dept: UROLOGY | Age: 54
Discharge: HOME | End: 2024-10-24
Payer: COMMERCIAL

## 2024-10-24 VITALS
RESPIRATION RATE: 16 BRPM | TEMPERATURE: 97.5 F | DIASTOLIC BLOOD PRESSURE: 97 MMHG | WEIGHT: 279.1 LBS | BODY MASS INDEX: 39.96 KG/M2 | HEART RATE: 65 BPM | OXYGEN SATURATION: 99 % | HEIGHT: 70 IN | SYSTOLIC BLOOD PRESSURE: 155 MMHG

## 2024-10-24 LAB
BILIRUBIN, URINE: ABNORMAL
BLOOD URINE, POC: NEGATIVE
CHARACTER, URINE: CLEAR
COLOR, UA: YELLOW
GLUCOSE URINE: NEGATIVE MG/DL
KETONES, URINE: ABNORMAL
LEUKOCYTE CLUMPS, URINE: ABNORMAL
NITRITE, URINE: NEGATIVE
PH, URINE: 7 (ref 5–9)
PROTEIN, URINE: 30 MG/DL
SPECIFIC GRAVITY UA: 1.02 (ref 1–1.03)
UROBILINOGEN, URINE: 0.2 EU/DL (ref 0–1)

## 2024-10-24 PROCEDURE — 52000 CYSTOURETHROSCOPY: CPT

## 2024-10-24 RX ORDER — VIBEGRON 75 MG/1
75 TABLET, FILM COATED ORAL DAILY
Qty: 90 EACH | Refills: 3 | Status: SHIPPED | OUTPATIENT
Start: 2024-10-24 | End: 2025-01-22

## 2024-10-24 ASSESSMENT — PAIN - FUNCTIONAL ASSESSMENT: PAIN_FUNCTIONAL_ASSESSMENT: NONE - DENIES PAIN

## 2024-10-24 NOTE — PROGRESS NOTES
[facility] for [facility].  This procedure has been fully reviewed with the patient and written informed consent has been obtained.  1351 Procedure started with   1352 Procedure completed; patient tolerated well.  Dr. Rodriguez talked to patient in length about procedure findings.   Patient discharged.    PLAN  Gemtesa 75 mg sent to pharmacy  F/u karlos 3 mo - office will call to schedule

## 2024-10-24 NOTE — DISCHARGE INSTRUCTIONS
Discharge instructions: Cystoscopy  You May experience painful urination and see blood in the urine after your procedure.  This should resolve over time.      Pt ok to discharge home in good condition  No heavy lifting, >10 lbs for today  Pt should avoid strenuous activity for today  Pt should walk moderately at home  Pt ok to shower   Pt may resume diet as tolerated  Please call attending physician or hospital  with questions  Call or Present to ED if fever (> 101F), intractable nausea vomiting or pain.    Pt should follow up with Charissa DAMON in 3 months, office will call to schedule. Begin taking Gemtesa - sent to pharmacy.

## 2024-10-30 RX ORDER — FAMOTIDINE 10 MG/ML
20 INJECTION, SOLUTION INTRAVENOUS
OUTPATIENT
Start: 2024-10-30

## 2024-10-30 RX ORDER — ALBUTEROL SULFATE 90 UG/1
4 INHALANT RESPIRATORY (INHALATION) PRN
Status: CANCELLED | OUTPATIENT
Start: 2024-10-30

## 2024-10-30 RX ORDER — ACETAMINOPHEN 325 MG/1
650 TABLET ORAL
OUTPATIENT
Start: 2024-10-30

## 2024-10-30 RX ORDER — ONDANSETRON 2 MG/ML
8 INJECTION INTRAMUSCULAR; INTRAVENOUS
Status: CANCELLED | OUTPATIENT
Start: 2024-10-30

## 2024-10-30 RX ORDER — SODIUM CHLORIDE 9 MG/ML
5-250 INJECTION, SOLUTION INTRAVENOUS PRN
Status: CANCELLED | OUTPATIENT
Start: 2024-10-30

## 2024-10-30 RX ORDER — SODIUM CHLORIDE 9 MG/ML
INJECTION, SOLUTION INTRAVENOUS CONTINUOUS
Status: CANCELLED | OUTPATIENT
Start: 2024-10-30

## 2024-10-30 RX ORDER — ONDANSETRON 2 MG/ML
8 INJECTION INTRAMUSCULAR; INTRAVENOUS
OUTPATIENT
Start: 2024-10-30

## 2024-10-30 RX ORDER — HEPARIN SODIUM (PORCINE) LOCK FLUSH IV SOLN 100 UNIT/ML 100 UNIT/ML
500 SOLUTION INTRAVENOUS PRN
OUTPATIENT
Start: 2024-10-30

## 2024-10-30 RX ORDER — SODIUM CHLORIDE 0.9 % (FLUSH) 0.9 %
5-40 SYRINGE (ML) INJECTION PRN
Status: CANCELLED | OUTPATIENT
Start: 2024-10-30

## 2024-10-30 RX ORDER — HEPARIN SODIUM (PORCINE) LOCK FLUSH IV SOLN 100 UNIT/ML 100 UNIT/ML
500 SOLUTION INTRAVENOUS PRN
Status: CANCELLED | OUTPATIENT
Start: 2024-10-30

## 2024-10-30 RX ORDER — SODIUM CHLORIDE 9 MG/ML
INJECTION, SOLUTION INTRAVENOUS CONTINUOUS
OUTPATIENT
Start: 2024-10-30

## 2024-10-30 RX ORDER — DIPHENHYDRAMINE HYDROCHLORIDE 50 MG/ML
50 INJECTION INTRAMUSCULAR; INTRAVENOUS
OUTPATIENT
Start: 2024-10-30

## 2024-10-30 RX ORDER — SODIUM CHLORIDE 9 MG/ML
5-250 INJECTION, SOLUTION INTRAVENOUS PRN
OUTPATIENT
Start: 2024-10-30

## 2024-10-30 RX ORDER — ALBUTEROL SULFATE 90 UG/1
4 INHALANT RESPIRATORY (INHALATION) PRN
OUTPATIENT
Start: 2024-10-30

## 2024-10-30 RX ORDER — FAMOTIDINE 10 MG/ML
20 INJECTION, SOLUTION INTRAVENOUS
Status: CANCELLED | OUTPATIENT
Start: 2024-10-30

## 2024-10-30 RX ORDER — SODIUM CHLORIDE 0.9 % (FLUSH) 0.9 %
5-40 SYRINGE (ML) INJECTION PRN
OUTPATIENT
Start: 2024-10-30

## 2024-10-30 RX ORDER — DIPHENHYDRAMINE HYDROCHLORIDE 50 MG/ML
50 INJECTION INTRAMUSCULAR; INTRAVENOUS
Status: CANCELLED | OUTPATIENT
Start: 2024-10-30

## 2024-10-30 RX ORDER — ACETAMINOPHEN 325 MG/1
650 TABLET ORAL
Status: CANCELLED | OUTPATIENT
Start: 2024-10-30

## 2024-11-08 ENCOUNTER — HOSPITAL ENCOUNTER (OUTPATIENT)
Dept: INFUSION THERAPY | Age: 54
Discharge: HOME | End: 2024-11-08
Payer: COMMERCIAL

## 2024-11-08 VITALS
TEMPERATURE: 97.9 F | DIASTOLIC BLOOD PRESSURE: 77 MMHG | HEART RATE: 65 BPM | OXYGEN SATURATION: 95 % | SYSTOLIC BLOOD PRESSURE: 140 MMHG | RESPIRATION RATE: 18 BRPM

## 2024-11-08 DIAGNOSIS — E61.1 IRON DEFICIENCY: ICD-10-CM

## 2024-11-08 DIAGNOSIS — Z98.84 HISTORY OF GASTRIC BYPASS: Primary | ICD-10-CM

## 2024-11-08 DIAGNOSIS — K90.9 MALABSORPTION OF IRON: ICD-10-CM

## 2024-11-08 PROCEDURE — 6360000002 HC RX W HCPCS: Performed by: NURSE PRACTITIONER

## 2024-11-08 PROCEDURE — 2580000003 HC RX 258: Performed by: NURSE PRACTITIONER

## 2024-11-08 PROCEDURE — 96365 THER/PROPH/DIAG IV INF INIT: CPT

## 2024-11-08 RX ORDER — HEPARIN 100 UNIT/ML
500 SYRINGE INTRAVENOUS PRN
Status: CANCELLED | OUTPATIENT
Start: 2024-11-15

## 2024-11-08 RX ORDER — ALBUTEROL SULFATE 90 UG/1
4 INHALANT RESPIRATORY (INHALATION) PRN
Status: CANCELLED | OUTPATIENT
Start: 2024-11-15

## 2024-11-08 RX ORDER — ACETAMINOPHEN 325 MG/1
650 TABLET ORAL
Status: CANCELLED | OUTPATIENT
Start: 2024-11-15

## 2024-11-08 RX ORDER — SODIUM CHLORIDE 9 MG/ML
5-250 INJECTION, SOLUTION INTRAVENOUS PRN
Status: CANCELLED | OUTPATIENT
Start: 2024-11-15

## 2024-11-08 RX ORDER — SODIUM CHLORIDE 0.9 % (FLUSH) 0.9 %
5-40 SYRINGE (ML) INJECTION PRN
Status: CANCELLED | OUTPATIENT
Start: 2024-11-15

## 2024-11-08 RX ORDER — SODIUM CHLORIDE 9 MG/ML
5-250 INJECTION, SOLUTION INTRAVENOUS PRN
Status: DISCONTINUED | OUTPATIENT
Start: 2024-11-08 | End: 2024-11-09 | Stop reason: HOSPADM

## 2024-11-08 RX ORDER — DIPHENHYDRAMINE HYDROCHLORIDE 50 MG/ML
50 INJECTION INTRAMUSCULAR; INTRAVENOUS
Status: CANCELLED | OUTPATIENT
Start: 2024-11-15

## 2024-11-08 RX ORDER — SODIUM CHLORIDE 9 MG/ML
INJECTION, SOLUTION INTRAVENOUS CONTINUOUS
Status: CANCELLED | OUTPATIENT
Start: 2024-11-15

## 2024-11-08 RX ORDER — ONDANSETRON 2 MG/ML
8 INJECTION INTRAMUSCULAR; INTRAVENOUS
Status: CANCELLED | OUTPATIENT
Start: 2024-11-15

## 2024-11-08 RX ADMIN — SODIUM CHLORIDE 125 MG: 9 INJECTION, SOLUTION INTRAVENOUS at 11:12

## 2024-11-08 NOTE — PLAN OF CARE
Problem: Safety - Adult  Goal: Free from fall injury  Outcome: Adequate for Discharge  Flowsheets (Taken 11/8/2024 1404)  Free From Fall Injury: Instruct family/caregiver on patient safety     Problem: Discharge Planning  Goal: Discharge to home or other facility with appropriate resources  Outcome: Adequate for Discharge  Flowsheets (Taken 11/8/2024 1404)  Discharge to home or other facility with appropriate resources: Identify barriers to discharge with patient and caregiver     Problem: Chronic Conditions and Co-morbidities  Goal: Patient's chronic conditions and co-morbidity symptoms are monitored and maintained or improved  Outcome: Adequate for Discharge  Flowsheets (Taken 11/8/2024 1404)  Care Plan - Patient's Chronic Conditions and Co-Morbidity Symptoms are Monitored and Maintained or Improved:   Monitor and assess patient's chronic conditions and comorbid symptoms for stability, deterioration, or improvement   Collaborate with multidisciplinary team to address chronic and comorbid conditions and prevent exacerbation or deterioration  Note: Patient verbalizes understanding to verbal information given on Ferrlecit,action and possible side effects. Aware to call MD if develop complications.      Care plan reviewed with patient. Patient verbalizes understanding of the plan of care and contributes to goal setting.

## 2024-11-08 NOTE — PROGRESS NOTES
Patient tolerated Ferrlecit without any complications.  Denies dizziness, lightheadedness, acute nausea or vomiting, headache, heart palpitations, rash/itching or increased SOB.  Discussed monitoring and reporting temperature of 100.4 or greater to our office [phone] or going directly to Emergency Dept.    Last vital signs  BP (!) 140/77   Pulse 65   Temp 97.9 °F (36.6 °C) (Oral)   Resp 18   SpO2 95%     Patient instructed if they experience any of the above symptoms following today's visit, he/she is to notify the Physician or go to the Emergency Dept.    Discharge instructions given to patient, Verbalizes understanding. Ambulated off unit per self in stable condition with all belongings.

## 2024-11-15 ENCOUNTER — TELEPHONE (OUTPATIENT)
Dept: UROLOGY | Age: 54
End: 2024-11-15

## 2024-11-15 ENCOUNTER — HOSPITAL ENCOUNTER (OUTPATIENT)
Dept: INFUSION THERAPY | Age: 54
Discharge: HOME | End: 2024-11-15
Payer: COMMERCIAL

## 2024-11-15 VITALS
HEART RATE: 82 BPM | DIASTOLIC BLOOD PRESSURE: 70 MMHG | TEMPERATURE: 98.1 F | OXYGEN SATURATION: 98 % | RESPIRATION RATE: 16 BRPM | SYSTOLIC BLOOD PRESSURE: 129 MMHG

## 2024-11-15 DIAGNOSIS — Z98.84 HISTORY OF GASTRIC BYPASS: Primary | ICD-10-CM

## 2024-11-15 DIAGNOSIS — E61.1 IRON DEFICIENCY: ICD-10-CM

## 2024-11-15 DIAGNOSIS — K90.9 MALABSORPTION OF IRON: ICD-10-CM

## 2024-11-15 PROCEDURE — 2580000003 HC RX 258: Performed by: NURSE PRACTITIONER

## 2024-11-15 PROCEDURE — 6360000002 HC RX W HCPCS: Performed by: NURSE PRACTITIONER

## 2024-11-15 PROCEDURE — 96375 TX/PRO/DX INJ NEW DRUG ADDON: CPT

## 2024-11-15 PROCEDURE — 96365 THER/PROPH/DIAG IV INF INIT: CPT

## 2024-11-15 RX ORDER — DIPHENHYDRAMINE HYDROCHLORIDE 50 MG/ML
50 INJECTION INTRAMUSCULAR; INTRAVENOUS
Status: CANCELLED | OUTPATIENT
Start: 2024-11-22

## 2024-11-15 RX ORDER — SODIUM CHLORIDE 9 MG/ML
INJECTION, SOLUTION INTRAVENOUS CONTINUOUS
Status: CANCELLED | OUTPATIENT
Start: 2024-11-22

## 2024-11-15 RX ORDER — SODIUM CHLORIDE 0.9 % (FLUSH) 0.9 %
5-40 SYRINGE (ML) INJECTION PRN
Status: DISCONTINUED | OUTPATIENT
Start: 2024-11-15 | End: 2024-11-16 | Stop reason: HOSPADM

## 2024-11-15 RX ORDER — ACETAMINOPHEN 325 MG/1
650 TABLET ORAL
Status: CANCELLED | OUTPATIENT
Start: 2024-11-22

## 2024-11-15 RX ORDER — HYDROCORTISONE SODIUM SUCCINATE 100 MG/2ML
100 INJECTION INTRAMUSCULAR; INTRAVENOUS
Status: CANCELLED | OUTPATIENT
Start: 2024-11-22

## 2024-11-15 RX ORDER — ALBUTEROL SULFATE 90 UG/1
4 INHALANT RESPIRATORY (INHALATION) PRN
Status: CANCELLED | OUTPATIENT
Start: 2024-11-22

## 2024-11-15 RX ORDER — SODIUM CHLORIDE 9 MG/ML
5-250 INJECTION, SOLUTION INTRAVENOUS PRN
Status: CANCELLED | OUTPATIENT
Start: 2024-11-22

## 2024-11-15 RX ORDER — ONDANSETRON 4 MG/1
8 TABLET, FILM COATED ORAL ONCE
Status: CANCELLED
Start: 2024-11-22 | End: 2024-11-22

## 2024-11-15 RX ORDER — HEPARIN 100 UNIT/ML
500 SYRINGE INTRAVENOUS PRN
Status: CANCELLED | OUTPATIENT
Start: 2024-11-22

## 2024-11-15 RX ORDER — ONDANSETRON 2 MG/ML
4 INJECTION INTRAMUSCULAR; INTRAVENOUS ONCE
Status: CANCELLED
Start: 2024-11-15 | End: 2024-11-15

## 2024-11-15 RX ORDER — SODIUM CHLORIDE 0.9 % (FLUSH) 0.9 %
5-40 SYRINGE (ML) INJECTION PRN
Status: CANCELLED | OUTPATIENT
Start: 2024-11-22

## 2024-11-15 RX ORDER — ONDANSETRON 2 MG/ML
4 INJECTION INTRAMUSCULAR; INTRAVENOUS ONCE
Status: CANCELLED
Start: 2024-11-22 | End: 2024-11-22

## 2024-11-15 RX ORDER — ONDANSETRON 2 MG/ML
4 INJECTION INTRAMUSCULAR; INTRAVENOUS ONCE
Status: COMPLETED | OUTPATIENT
Start: 2024-11-15 | End: 2024-11-15

## 2024-11-15 RX ORDER — ONDANSETRON 2 MG/ML
8 INJECTION INTRAMUSCULAR; INTRAVENOUS
Status: CANCELLED | OUTPATIENT
Start: 2024-11-22

## 2024-11-15 RX ORDER — ONDANSETRON 4 MG/1
8 TABLET, FILM COATED ORAL ONCE
Status: DISCONTINUED | OUTPATIENT
Start: 2024-11-15 | End: 2024-11-15 | Stop reason: CLARIF

## 2024-11-15 RX ADMIN — SODIUM CHLORIDE 125 MG: 9 INJECTION, SOLUTION INTRAVENOUS at 10:49

## 2024-11-15 RX ADMIN — ONDANSETRON 4 MG: 2 INJECTION INTRAMUSCULAR; INTRAVENOUS at 10:43

## 2024-11-15 NOTE — PROGRESS NOTES
Patient tolerated  ferrlecit without any complications. Discharge instructions given to patient-verbalizes understanding. Ambulated off unit per self with belongings.

## 2024-11-15 NOTE — PLAN OF CARE
Problem: Safety - Adult  Goal: Free from fall injury  Outcome: Adequate for Discharge  Flowsheets (Taken 11/15/2024 1046)  Free From Fall Injury:   Based on caregiver fall risk screen, instruct family/caregiver to ask for assistance with transferring infant if caregiver noted to have fall risk factors   Instruct family/caregiver on patient safety  Note: Free from falls while in [facility].Discussed the need to use the call light for assistance when getting up to ambulate.        Problem: Discharge Planning  Goal: Discharge to home or other facility with appropriate resources  Outcome: Adequate for Discharge  Flowsheets (Taken 11/15/2024 1046)  Discharge to home or other facility with appropriate resources:   Identify discharge learning needs (meds, wound care, etc)   Identify barriers to discharge with patient and caregiver  Note: Verbalize understanding of discharge instructions, follow up appointments, and when to call Physician.Discuss understanding of discharge instructions, follow up appointments and when to call Physician.        Problem: Chronic Conditions and Co-morbidities  Goal: Patient's chronic conditions and co-morbidity symptoms are monitored and maintained or improved  Outcome: Adequate for Discharge  Flowsheets (Taken 11/15/2024 1046)  Care Plan - Patient's Chronic Conditions and Co-Morbidity Symptoms are Monitored and Maintained or Improved:   Collaborate with multidisciplinary team to address chronic and comorbid conditions and prevent exacerbation or deterioration   Monitor and assess patient's chronic conditions and comorbid symptoms for stability, deterioration, or improvement  Note: Patient verbalizes understanding to verbal information given on ferrlecit,action and possible side effects. Aware to call MD if develop complications.      Care plan reviewed with patient. Patient verbalizes understanding of the plan of care and contributes to goal setting.

## 2024-11-21 RX ORDER — MIRABEGRON 50 MG/1
50 TABLET, FILM COATED, EXTENDED RELEASE ORAL DAILY
Qty: 30 TABLET | Refills: 3 | Status: SHIPPED | OUTPATIENT
Start: 2024-11-21

## 2024-11-21 NOTE — TELEPHONE ENCOUNTER
Script for Myrbetriq sent to pharmacy.  Please let pt know it can take 2-4 weeks to see improvement in symptoms when starting this medication.

## 2024-11-22 ENCOUNTER — HOSPITAL ENCOUNTER (OUTPATIENT)
Dept: INFUSION THERAPY | Age: 54
Discharge: HOME | End: 2024-11-22
Payer: COMMERCIAL

## 2024-11-22 VITALS
DIASTOLIC BLOOD PRESSURE: 75 MMHG | SYSTOLIC BLOOD PRESSURE: 126 MMHG | RESPIRATION RATE: 16 BRPM | TEMPERATURE: 98.1 F | OXYGEN SATURATION: 97 % | HEART RATE: 77 BPM

## 2024-11-22 DIAGNOSIS — K90.9 MALABSORPTION OF IRON: ICD-10-CM

## 2024-11-22 DIAGNOSIS — Z98.84 HISTORY OF GASTRIC BYPASS: Primary | ICD-10-CM

## 2024-11-22 DIAGNOSIS — E61.1 IRON DEFICIENCY: ICD-10-CM

## 2024-11-22 PROCEDURE — 6360000002 HC RX W HCPCS: Performed by: NURSE PRACTITIONER

## 2024-11-22 PROCEDURE — 2580000003 HC RX 258: Performed by: NURSE PRACTITIONER

## 2024-11-22 PROCEDURE — 96375 TX/PRO/DX INJ NEW DRUG ADDON: CPT

## 2024-11-22 PROCEDURE — 96365 THER/PROPH/DIAG IV INF INIT: CPT

## 2024-11-22 RX ORDER — ALBUTEROL SULFATE 90 UG/1
4 INHALANT RESPIRATORY (INHALATION) PRN
OUTPATIENT
Start: 2024-11-29

## 2024-11-22 RX ORDER — DIPHENHYDRAMINE HYDROCHLORIDE 50 MG/ML
50 INJECTION INTRAMUSCULAR; INTRAVENOUS
OUTPATIENT
Start: 2024-11-29

## 2024-11-22 RX ORDER — SODIUM CHLORIDE 9 MG/ML
5-250 INJECTION, SOLUTION INTRAVENOUS PRN
OUTPATIENT
Start: 2024-11-29

## 2024-11-22 RX ORDER — ONDANSETRON 2 MG/ML
4 INJECTION INTRAMUSCULAR; INTRAVENOUS ONCE
Status: COMPLETED | OUTPATIENT
Start: 2024-11-22 | End: 2024-11-22

## 2024-11-22 RX ORDER — ACETAMINOPHEN 325 MG/1
650 TABLET ORAL
OUTPATIENT
Start: 2024-11-29

## 2024-11-22 RX ORDER — ONDANSETRON 2 MG/ML
4 INJECTION INTRAMUSCULAR; INTRAVENOUS ONCE
Start: 2024-11-29 | End: 2024-11-29

## 2024-11-22 RX ORDER — ONDANSETRON 2 MG/ML
8 INJECTION INTRAMUSCULAR; INTRAVENOUS
OUTPATIENT
Start: 2024-11-29

## 2024-11-22 RX ORDER — SODIUM CHLORIDE 9 MG/ML
INJECTION, SOLUTION INTRAVENOUS CONTINUOUS
OUTPATIENT
Start: 2024-11-29

## 2024-11-22 RX ORDER — HEPARIN 100 UNIT/ML
500 SYRINGE INTRAVENOUS PRN
OUTPATIENT
Start: 2024-11-29

## 2024-11-22 RX ORDER — HYDROCORTISONE SODIUM SUCCINATE 100 MG/2ML
100 INJECTION INTRAMUSCULAR; INTRAVENOUS
OUTPATIENT
Start: 2024-11-29

## 2024-11-22 RX ORDER — SODIUM CHLORIDE 0.9 % (FLUSH) 0.9 %
5-40 SYRINGE (ML) INJECTION PRN
OUTPATIENT
Start: 2024-11-29

## 2024-11-22 RX ADMIN — ONDANSETRON 4 MG: 2 INJECTION INTRAMUSCULAR; INTRAVENOUS at 11:11

## 2024-11-22 RX ADMIN — SODIUM CHLORIDE 125 MG: 9 INJECTION, SOLUTION INTRAVENOUS at 11:15

## 2024-11-22 NOTE — PLAN OF CARE
Problem: Safety - Adult  Goal: Free from fall injury  Outcome: Adequate for Discharge  Flowsheets (Taken 11/22/2024 1050)  Free From Fall Injury:   Based on caregiver fall risk screen, instruct family/caregiver to ask for assistance with transferring infant if caregiver noted to have fall risk factors   Instruct family/caregiver on patient safety  Note: Free from falls while in [facility].Discussed the need to use the call light for assistance when getting up to ambulate.        Problem: Discharge Planning  Goal: Discharge to home or other facility with appropriate resources  Outcome: Adequate for Discharge  Flowsheets (Taken 11/22/2024 1050)  Discharge to home or other facility with appropriate resources:   Identify barriers to discharge with patient and caregiver   Identify discharge learning needs (meds, wound care, etc)  Note: Verbalize understanding of discharge instructions, follow up appointments, and when to call Physician.Discuss understanding of discharge instructions, follow up appointments and when to call Physician.        Problem: Chronic Conditions and Co-morbidities  Goal: Patient's chronic conditions and co-morbidity symptoms are monitored and maintained or improved  Outcome: Adequate for Discharge  Flowsheets (Taken 11/22/2024 1050)  Care Plan - Patient's Chronic Conditions and Co-Morbidity Symptoms are Monitored and Maintained or Improved:   Collaborate with multidisciplinary team to address chronic and comorbid conditions and prevent exacerbation or deterioration   Monitor and assess patient's chronic conditions and comorbid symptoms for stability, deterioration, or improvement  Note: Patient verbalizes understanding to verbal information given on ferrlecit,action and possible side effects. Aware to call MD if develop complications.      Care plan reviewed with patient. Patient verbalizes understanding of the plan of care and contributes to goal setting.

## 2024-12-06 ENCOUNTER — HOSPITAL ENCOUNTER (OUTPATIENT)
Dept: INFUSION THERAPY | Age: 54
Discharge: HOME | End: 2024-12-06
Payer: COMMERCIAL

## 2024-12-06 VITALS
DIASTOLIC BLOOD PRESSURE: 78 MMHG | TEMPERATURE: 98 F | SYSTOLIC BLOOD PRESSURE: 148 MMHG | WEIGHT: 282.6 LBS | RESPIRATION RATE: 18 BRPM | OXYGEN SATURATION: 98 % | BODY MASS INDEX: 40.55 KG/M2 | HEART RATE: 78 BPM

## 2024-12-06 DIAGNOSIS — Z98.84 HISTORY OF GASTRIC BYPASS: Primary | ICD-10-CM

## 2024-12-06 DIAGNOSIS — E61.1 IRON DEFICIENCY: ICD-10-CM

## 2024-12-06 DIAGNOSIS — K90.9 MALABSORPTION OF IRON: ICD-10-CM

## 2024-12-06 PROCEDURE — 6360000002 HC RX W HCPCS: Performed by: NURSE PRACTITIONER

## 2024-12-06 PROCEDURE — 96375 TX/PRO/DX INJ NEW DRUG ADDON: CPT

## 2024-12-06 PROCEDURE — 2580000003 HC RX 258: Performed by: NURSE PRACTITIONER

## 2024-12-06 PROCEDURE — 96365 THER/PROPH/DIAG IV INF INIT: CPT

## 2024-12-06 RX ORDER — HEPARIN 100 UNIT/ML
500 SYRINGE INTRAVENOUS PRN
Status: CANCELLED | OUTPATIENT
Start: 2024-12-13

## 2024-12-06 RX ORDER — ALBUTEROL SULFATE 90 UG/1
4 INHALANT RESPIRATORY (INHALATION) PRN
Status: CANCELLED | OUTPATIENT
Start: 2024-12-13

## 2024-12-06 RX ORDER — ONDANSETRON 2 MG/ML
8 INJECTION INTRAMUSCULAR; INTRAVENOUS
Status: CANCELLED | OUTPATIENT
Start: 2024-12-13

## 2024-12-06 RX ORDER — HYDROCORTISONE SODIUM SUCCINATE 100 MG/2ML
100 INJECTION INTRAMUSCULAR; INTRAVENOUS
Status: CANCELLED | OUTPATIENT
Start: 2024-12-13

## 2024-12-06 RX ORDER — SODIUM CHLORIDE 0.9 % (FLUSH) 0.9 %
5-40 SYRINGE (ML) INJECTION PRN
Status: CANCELLED | OUTPATIENT
Start: 2024-12-13

## 2024-12-06 RX ORDER — ACETAMINOPHEN 325 MG/1
650 TABLET ORAL
Status: CANCELLED | OUTPATIENT
Start: 2024-12-13

## 2024-12-06 RX ORDER — ONDANSETRON 2 MG/ML
4 INJECTION INTRAMUSCULAR; INTRAVENOUS ONCE
Status: COMPLETED | OUTPATIENT
Start: 2024-12-06 | End: 2024-12-06

## 2024-12-06 RX ORDER — SODIUM CHLORIDE 9 MG/ML
5-250 INJECTION, SOLUTION INTRAVENOUS PRN
Status: CANCELLED | OUTPATIENT
Start: 2024-12-13

## 2024-12-06 RX ORDER — SODIUM CHLORIDE 9 MG/ML
5-250 INJECTION, SOLUTION INTRAVENOUS PRN
Status: DISCONTINUED | OUTPATIENT
Start: 2024-12-06 | End: 2024-12-07 | Stop reason: HOSPADM

## 2024-12-06 RX ORDER — ONDANSETRON 2 MG/ML
4 INJECTION INTRAMUSCULAR; INTRAVENOUS ONCE
Status: CANCELLED
Start: 2024-12-13 | End: 2024-12-13

## 2024-12-06 RX ORDER — SODIUM CHLORIDE 9 MG/ML
INJECTION, SOLUTION INTRAVENOUS CONTINUOUS
Status: CANCELLED | OUTPATIENT
Start: 2024-12-13

## 2024-12-06 RX ORDER — DIPHENHYDRAMINE HYDROCHLORIDE 50 MG/ML
50 INJECTION INTRAMUSCULAR; INTRAVENOUS
Status: CANCELLED | OUTPATIENT
Start: 2024-12-13

## 2024-12-06 RX ADMIN — SODIUM CHLORIDE 125 MG: 9 INJECTION, SOLUTION INTRAVENOUS at 11:16

## 2024-12-06 RX ADMIN — ONDANSETRON 4 MG: 2 INJECTION INTRAMUSCULAR; INTRAVENOUS at 11:03

## 2024-12-06 NOTE — PROGRESS NOTES
Patient tolerated Ferrlecit without any complications.  Denies dizziness, lightheadedness, acute nausea or vomiting, headache, heart palpitations, rash/itching or increased SOB.  Discussed the importance of monitoring and reporting temperature of 100.4 or greater to our office [phone] or going directly to Emergency Dept.    Last vital signs  BP (!) 148/78   Pulse 78   Temp 98 °F (36.7 °C) (Oral)   Resp 18   Wt 128.2 kg (282 lb 9.6 oz)   SpO2 98%   BMI 40.55 kg/m²     Patient instructed if they experience any of the above symptoms following today's visit, he/she is to notify the Physician or go to the Emergency Dept.    Discharge instructions given to patient, Verbalizes understanding. Ambulated off unit per self in stable condition with all belongings.

## 2024-12-06 NOTE — DISCHARGE INSTRUCTIONS
Please contact your Oncologist if you have any questions regarding the Ferrlecit that you received today.    You are instructed to call the office or go to the Emergency Dept. If you experience any of the following symptoms:    Chills,temperature of 100.4 or higher or any symptoms of infection.  Dizziness/lightheadedness   Acute nausea or vomiting-not relieved by medications  Headaches-not relieved by medications  New chest pain or pressure  New rash /itching  New shortness of breath      Make sure you are drinking 48 to 64 ounces of water daily-if you are unable to drink fluids let us know right away.

## 2024-12-06 NOTE — PLAN OF CARE
Problem: Safety - Adult  Goal: Free from fall injury  Outcome: Adequate for Discharge  Flowsheets (Taken 12/6/2024 1456)  Free From Fall Injury: Instruct family/caregiver on patient safety     Problem: Discharge Planning  Goal: Discharge to home or other facility with appropriate resources  Outcome: Adequate for Discharge  Flowsheets (Taken 12/6/2024 1456)  Discharge to home or other facility with appropriate resources: Identify barriers to discharge with patient and caregiver     Problem: Chronic Conditions and Co-morbidities  Goal: Patient's chronic conditions and co-morbidity symptoms are monitored and maintained or improved  Outcome: Adequate for Discharge  Flowsheets (Taken 12/6/2024 1456)  Care Plan - Patient's Chronic Conditions and Co-Morbidity Symptoms are Monitored and Maintained or Improved:   Monitor and assess patient's chronic conditions and comorbid symptoms for stability, deterioration, or improvement   Collaborate with multidisciplinary team to address chronic and comorbid conditions and prevent exacerbation or deterioration  Note: Patient verbalizes understanding to verbal information given on Ferrlecit,action and possible side effects. Aware to call MD if develop complications.      Care plan reviewed with patient. Patient verbalizes understanding of the plan of care and contributes to goal setting.

## 2024-12-13 ENCOUNTER — HOSPITAL ENCOUNTER (OUTPATIENT)
Dept: INFUSION THERAPY | Age: 54
Discharge: HOME | End: 2024-12-13
Payer: COMMERCIAL

## 2024-12-13 VITALS
HEART RATE: 78 BPM | SYSTOLIC BLOOD PRESSURE: 139 MMHG | RESPIRATION RATE: 18 BRPM | DIASTOLIC BLOOD PRESSURE: 65 MMHG | TEMPERATURE: 97.9 F | OXYGEN SATURATION: 98 %

## 2024-12-13 DIAGNOSIS — Z98.84 HISTORY OF GASTRIC BYPASS: Primary | ICD-10-CM

## 2024-12-13 DIAGNOSIS — K90.9 MALABSORPTION OF IRON: ICD-10-CM

## 2024-12-13 DIAGNOSIS — E61.1 IRON DEFICIENCY: ICD-10-CM

## 2024-12-13 PROCEDURE — 96375 TX/PRO/DX INJ NEW DRUG ADDON: CPT

## 2024-12-13 PROCEDURE — 2580000003 HC RX 258: Performed by: NURSE PRACTITIONER

## 2024-12-13 PROCEDURE — 96365 THER/PROPH/DIAG IV INF INIT: CPT

## 2024-12-13 PROCEDURE — 6360000002 HC RX W HCPCS: Performed by: NURSE PRACTITIONER

## 2024-12-13 RX ORDER — DIPHENHYDRAMINE HYDROCHLORIDE 50 MG/ML
50 INJECTION INTRAMUSCULAR; INTRAVENOUS
Status: CANCELLED | OUTPATIENT
Start: 2024-12-20

## 2024-12-13 RX ORDER — ACETAMINOPHEN 325 MG/1
650 TABLET ORAL
Status: CANCELLED | OUTPATIENT
Start: 2024-12-20

## 2024-12-13 RX ORDER — HEPARIN 100 UNIT/ML
500 SYRINGE INTRAVENOUS PRN
Status: CANCELLED | OUTPATIENT
Start: 2024-12-20

## 2024-12-13 RX ORDER — SODIUM CHLORIDE 9 MG/ML
INJECTION, SOLUTION INTRAVENOUS CONTINUOUS
Status: CANCELLED | OUTPATIENT
Start: 2024-12-20

## 2024-12-13 RX ORDER — ALBUTEROL SULFATE 90 UG/1
4 INHALANT RESPIRATORY (INHALATION) PRN
Status: CANCELLED | OUTPATIENT
Start: 2024-12-20

## 2024-12-13 RX ORDER — SODIUM CHLORIDE 9 MG/ML
5-250 INJECTION, SOLUTION INTRAVENOUS PRN
Status: CANCELLED | OUTPATIENT
Start: 2024-12-20

## 2024-12-13 RX ORDER — HYDROCORTISONE SODIUM SUCCINATE 100 MG/2ML
100 INJECTION INTRAMUSCULAR; INTRAVENOUS
Status: CANCELLED | OUTPATIENT
Start: 2024-12-20

## 2024-12-13 RX ORDER — ONDANSETRON 2 MG/ML
4 INJECTION INTRAMUSCULAR; INTRAVENOUS ONCE
Status: COMPLETED | OUTPATIENT
Start: 2024-12-13 | End: 2024-12-13

## 2024-12-13 RX ORDER — SODIUM CHLORIDE 0.9 % (FLUSH) 0.9 %
5-40 SYRINGE (ML) INJECTION PRN
Status: CANCELLED | OUTPATIENT
Start: 2024-12-20

## 2024-12-13 RX ORDER — ONDANSETRON 2 MG/ML
4 INJECTION INTRAMUSCULAR; INTRAVENOUS ONCE
Status: CANCELLED
Start: 2024-12-20 | End: 2024-12-20

## 2024-12-13 RX ORDER — ONDANSETRON 2 MG/ML
8 INJECTION INTRAMUSCULAR; INTRAVENOUS
Status: CANCELLED | OUTPATIENT
Start: 2024-12-20

## 2024-12-13 RX ADMIN — ONDANSETRON 4 MG: 2 INJECTION INTRAMUSCULAR; INTRAVENOUS at 11:13

## 2024-12-13 RX ADMIN — SODIUM CHLORIDE 125 MG: 9 INJECTION, SOLUTION INTRAVENOUS at 11:20

## 2024-12-13 NOTE — PROGRESS NOTES
Patient tolerated Ferrlecid without any complications.  Denies dizziness, lightheadedness, acute nausea or vomiting, headache, heart palpitations, rash/itching or increased SOB.    Last vital signs  /65   Pulse 78   Temp 97.9 °F (36.6 °C) (Oral)   Resp 18   SpO2 98%     Patient instructed if they experience any of the above symptoms following today's visit, he/she is to notify the Physician or go to the Emergency Dept.    Discharge instructions given to patient, Verbalizes understanding. Ambulated off unit per self in stable condition with all belongings.

## 2024-12-13 NOTE — PLAN OF CARE
Problem: Safety - Adult  Goal: Free from fall injury  Outcome: Adequate for Discharge  Flowsheets (Taken 12/13/2024 1303)  Free From Fall Injury:   Instruct family/caregiver on patient safety   Based on caregiver fall risk screen, instruct family/caregiver to ask for assistance with transferring infant if caregiver noted to have fall risk factors     Problem: Discharge Planning  Goal: Discharge to home or other facility with appropriate resources  Outcome: Adequate for Discharge  Flowsheets (Taken 12/13/2024 1303)  Discharge to home or other facility with appropriate resources:   Identify barriers to discharge with patient and caregiver   Identify discharge learning needs (meds, wound care, etc)     Problem: Chronic Conditions and Co-morbidities  Goal: Patient's chronic conditions and co-morbidity symptoms are monitored and maintained or improved  Outcome: Adequate for Discharge  Flowsheets (Taken 12/13/2024 1303)  Care Plan - Patient's Chronic Conditions and Co-Morbidity Symptoms are Monitored and Maintained or Improved: Monitor and assess patient's chronic conditions and comorbid symptoms for stability, deterioration, or improvement   Care plan reviewed with patient.  Patient verbalize understanding of the plan of care and contribute to goal setting.

## 2024-12-16 ENCOUNTER — HOSPITAL ENCOUNTER (OUTPATIENT)
Dept: WOMENS IMAGING | Age: 54
Discharge: HOME | End: 2024-12-16
Payer: COMMERCIAL

## 2024-12-16 DIAGNOSIS — Z12.31 SCREENING MAMMOGRAM FOR BREAST CANCER: ICD-10-CM

## 2024-12-16 PROCEDURE — 77067 SCR MAMMO BI INCL CAD: CPT

## 2024-12-16 PROCEDURE — 77063 BREAST TOMOSYNTHESIS BI: CPT

## 2024-12-20 ENCOUNTER — HOSPITAL ENCOUNTER (OUTPATIENT)
Dept: INFUSION THERAPY | Age: 54
Discharge: HOME | End: 2024-12-20
Payer: COMMERCIAL

## 2024-12-20 VITALS
TEMPERATURE: 98.1 F | RESPIRATION RATE: 16 BRPM | OXYGEN SATURATION: 100 % | HEART RATE: 82 BPM | DIASTOLIC BLOOD PRESSURE: 60 MMHG | SYSTOLIC BLOOD PRESSURE: 128 MMHG

## 2024-12-20 DIAGNOSIS — E61.1 IRON DEFICIENCY: ICD-10-CM

## 2024-12-20 DIAGNOSIS — Z98.84 HISTORY OF GASTRIC BYPASS: Primary | ICD-10-CM

## 2024-12-20 DIAGNOSIS — K90.9 MALABSORPTION OF IRON: ICD-10-CM

## 2024-12-20 PROCEDURE — 96365 THER/PROPH/DIAG IV INF INIT: CPT

## 2024-12-20 PROCEDURE — 2580000003 HC RX 258: Performed by: NURSE PRACTITIONER

## 2024-12-20 PROCEDURE — 6360000002 HC RX W HCPCS: Performed by: NURSE PRACTITIONER

## 2024-12-20 PROCEDURE — 96375 TX/PRO/DX INJ NEW DRUG ADDON: CPT

## 2024-12-20 RX ORDER — HEPARIN 100 UNIT/ML
500 SYRINGE INTRAVENOUS PRN
Status: CANCELLED | OUTPATIENT
Start: 2024-12-27

## 2024-12-20 RX ORDER — SODIUM CHLORIDE 9 MG/ML
INJECTION, SOLUTION INTRAVENOUS CONTINUOUS
Status: CANCELLED | OUTPATIENT
Start: 2024-12-27

## 2024-12-20 RX ORDER — ALBUTEROL SULFATE 90 UG/1
4 INHALANT RESPIRATORY (INHALATION) PRN
Status: CANCELLED | OUTPATIENT
Start: 2024-12-27

## 2024-12-20 RX ORDER — ONDANSETRON 2 MG/ML
4 INJECTION INTRAMUSCULAR; INTRAVENOUS ONCE
Status: CANCELLED
Start: 2024-12-27 | End: 2024-12-27

## 2024-12-20 RX ORDER — SODIUM CHLORIDE 9 MG/ML
5-250 INJECTION, SOLUTION INTRAVENOUS PRN
Status: CANCELLED | OUTPATIENT
Start: 2024-12-27

## 2024-12-20 RX ORDER — SODIUM CHLORIDE 9 MG/ML
5-250 INJECTION, SOLUTION INTRAVENOUS PRN
Status: DISCONTINUED | OUTPATIENT
Start: 2024-12-20 | End: 2024-12-21 | Stop reason: HOSPADM

## 2024-12-20 RX ORDER — ONDANSETRON 2 MG/ML
4 INJECTION INTRAMUSCULAR; INTRAVENOUS ONCE
Status: COMPLETED | OUTPATIENT
Start: 2024-12-20 | End: 2024-12-20

## 2024-12-20 RX ORDER — DIPHENHYDRAMINE HYDROCHLORIDE 50 MG/ML
50 INJECTION INTRAMUSCULAR; INTRAVENOUS
Status: CANCELLED | OUTPATIENT
Start: 2024-12-27

## 2024-12-20 RX ORDER — SODIUM CHLORIDE 0.9 % (FLUSH) 0.9 %
5-40 SYRINGE (ML) INJECTION PRN
Status: CANCELLED | OUTPATIENT
Start: 2024-12-27

## 2024-12-20 RX ORDER — ACETAMINOPHEN 325 MG/1
650 TABLET ORAL
Status: CANCELLED | OUTPATIENT
Start: 2024-12-27

## 2024-12-20 RX ORDER — ONDANSETRON 2 MG/ML
8 INJECTION INTRAMUSCULAR; INTRAVENOUS
Status: CANCELLED | OUTPATIENT
Start: 2024-12-27

## 2024-12-20 RX ORDER — HYDROCORTISONE SODIUM SUCCINATE 100 MG/2ML
100 INJECTION INTRAMUSCULAR; INTRAVENOUS
Status: CANCELLED | OUTPATIENT
Start: 2024-12-27

## 2024-12-20 RX ADMIN — SODIUM CHLORIDE 125 MG: 9 INJECTION, SOLUTION INTRAVENOUS at 11:15

## 2024-12-20 RX ADMIN — ONDANSETRON 4 MG: 2 INJECTION INTRAMUSCULAR; INTRAVENOUS at 11:11

## 2024-12-20 ASSESSMENT — PAIN DESCRIPTION - DESCRIPTORS: DESCRIPTORS: ACHING

## 2024-12-20 ASSESSMENT — PAIN DESCRIPTION - ORIENTATION: ORIENTATION: RIGHT;LEFT

## 2024-12-20 ASSESSMENT — PAIN SCALES - GENERAL: PAINLEVEL_OUTOF10: 4

## 2024-12-20 ASSESSMENT — PAIN DESCRIPTION - ONSET: ONSET: ON-GOING

## 2024-12-20 ASSESSMENT — PAIN DESCRIPTION - PAIN TYPE: TYPE: CHRONIC PAIN

## 2024-12-20 ASSESSMENT — PAIN DESCRIPTION - FREQUENCY: FREQUENCY: CONTINUOUS

## 2024-12-20 ASSESSMENT — PAIN - FUNCTIONAL ASSESSMENT: PAIN_FUNCTIONAL_ASSESSMENT: PREVENTS OR INTERFERES SOME ACTIVE ACTIVITIES AND ADLS

## 2024-12-20 ASSESSMENT — PAIN DESCRIPTION - LOCATION: LOCATION: LEG

## 2024-12-20 NOTE — PROGRESS NOTES
Patient tolerated Ferrlecit without any complications.    Denies dizziness, lightheadedness, acute nausea or vomiting, headache, heart palpitations, rash/itching or increased SOB.     Last vital signs:   /60   Pulse 82   Temp 98.1 °F (36.7 °C) (Oral)   Resp 16   LMP 03/21/2022   SpO2 100%     Patient instructed if they experience any of the above symptoms following today's visit, he/she is to notify the physician immediately or go to the Emergency Department.    Discharge instructions given to patient. Verbalizes understanding. Ambulated off unit per self in stable condition with belongings.

## 2024-12-20 NOTE — PLAN OF CARE
Problem: Safety - Adult  Goal: Free from fall injury  Outcome: Adequate for Discharge  Flowsheets (Taken 12/20/2024 1043)  Free From Fall Injury:   Instruct family/caregiver on patient safety   Based on caregiver fall risk screen, instruct family/caregiver to ask for assistance with transferring infant if caregiver noted to have fall risk factors  Note: Free from falls while in [facility].Discussed the need to use the call light for assistance when getting up to ambulate.        Problem: Discharge Planning  Goal: Discharge to home or other facility with appropriate resources  Outcome: Adequate for Discharge  Flowsheets (Taken 12/20/2024 1043)  Discharge to home or other facility with appropriate resources:   Identify discharge learning needs (meds, wound care, etc)   Identify barriers to discharge with patient and caregiver  Note: Verbalize understanding of discharge instructions, follow up appointments, and when to call Physician.Discuss understanding of discharge instructions, follow up appointments and when to call Physician.        Problem: Chronic Conditions and Co-morbidities  Goal: Patient's chronic conditions and co-morbidity symptoms are monitored and maintained or improved  Outcome: Adequate for Discharge  Flowsheets (Taken 12/20/2024 1043)  Care Plan - Patient's Chronic Conditions and Co-Morbidity Symptoms are Monitored and Maintained or Improved:   Collaborate with multidisciplinary team to address chronic and comorbid conditions and prevent exacerbation or deterioration   Monitor and assess patient's chronic conditions and comorbid symptoms for stability, deterioration, or improvement  Note: Patient verbalizes understanding to verbal information given on Ferrlecit,action and possible side effects. Aware to call MD if develop complications.      Care plan reviewed with patient. Patient verbalizes understanding of the plan of care and contributes to goal setting.

## 2024-12-20 NOTE — DISCHARGE INSTRUCTIONS
Please contact your Oncologist if you have any questions regarding the Ferrlecit that you received today.      Please call if you experience any of the the following symptoms after today's therapy / notify MD immediately or go to the Emergency Department.    *dizziness/lightheadedness  *acute nausea/vomiting - not relieved with medication  *headache - not relieved from Tylenol/pain medication  *chest pain/pressure  *rash/itching  *shortness of breath    Drink fluids - 48-64 ounces of fluids daily.    Please call if you develop fever/chills/signs or symptoms of an infection or you are unable to drink fluids.

## 2024-12-24 DIAGNOSIS — E61.1 IRON DEFICIENCY: ICD-10-CM

## 2024-12-24 RX ORDER — SCOLOPAMINE TRANSDERMAL SYSTEM 1 MG/1
PATCH, EXTENDED RELEASE TRANSDERMAL
Qty: 4 PATCH | Refills: 0 | Status: SHIPPED | OUTPATIENT
Start: 2024-12-24

## 2024-12-24 RX ORDER — SCOLOPAMINE TRANSDERMAL SYSTEM 1 MG/1
PATCH, EXTENDED RELEASE TRANSDERMAL
Qty: 4 PATCH | Refills: 2 | OUTPATIENT
Start: 2024-12-24

## 2024-12-27 ENCOUNTER — HOSPITAL ENCOUNTER (OUTPATIENT)
Dept: INFUSION THERAPY | Age: 54
Discharge: HOME | End: 2024-12-27
Payer: COMMERCIAL

## 2024-12-27 VITALS
BODY MASS INDEX: 40.37 KG/M2 | TEMPERATURE: 98.2 F | HEART RATE: 78 BPM | OXYGEN SATURATION: 97 % | DIASTOLIC BLOOD PRESSURE: 56 MMHG | SYSTOLIC BLOOD PRESSURE: 113 MMHG | WEIGHT: 282 LBS | HEIGHT: 70 IN

## 2024-12-27 DIAGNOSIS — K90.9 MALABSORPTION OF IRON: ICD-10-CM

## 2024-12-27 DIAGNOSIS — E61.1 IRON DEFICIENCY: ICD-10-CM

## 2024-12-27 DIAGNOSIS — Z98.84 HISTORY OF GASTRIC BYPASS: Primary | ICD-10-CM

## 2024-12-27 PROCEDURE — 2580000003 HC RX 258: Performed by: NURSE PRACTITIONER

## 2024-12-27 PROCEDURE — 6360000002 HC RX W HCPCS: Performed by: NURSE PRACTITIONER

## 2024-12-27 PROCEDURE — 96375 TX/PRO/DX INJ NEW DRUG ADDON: CPT

## 2024-12-27 PROCEDURE — 96365 THER/PROPH/DIAG IV INF INIT: CPT

## 2024-12-27 RX ORDER — SODIUM CHLORIDE 9 MG/ML
INJECTION, SOLUTION INTRAVENOUS CONTINUOUS
Status: CANCELLED | OUTPATIENT
Start: 2025-01-03

## 2024-12-27 RX ORDER — ACETAMINOPHEN 325 MG/1
650 TABLET ORAL
Status: CANCELLED | OUTPATIENT
Start: 2025-01-03

## 2024-12-27 RX ORDER — SODIUM CHLORIDE 9 MG/ML
5-250 INJECTION, SOLUTION INTRAVENOUS PRN
Status: DISCONTINUED | OUTPATIENT
Start: 2024-12-27 | End: 2024-12-28 | Stop reason: HOSPADM

## 2024-12-27 RX ORDER — SODIUM CHLORIDE 9 MG/ML
5-250 INJECTION, SOLUTION INTRAVENOUS PRN
Status: CANCELLED | OUTPATIENT
Start: 2025-01-03

## 2024-12-27 RX ORDER — HEPARIN 100 UNIT/ML
500 SYRINGE INTRAVENOUS PRN
Status: CANCELLED | OUTPATIENT
Start: 2025-01-03

## 2024-12-27 RX ORDER — HYDROCORTISONE SODIUM SUCCINATE 100 MG/2ML
100 INJECTION INTRAMUSCULAR; INTRAVENOUS
Status: CANCELLED | OUTPATIENT
Start: 2025-01-03

## 2024-12-27 RX ORDER — ONDANSETRON 2 MG/ML
4 INJECTION INTRAMUSCULAR; INTRAVENOUS ONCE
Status: CANCELLED
Start: 2025-01-03 | End: 2025-01-03

## 2024-12-27 RX ORDER — ONDANSETRON 2 MG/ML
8 INJECTION INTRAMUSCULAR; INTRAVENOUS
Status: CANCELLED | OUTPATIENT
Start: 2025-01-03

## 2024-12-27 RX ORDER — ONDANSETRON 2 MG/ML
4 INJECTION INTRAMUSCULAR; INTRAVENOUS ONCE
Status: COMPLETED | OUTPATIENT
Start: 2024-12-27 | End: 2024-12-27

## 2024-12-27 RX ORDER — SODIUM CHLORIDE 0.9 % (FLUSH) 0.9 %
5-40 SYRINGE (ML) INJECTION PRN
Status: CANCELLED | OUTPATIENT
Start: 2025-01-03

## 2024-12-27 RX ORDER — ALBUTEROL SULFATE 90 UG/1
4 INHALANT RESPIRATORY (INHALATION) PRN
Status: CANCELLED | OUTPATIENT
Start: 2025-01-03

## 2024-12-27 RX ORDER — DIPHENHYDRAMINE HYDROCHLORIDE 50 MG/ML
50 INJECTION INTRAMUSCULAR; INTRAVENOUS
Status: CANCELLED | OUTPATIENT
Start: 2025-01-03

## 2024-12-27 RX ADMIN — ONDANSETRON 4 MG: 2 INJECTION INTRAMUSCULAR; INTRAVENOUS at 11:24

## 2024-12-27 RX ADMIN — SODIUM CHLORIDE 125 MG: 9 INJECTION, SOLUTION INTRAVENOUS at 11:28

## 2024-12-27 NOTE — PLAN OF CARE
Problem: Safety - Adult  Goal: Free from fall injury  Outcome: Adequate for Discharge  Flowsheets (Taken 12/27/2024 1448)  Free From Fall Injury:   Based on caregiver fall risk screen, instruct family/caregiver to ask for assistance with transferring infant if caregiver noted to have fall risk factors   Instruct family/caregiver on patient safety  Note: Free from falls while in infusion center. Verbalized understanding of fall prevention and to ask for assistance with ambulation     Problem: Discharge Planning  Goal: Discharge to home or other facility with appropriate resources  Outcome: Adequate for Discharge  Flowsheets (Taken 12/27/2024 1448)  Discharge to home or other facility with appropriate resources:   Identify barriers to discharge with patient and caregiver   Identify discharge learning needs (meds, wound care, etc)  Note: Verbalized understanding of discharge instructions, follow ups and when to call doctor     Problem: Chronic Conditions and Co-morbidities  Goal: Patient's chronic conditions and co-morbidity symptoms are monitored and maintained or improved  Outcome: Adequate for Discharge  Flowsheets (Taken 12/27/2024 1448)  Care Plan - Patient's Chronic Conditions and Co-Morbidity Symptoms are Monitored and Maintained or Improved:   Collaborate with multidisciplinary team to address chronic and comorbid conditions and prevent exacerbation or deterioration   Monitor and assess patient's chronic conditions and comorbid symptoms for stability, deterioration, or improvement  Note: Patient verbalizes understanding to verbal information given on venofer,action and possible side effects. Aware to call MD if develop complications.

## 2024-12-27 NOTE — PROGRESS NOTES
Patient assessed for the following post ferrlecit:    Dizziness   No  Lightheadedness  No      Acute nausea/vomiting No  Headache   No  Chest pain/pressure  No  Rash/itching   No  Shortness of breath  No    Patient kept for 20 minutes observation post infusion.    Patient tolerated infusion treatment Ferrlecit without any complications.    Last vital signs:   BP (!) 113/56   Pulse 78   Temp 98.2 °F (36.8 °C) (Oral)   Ht 1.778 m (5' 10\")   Wt 127.9 kg (282 lb)   LMP 03/21/2022   SpO2 97%   BMI 40.46 kg/m²     Patient instructed if experience any of the above symptoms following today's infusion,he/she is to notify MD immediately or go to the emergency department.    Discharge instructions given to patient. Verbalizes understanding. Ambulated off unit per self with belongings.

## 2024-12-27 NOTE — DISCHARGE INSTRUCTIONS
Please contact your Oncologist if you have any questions regarding the infusion of ferrlecit that you received today.      Patient instructed if experience any of the symptoms following today's infusion / to notify MD immediately or go to emergency department.    * dizziness/lightheadedness  *acute nausea/vomiting - not relieved with medication  *headache - not relieved from Tylenol/pain medication  *chest pain/pressure  *rash/itching  *shortness of breath        Drink fluids - 48oz fluids daily  Call if develop fever/ chills/ signs or symptoms of infection

## 2025-01-03 ENCOUNTER — HOSPITAL ENCOUNTER (OUTPATIENT)
Dept: INFUSION THERAPY | Age: 55
Discharge: HOME | End: 2025-01-03
Payer: COMMERCIAL

## 2025-01-03 VITALS
OXYGEN SATURATION: 99 % | WEIGHT: 286.6 LBS | TEMPERATURE: 97.9 F | SYSTOLIC BLOOD PRESSURE: 121 MMHG | HEART RATE: 73 BPM | RESPIRATION RATE: 16 BRPM | HEIGHT: 70 IN | DIASTOLIC BLOOD PRESSURE: 68 MMHG | BODY MASS INDEX: 41.03 KG/M2

## 2025-01-03 DIAGNOSIS — Z98.84 HISTORY OF GASTRIC BYPASS: Primary | ICD-10-CM

## 2025-01-03 DIAGNOSIS — E61.1 IRON DEFICIENCY: ICD-10-CM

## 2025-01-03 DIAGNOSIS — K90.9 MALABSORPTION OF IRON: ICD-10-CM

## 2025-01-03 PROCEDURE — 96365 THER/PROPH/DIAG IV INF INIT: CPT

## 2025-01-03 PROCEDURE — 96375 TX/PRO/DX INJ NEW DRUG ADDON: CPT

## 2025-01-03 PROCEDURE — 6360000002 HC RX W HCPCS: Performed by: NURSE PRACTITIONER

## 2025-01-03 PROCEDURE — 2580000003 HC RX 258: Performed by: NURSE PRACTITIONER

## 2025-01-03 RX ORDER — SODIUM CHLORIDE 9 MG/ML
5-250 INJECTION, SOLUTION INTRAVENOUS PRN
OUTPATIENT
Start: 2025-01-10

## 2025-01-03 RX ORDER — ACETAMINOPHEN 325 MG/1
650 TABLET ORAL
OUTPATIENT
Start: 2025-01-10

## 2025-01-03 RX ORDER — DIPHENHYDRAMINE HYDROCHLORIDE 50 MG/ML
50 INJECTION INTRAMUSCULAR; INTRAVENOUS
OUTPATIENT
Start: 2025-01-10

## 2025-01-03 RX ORDER — SODIUM CHLORIDE 9 MG/ML
INJECTION, SOLUTION INTRAVENOUS CONTINUOUS
OUTPATIENT
Start: 2025-01-10

## 2025-01-03 RX ORDER — HYDROCORTISONE SODIUM SUCCINATE 100 MG/2ML
100 INJECTION INTRAMUSCULAR; INTRAVENOUS
OUTPATIENT
Start: 2025-01-10

## 2025-01-03 RX ORDER — ONDANSETRON 2 MG/ML
8 INJECTION INTRAMUSCULAR; INTRAVENOUS
OUTPATIENT
Start: 2025-01-10

## 2025-01-03 RX ORDER — HEPARIN 100 UNIT/ML
500 SYRINGE INTRAVENOUS PRN
OUTPATIENT
Start: 2025-01-10

## 2025-01-03 RX ORDER — ONDANSETRON 2 MG/ML
4 INJECTION INTRAMUSCULAR; INTRAVENOUS ONCE
Status: COMPLETED | OUTPATIENT
Start: 2025-01-03 | End: 2025-01-03

## 2025-01-03 RX ORDER — ONDANSETRON 2 MG/ML
4 INJECTION INTRAMUSCULAR; INTRAVENOUS ONCE
Status: CANCELLED
Start: 2025-01-10 | End: 2025-01-10

## 2025-01-03 RX ORDER — SODIUM CHLORIDE 0.9 % (FLUSH) 0.9 %
5-40 SYRINGE (ML) INJECTION PRN
OUTPATIENT
Start: 2025-01-10

## 2025-01-03 RX ORDER — SODIUM CHLORIDE 9 MG/ML
5-250 INJECTION, SOLUTION INTRAVENOUS PRN
Status: DISCONTINUED | OUTPATIENT
Start: 2025-01-03 | End: 2025-01-04 | Stop reason: HOSPADM

## 2025-01-03 RX ORDER — ALBUTEROL SULFATE 90 UG/1
4 INHALANT RESPIRATORY (INHALATION) PRN
OUTPATIENT
Start: 2025-01-10

## 2025-01-03 RX ADMIN — SODIUM CHLORIDE 125 MG: 9 INJECTION, SOLUTION INTRAVENOUS at 11:18

## 2025-01-03 RX ADMIN — ONDANSETRON 4 MG: 2 INJECTION INTRAMUSCULAR; INTRAVENOUS at 11:14

## 2025-01-03 NOTE — PLAN OF CARE
Problem: Safety - Adult  Goal: Free from fall injury  Outcome: Adequate for Discharge  Flowsheets (Taken 1/3/2025 1355)  Free From Fall Injury: Instruct family/caregiver on patient safety     Problem: Discharge Planning  Goal: Discharge to home or other facility with appropriate resources  Outcome: Adequate for Discharge  Flowsheets (Taken 1/3/2025 1355)  Discharge to home or other facility with appropriate resources: Identify barriers to discharge with patient and caregiver     Problem: Chronic Conditions and Co-morbidities  Goal: Patient's chronic conditions and co-morbidity symptoms are monitored and maintained or improved  Outcome: Adequate for Discharge  Flowsheets (Taken 1/3/2025 1355)  Care Plan - Patient's Chronic Conditions and Co-Morbidity Symptoms are Monitored and Maintained or Improved: Monitor and assess patient's chronic conditions and comorbid symptoms for stability, deterioration, or improvement  Note: Ferrlicit reviewed, patient verbalizes understanding of medication being administered and potential side effects.

## 2025-01-03 NOTE — PROGRESS NOTES
Patient assessed for the following post iron:    Dizziness   No  Lightheadedness  No      Acute nausea/vomiting No  Headache   No  Chest pain/pressure  No  Rash/itching   No  Shortness of breath  No    Patient kept for 20 minutes observation post infusion iron.    Patient tolerated iron treatment Ferrlicit without any complications.    Last vital signs:   /68   Pulse 73   Temp 97.9 °F (36.6 °C) (Oral)   Resp 16   Ht 1.778 m (5' 10\")   Wt 130 kg (286 lb 9.6 oz)   LMP 03/21/2022   SpO2 99%   BMI 41.12 kg/m²     Patient instructed if experience any of the above symptoms following today's infusion, she is to notify MD immediately or go to the emergency department.    Discharge instructions given to patient. Verbalizes understanding. Ambulated off unit per self, with belongings.

## 2025-01-03 NOTE — DISCHARGE INSTRUCTIONS
Please contact your Oncologist if you have any questions regarding the iron infusion that you received today.      Patient instructed if experience any of the symptoms following today's iron infusion to notify MD immediately or go to emergency department.    * dizziness/lightheadedness  *acute nausea/vomiting - not relieved with medication  *headache - not relieved from Tylenol/pain medication  *chest pain/pressure  *rash/itching  *shortness of breath        Drink fluids - 48oz fluids daily  Call if develop fever/ chills/ signs or symptoms of infection

## 2025-01-14 ENCOUNTER — OFFICE VISIT (OUTPATIENT)
Dept: FAMILY MEDICINE CLINIC | Age: 55
End: 2025-01-14
Payer: COMMERCIAL

## 2025-01-14 VITALS
WEIGHT: 281.2 LBS | TEMPERATURE: 98.9 F | BODY MASS INDEX: 40.26 KG/M2 | SYSTOLIC BLOOD PRESSURE: 122 MMHG | HEART RATE: 78 BPM | HEIGHT: 70 IN | RESPIRATION RATE: 20 BRPM | OXYGEN SATURATION: 99 % | DIASTOLIC BLOOD PRESSURE: 82 MMHG

## 2025-01-14 DIAGNOSIS — Z23 NEED FOR INFLUENZA VACCINATION: ICD-10-CM

## 2025-01-14 DIAGNOSIS — I10 PRIMARY HYPERTENSION: Primary | ICD-10-CM

## 2025-01-14 DIAGNOSIS — Z98.84 HISTORY OF GASTRIC BYPASS: ICD-10-CM

## 2025-01-14 DIAGNOSIS — K21.9 GASTROESOPHAGEAL REFLUX DISEASE, UNSPECIFIED WHETHER ESOPHAGITIS PRESENT: ICD-10-CM

## 2025-01-14 DIAGNOSIS — G35 MS (MULTIPLE SCLEROSIS) (HCC): ICD-10-CM

## 2025-01-14 DIAGNOSIS — E61.1 IRON DEFICIENCY: ICD-10-CM

## 2025-01-14 DIAGNOSIS — J30.2 SEASONAL ALLERGIES: ICD-10-CM

## 2025-01-14 DIAGNOSIS — K22.70 BARRETT'S ESOPHAGUS WITHOUT DYSPLASIA: ICD-10-CM

## 2025-01-14 PROCEDURE — 99214 OFFICE O/P EST MOD 30 MIN: CPT

## 2025-01-14 PROCEDURE — 3079F DIAST BP 80-89 MM HG: CPT

## 2025-01-14 PROCEDURE — 3074F SYST BP LT 130 MM HG: CPT

## 2025-01-14 RX ORDER — DEXLANSOPRAZOLE 60 MG/1
CAPSULE, DELAYED RELEASE ORAL
Qty: 30 CAPSULE | Refills: 0 | Status: SHIPPED | OUTPATIENT
Start: 2025-01-14

## 2025-01-14 RX ORDER — SUMATRIPTAN 50 MG/1
TABLET, FILM COATED ORAL
COMMUNITY
Start: 2024-10-23

## 2025-01-14 RX ORDER — EPINEPHRINE 0.15 MG/.3ML
0.15 INJECTION INTRAMUSCULAR PRN
COMMUNITY

## 2025-01-14 RX ORDER — MONTELUKAST SODIUM 10 MG/1
10 TABLET ORAL DAILY
Qty: 30 TABLET | Refills: 3 | Status: SHIPPED | OUTPATIENT
Start: 2025-01-14

## 2025-01-14 RX ORDER — MAGNESIUM OXIDE 400 MG/1
400 TABLET ORAL DAILY
COMMUNITY

## 2025-01-14 RX ORDER — ATENOLOL AND CHLORTHALIDONE TABLET 50; 25 MG/1; MG/1
1 TABLET ORAL DAILY
Qty: 90 TABLET | Refills: 1 | Status: SHIPPED | OUTPATIENT
Start: 2025-01-14 | End: 2025-07-13

## 2025-01-14 ASSESSMENT — ENCOUNTER SYMPTOMS
NAUSEA: 0
SHORTNESS OF BREATH: 0
ABDOMINAL PAIN: 0
COUGH: 0
CONSTIPATION: 1
BLOOD IN STOOL: 0
VOMITING: 0
DIARRHEA: 0
PHOTOPHOBIA: 0
WHEEZING: 0

## 2025-01-14 NOTE — PROGRESS NOTES
Immunizations Administered       Name Date Dose Route    Influenza, FLUCELVAX, (age 6 mo+) IM, Trivalent PF, 0.5mL 1/14/2025 0.5 mL Intramuscular    Site: Deltoid- Left    Lot: [id number]    NDC: 45281-973-43         For Pharmacy Admin Tracking Only    Program: Medical Group  CPA in place:  No  Recommendation Provided To: Provider: 1 via Verbally to provider  Intervention Detail: Vaccine Recommended/Administered  Intervention Accepted By: Provider: 1  Gap Closed?: Yes   Time Spent (min): 10   
DIRECTED 4 patch 0    trospium (SANCTURA) 20 MG tablet Take 1 tablet by mouth 2 times daily 60 tablet 3    naproxen (NAPROSYN) 500 MG tablet Take 1 tablet by mouth as needed for Pain (Headache)      SUMAtriptan (IMITREX) 100 MG tablet Take 1 tablet by mouth as needed for Migraine      ondansetron (ZOFRAN-ODT) 4 MG disintegrating tablet Take 1 tablet by mouth 3 times daily as needed for Nausea or Vomiting 21 tablet 0    potassium chloride (KLOR-CON M) 20 MEQ extended release tablet Take 1 tablet by mouth daily for 5 days 5 tablet 0    Multiple Vitamin (MULTIVITAMIN ADULT PO) Take by mouth      BLACK COHOSH COMPOUND PO Take 1 tablet by mouth daily      Zinc 15 MG CAPS Take 15 mg by mouth nightly      budesonide (PULMICORT FLEXHALER) 180 MCG/ACT AEPB inhaler Inhale 2 puffs into the lungs 2 times daily 1 each 0    hydroxychloroquine (PLAQUENIL) 200 MG tablet Take 1 tablet by mouth 2 times daily      predniSONE (DELTASONE) 10 MG tablet Take 1 tablet by mouth See Admin Instructions Takes 1/2 prior to injection on saturday      fexofenadine (ALLEGRA) 180 MG tablet Take 1 tablet by mouth daily      modafinil (PROVIGIL) 200 MG tablet Take 1 tablet by mouth in the morning and at bedtime.  5    cyanocobalamin 1000 MCG/ML injection 1 cc IM every month 12 mL 0    ferrous sulfate 325 (65 FE) MG EC tablet Take 1 tablet by mouth daily      Vitamin Mixture (RADHA-C PO) Take 1 tablet by mouth daily.      mometasone (NASONEX) 50 MCG/ACT nasal spray 2 sprays by Nasal route daily      baclofen (LIORESAL) 10 MG tablet Take 1 tablet by mouth 3 times daily      gabapentin (NEURONTIN) 100 MG capsule Take 1 capsule by mouth 2 times daily. Takes 300 mg at night also      Cholecalciferol (VITAMIN D-3) 5000 UNITS TABS Take 4 tablets by mouth daily      interferon beta-1a (AVONEX) 30 MCG injection Inject 30 mcg into the muscle once a week      nortriptyline (PAMELOR) 10 MG capsule Take 1 capsule by mouth nightly       No current

## 2025-01-14 NOTE — ASSESSMENT & PLAN NOTE
BP Readings from Last 3 Encounters:   01/14/25 122/82   01/03/25 121/68   12/27/24 (!) 113/56   Chronic, at goal (stable), continue current plan pending work up below, medication adherence emphasized, and lifestyle modifications recommended. Medication refills and Lab work as below.

## 2025-01-14 NOTE — ASSESSMENT & PLAN NOTE
Stable, chronic condition at goal on current regimen; will continue current therapies. Refills as below.

## 2025-01-14 NOTE — ASSESSMENT & PLAN NOTE
Monitored by specialist- no acute findings meriting change in the plan. Medication refills and Lab work as below.

## 2025-01-28 ENCOUNTER — OFFICE VISIT (OUTPATIENT)
Dept: UROLOGY | Age: 55
End: 2025-01-28
Payer: COMMERCIAL

## 2025-01-28 VITALS — BODY MASS INDEX: 40.94 KG/M2 | WEIGHT: 286 LBS | HEIGHT: 70 IN | RESPIRATION RATE: 22 BRPM

## 2025-01-28 DIAGNOSIS — R33.9 URINARY RETENTION: Primary | ICD-10-CM

## 2025-01-28 LAB
BILIRUBIN, URINE: NEGATIVE
BLOOD URINE, POC: NEGATIVE
CHARACTER, URINE: CLEAR
COLOR, UA: YELLOW
GLUCOSE URINE: NEGATIVE MG/DL
KETONES, URINE: NEGATIVE
LEUKOCYTE CLUMPS, URINE: NEGATIVE
NITRITE, URINE: NEGATIVE
PH, URINE: 6 (ref 5–9)
POST VOID RESIDUAL (PVR): 13 ML
PROTEIN, URINE: NEGATIVE MG/DL
SPECIFIC GRAVITY UA: 1.02 (ref 1–1.03)
UROBILINOGEN, URINE: 0.2 EU/DL (ref 0–1)

## 2025-01-28 PROCEDURE — 81003 URINALYSIS AUTO W/O SCOPE: CPT | Performed by: NURSE PRACTITIONER

## 2025-01-28 PROCEDURE — 51798 US URINE CAPACITY MEASURE: CPT | Performed by: NURSE PRACTITIONER

## 2025-01-28 PROCEDURE — 99214 OFFICE O/P EST MOD 30 MIN: CPT | Performed by: NURSE PRACTITIONER

## 2025-01-28 RX ORDER — TROSPIUM CHLORIDE 20 MG/1
20 TABLET, FILM COATED ORAL 2 TIMES DAILY
Qty: 180 TABLET | Refills: 3 | Status: SHIPPED | OUTPATIENT
Start: 2025-01-28

## 2025-01-28 RX ORDER — DOCUSATE SODIUM 100 MG/1
100 CAPSULE, LIQUID FILLED ORAL 2 TIMES DAILY
COMMUNITY

## 2025-01-28 ASSESSMENT — ENCOUNTER SYMPTOMS
VOMITING: 0
NAUSEA: 0
BACK PAIN: 0
ABDOMINAL PAIN: 0

## 2025-01-28 NOTE — PROGRESS NOTES
[facility] [place] [facility]  [facility]  [address]  [address]  [facility] [address]  Dept: [phone]  Loc: [phone]    Visit Date: 1/28/2025        HPI:     Karyna Branch is a 54 y.o. female who presents today for:  Chief Complaint   Patient presents with    Urinary Retention       HPI  Pt seen in follow up for mixed incontinence and OAB    Hx of MS.  Here for follow up of frequency, urgency, mixed incontinence (urge>stress) and OAB.   Cystoscopy by Dr. Rodriguez 10/2024 normal.   Started on medication -Gemtesa and Myrbetriq not covered. Started on trospium 20 mg BID.     Reports frequency and urgency improved.   Now not having to wear pads routinely unless traveling.     Current Outpatient Medications   Medication Sig Dispense Refill    docusate sodium (COLACE) 100 MG capsule Take 1 capsule by mouth 2 times daily      SUMAtriptan (IMITREX) 50 MG tablet TAKE 1 TABLET BY MOUTH AT THE ONSET OF MIGRAINE. MAY REPEAT IN 2 HOURS IF NEEDED. MAX OF 2 TABLETS IN 24 HOURS      aspirin-acetaminophen-caffeine (EXCEDRIN MIGRAINE) 250-250-65 MG per tablet Take by mouth      Calcium Carbonate-Vit D-Min (SM CALCIUM 600+D PLUS MINERALS) 600-800 MG-UNIT CHEW Take by mouth      diphenhydrAMINE-APAP, sleep, (TYLENOL PM EXTRA STRENGTH PO) Take by mouth      EPINEPHrine (EPIPEN JR) 0.15 MG/0.3ML SOAJ Inject 0.3 mLs into the muscle as needed      magnesium oxide (MAG-OX) 400 MG tablet Take 1 tablet by mouth daily      Probiotic Product CAPS Take by mouth      HYDROcodone-Acetaminophen (NORCO PO) Norco      melatonin 3 MG TABS tablet Take by mouth      atenolol-chlorthalidone (TENORETIC) 50-25 MG per tablet Take 1 tablet by mouth daily Hasnt taken for 2 weeks/ patient checking daily and has been okay 90 tablet 1    montelukast (SINGULAIR) 10 MG tablet Take 1 tablet by mouth daily 30 tablet 3    dexlansoprazole (DEXILANT) 60 MG CPDR delayed release capsule TAKE ONE CAPSULE BY MOUTH DAILY 30 capsule

## 2025-02-07 ENCOUNTER — LAB (OUTPATIENT)
Dept: LAB | Age: 55
End: 2025-02-07

## 2025-02-07 DIAGNOSIS — Z98.84 HISTORY OF GASTRIC BYPASS: ICD-10-CM

## 2025-02-07 DIAGNOSIS — E61.1 IRON DEFICIENCY: ICD-10-CM

## 2025-02-07 LAB
BASOPHILS ABSOLUTE: 0 THOU/MM3 (ref 0–0.1)
BASOPHILS NFR BLD AUTO: 0.8 %
DEPRECATED RDW RBC AUTO: 42.3 FL (ref 35–45)
EOSINOPHIL NFR BLD AUTO: 2.8 %
EOSINOPHILS ABSOLUTE: 0.1 THOU/MM3 (ref 0–0.4)
ERYTHROCYTE [DISTWIDTH] IN BLOOD BY AUTOMATED COUNT: 13.8 % (ref 11.5–14.5)
FERRITIN SERPL IA-MCNC: 179 NG/ML (ref 10–291)
FOLATE SERPL-MCNC: > 20 NG/ML (ref 4.8–24.2)
HCT VFR BLD AUTO: 40.8 % (ref 37–47)
HGB BLD-MCNC: 13.7 GM/DL (ref 12–16)
IMM GRANULOCYTES # BLD AUTO: 0.02 THOU/MM3 (ref 0–0.07)
IMM GRANULOCYTES NFR BLD AUTO: 0.4 %
IRON SERPL-MCNC: 86 UG/DL (ref 50–170)
LYMPHOCYTES ABSOLUTE: 1 THOU/MM3 (ref 1–4.8)
LYMPHOCYTES NFR BLD AUTO: 20.1 %
MCH RBC QN AUTO: 28.3 PG (ref 26–33)
MCHC RBC AUTO-ENTMCNC: 33.6 GM/DL (ref 32.2–35.5)
MCV RBC AUTO: 84.3 FL (ref 81–99)
MONOCYTES ABSOLUTE: 0.6 THOU/MM3 (ref 0.4–1.3)
MONOCYTES NFR BLD AUTO: 11.4 %
NEUTROPHILS ABSOLUTE: 3.3 THOU/MM3 (ref 1.8–7.7)
NEUTROPHILS NFR BLD AUTO: 64.5 %
NRBC BLD AUTO-RTO: 0 /100 WBC
PLATELET # BLD AUTO: 337 THOU/MM3 (ref 130–400)
PMV BLD AUTO: 10 FL (ref 9.4–12.4)
RBC # BLD AUTO: 4.84 MILL/MM3 (ref 4.2–5.4)
TIBC SERPL-MCNC: 337 UG/DL (ref 171–450)
VIT B12 SERPL-MCNC: 498 PG/ML (ref 211–911)
WBC # BLD AUTO: 5.1 THOU/MM3 (ref 4.8–10.8)

## 2025-02-11 ENCOUNTER — OFFICE VISIT (OUTPATIENT)
Dept: FAMILY MEDICINE CLINIC | Age: 55
End: 2025-02-11

## 2025-02-11 ENCOUNTER — TELEPHONE (OUTPATIENT)
Dept: FAMILY MEDICINE CLINIC | Age: 55
End: 2025-02-11

## 2025-02-11 VITALS
HEIGHT: 70 IN | RESPIRATION RATE: 18 BRPM | TEMPERATURE: 97.9 F | OXYGEN SATURATION: 96 % | HEART RATE: 92 BPM | SYSTOLIC BLOOD PRESSURE: 118 MMHG | DIASTOLIC BLOOD PRESSURE: 80 MMHG | WEIGHT: 288.2 LBS | BODY MASS INDEX: 41.26 KG/M2

## 2025-02-11 DIAGNOSIS — H00.16 ACUTE CHALAZION OF LEFT EYE: Primary | ICD-10-CM

## 2025-02-11 RX ORDER — SULFAMETHOXAZOLE AND TRIMETHOPRIM 800; 160 MG/1; MG/1
1 TABLET ORAL 2 TIMES DAILY
Qty: 10 TABLET | Refills: 0 | Status: SHIPPED | OUTPATIENT
Start: 2025-02-11 | End: 2025-02-16

## 2025-02-11 RX ORDER — GABAPENTIN 300 MG/1
CAPSULE ORAL
COMMUNITY
Start: 2025-01-17

## 2025-02-11 SDOH — ECONOMIC STABILITY: FOOD INSECURITY: WITHIN THE PAST 12 MONTHS, YOU WORRIED THAT YOUR FOOD WOULD RUN OUT BEFORE YOU GOT MONEY TO BUY MORE.: PATIENT DECLINED

## 2025-02-11 SDOH — ECONOMIC STABILITY: FOOD INSECURITY: WITHIN THE PAST 12 MONTHS, THE FOOD YOU BOUGHT JUST DIDN'T LAST AND YOU DIDN'T HAVE MONEY TO GET MORE.: PATIENT DECLINED

## 2025-02-11 ASSESSMENT — ENCOUNTER SYMPTOMS
NAUSEA: 0
CONSTIPATION: 0
SHORTNESS OF BREATH: 0
DIARRHEA: 0
ABDOMINAL PAIN: 0
COUGH: 0

## 2025-02-11 NOTE — PROGRESS NOTES
[facility]ESSION[facility]  [address]  [facility] [address]  Dept: [phone]  Dept Fax: [phone]  Loc: [phone]      Karyna Branch is a 54 y.o. female who presents todayfor her medical conditions/complaints as noted below.  Karyna Branch is c/o of Eye Problem (Patient in office today, believes she has an eye infection. Patient stated started Friday, has a bump on left eye, patient states that it is hard. Has been doing warm compresses. Patient denies any drainage. 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    Medical History  Karyna has a past medical history of Acid reflux, COVID, Hypertensive disorder, Migraine headache with aura, MS (multiple sclerosis) (HCC), Raynaud disease, Vitamin B 12 deficiency, and Vitamin D deficiency.    Past SurgicalHistory  The patient  has a past surgical history that includes Gastric bypass surgery (2000); shoulder surgery (1999 x 2); Cholecystectomy (2002); Colonoscopy (01/2012); Upper gastrointestinal endoscopy (01/01/2012); Hemorrhoid surgery; Tympanostomy tube placement (1972); Tonsillectomy and adenoidectomy (1978); Colonoscopy; Appendectomy (11/2018); AVIS STEREO BREAST BX W LOC DEVICE 1ST LESION LEFT (Left, 12/13/2012); and Eye surgery 
  [facility] PROFESSION[facility]  [address]  [facility] [address]  Dept: [phone]  Dept Fax: [phone]  Loc: [phone]      Karyna Branch is a 54 y.o. female who presents todayfor her medical conditions/complaints as noted below.  Karyna Branch is c/o of Eye Problem (Patient in office today, believes she has an eye infection. Patient stated started Friday, has a bump on left eye, patient states that it is hard. Has been doing warm compresses. Patient denies any drainage. )      :     Patient presenting today with possible left eye infection.  Started last Friday has a bump on her left eye lid lower.  She states that it is hard has gotten minimally larger and some associated swelling.  Has tried warm compresses but nothing seems to have helped.  No drainage or discharge.  No fevers or chills.    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    Medical History  Karyna has a past medical history of Acid reflux, COVID, Hypertensive disorder, Migraine headache with aura, MS (multiple sclerosis) (HCC), Raynaud disease, Vitamin B 12 deficiency, and Vitamin D deficiency.    Past SurgicalHistory  The patient  has a past surgical history that includes Gastric bypass surgery (2000); shoulder surgery (1999 x 2); Cholecystectomy (2002); 
with the documented assessment and plan as documented by the resident.        Vanita Salmeron, DO 2/13/2025 3:07 PM

## 2025-02-12 NOTE — TELEPHONE ENCOUNTER
Provider received a call from [facility] pharmacy regarding antibiotics for eye infection.  Pharmacy states they have not received the prescription.  Provider touch base with resident who prescribed medicine, it was sent into pharmacy.  Provider called pharmacy back and double check that the medication was there for patient to .  All questions were answered

## 2025-05-13 ENCOUNTER — OFFICE VISIT (OUTPATIENT)
Dept: FAMILY MEDICINE CLINIC | Age: 55
End: 2025-05-13
Payer: COMMERCIAL

## 2025-05-13 VITALS
HEART RATE: 74 BPM | WEIGHT: 287.8 LBS | TEMPERATURE: 98.4 F | RESPIRATION RATE: 16 BRPM | BODY MASS INDEX: 41.3 KG/M2 | SYSTOLIC BLOOD PRESSURE: 122 MMHG | DIASTOLIC BLOOD PRESSURE: 86 MMHG | OXYGEN SATURATION: 100 %

## 2025-05-13 DIAGNOSIS — K21.9 GASTROESOPHAGEAL REFLUX DISEASE, UNSPECIFIED WHETHER ESOPHAGITIS PRESENT: ICD-10-CM

## 2025-05-13 DIAGNOSIS — Z13.31 DEPRESSION SCREENING: ICD-10-CM

## 2025-05-13 DIAGNOSIS — K22.70 BARRETT'S ESOPHAGUS WITHOUT DYSPLASIA: ICD-10-CM

## 2025-05-13 DIAGNOSIS — J30.2 SEASONAL ALLERGIES: ICD-10-CM

## 2025-05-13 DIAGNOSIS — E78.5 HYPERLIPIDEMIA, UNSPECIFIED HYPERLIPIDEMIA TYPE: ICD-10-CM

## 2025-05-13 DIAGNOSIS — G35 MS (MULTIPLE SCLEROSIS) (HCC): ICD-10-CM

## 2025-05-13 DIAGNOSIS — I10 PRIMARY HYPERTENSION: Primary | ICD-10-CM

## 2025-05-13 PROCEDURE — 3074F SYST BP LT 130 MM HG: CPT

## 2025-05-13 PROCEDURE — 99214 OFFICE O/P EST MOD 30 MIN: CPT

## 2025-05-13 PROCEDURE — 3079F DIAST BP 80-89 MM HG: CPT

## 2025-05-13 PROCEDURE — G0444 DEPRESSION SCREEN ANNUAL: HCPCS

## 2025-05-13 RX ORDER — DEXLANSOPRAZOLE 60 MG/1
60 CAPSULE, DELAYED RELEASE ORAL DAILY
Qty: 90 CAPSULE | Refills: 1 | Status: SHIPPED | OUTPATIENT
Start: 2025-05-13 | End: 2025-11-09

## 2025-05-13 RX ORDER — ATENOLOL AND CHLORTHALIDONE TABLET 50; 25 MG/1; MG/1
1 TABLET ORAL DAILY
Qty: 90 TABLET | Refills: 1 | Status: SHIPPED | OUTPATIENT
Start: 2025-05-13 | End: 2025-11-09

## 2025-05-13 RX ORDER — MONTELUKAST SODIUM 10 MG/1
10 TABLET ORAL DAILY
Qty: 90 TABLET | Refills: 1 | Status: SHIPPED | OUTPATIENT
Start: 2025-05-13 | End: 2025-11-09

## 2025-05-13 ASSESSMENT — PATIENT HEALTH QUESTIONNAIRE - PHQ9
SUM OF ALL RESPONSES TO PHQ QUESTIONS 1-9: 2
SUM OF ALL RESPONSES TO PHQ QUESTIONS 1-9: 2
1. LITTLE INTEREST OR PLEASURE IN DOING THINGS: NOT AT ALL
SUM OF ALL RESPONSES TO PHQ QUESTIONS 1-9: 2
2. FEELING DOWN, DEPRESSED OR HOPELESS: MORE THAN HALF THE DAYS
SUM OF ALL RESPONSES TO PHQ QUESTIONS 1-9: 2

## 2025-05-13 NOTE — PROGRESS NOTES
Patient:Karyna Branch  YOB: 1970  MRN: [id number]    Subjective   55 y.o. female who presents for the following: 3 Month Follow-Up (/)    Urology follow up scheduled in JULY 2025.    History of MS.    GERD - doing well, garnett's esophagus, sees Dr. Galan    Allergies - needs refills    Hypertension  This is a chronic problem. The current episode started more than 1 year ago. The problem is unchanged. The problem is controlled. Associated symptoms include headaches. Pertinent negatives include no chest pain, neck pain, palpitations or shortness of breath. Risk factors for coronary artery disease include obesity, stress and post-menopausal state. Past treatments include beta blockers and diuretics. The current treatment provides significant improvement. There is no history of kidney disease, CAD/MI, CVA or heart failure.   Medication Refill  This is a recurrent problem. Associated symptoms include headaches and weakness (MS history). Pertinent negatives include no abdominal pain, chest pain, chills, coughing, fatigue, fever, nausea, neck pain, rash or vomiting.     Review of Systems   Review of Systems   Constitutional:  Negative for activity change, appetite change, chills, fatigue and fever.   HENT:  Negative for dental problem, hearing loss and sneezing.    Eyes:  Negative for photophobia and visual disturbance.   Respiratory:  Negative for cough, shortness of breath and wheezing.    Cardiovascular:  Negative for chest pain, palpitations and leg swelling.   Gastrointestinal:  Positive for constipation (improved, responding to OTC therapies). Negative for abdominal pain, blood in stool, diarrhea, nausea and vomiting.   Genitourinary:  Positive for difficulty urinating (improved, still there). Negative for dysuria and hematuria.   Musculoskeletal:  Positive for gait problem (needs canedue to MS). Negative for neck pain and neck stiffness.   Skin:  Negative for pallor, rash and wound. 
Physician Statement  I have discussed the case, including pertinent history and exam findings with the resident. I also have seen the patient and performed key portions of the examination.  I agree with the documented assessment and plan as documented by the resident.        Vanita Salmeron, DO 5/20/2025 2:59 PM

## 2025-05-19 PROBLEM — E78.5 HYPERLIPIDEMIA: Status: ACTIVE | Noted: 2025-05-19

## 2025-05-19 NOTE — ASSESSMENT & PLAN NOTE
BP Readings from Last 3 Encounters:   05/13/25 122/86   02/11/25 118/80   01/14/25 122/82   Chronic, at goal (stable), continue current plan pending work up below, medication adherence emphasized, and lifestyle modifications recommended. Continue current medications (Patient comments: no SE reported ). Goal BP control <140/90 and ideal BP <135/85. Medication refills and Lab work as below.

## 2025-05-19 NOTE — ASSESSMENT & PLAN NOTE
Chronic, at goal (stable), continue current plan pending work up below, medication adherence emphasized, and lifestyle modifications recommended. Continue current medications (Patient comments: controlled on medications ). Medication refills and Lab work as below. Recommended patient avoid tomato products, coffee, chocolate, citrus, cigarettes, alcohol, and capsaicin containing / spicy foods as well as avoid sleeping on their right side or eating large meals within 2 hours of bedtime.

## 2025-05-19 NOTE — ASSESSMENT & PLAN NOTE
Monitored by specialist- no acute findings meriting change in the plan. Medication refills and Lab work as below. Stable, chronic condition at goal on current regimen; will continue current therapies.

## 2025-05-22 ENCOUNTER — TRANSCRIBE ORDERS (OUTPATIENT)
Dept: ADMINISTRATIVE | Age: 55
End: 2025-05-22

## 2025-05-22 DIAGNOSIS — G35 MULTIPLE SCLEROSIS (HCC): Primary | ICD-10-CM

## 2025-06-12 ENCOUNTER — HOSPITAL ENCOUNTER (OUTPATIENT)
Dept: SPEECH THERAPY | Age: 55
Setting detail: THERAPIES SERIES
Discharge: HOME | End: 2025-06-12
Payer: COMMERCIAL

## 2025-06-12 ENCOUNTER — LAB (OUTPATIENT)
Dept: LAB | Age: 55
End: 2025-06-12

## 2025-06-12 DIAGNOSIS — E78.5 HYPERLIPIDEMIA, UNSPECIFIED HYPERLIPIDEMIA TYPE: ICD-10-CM

## 2025-06-12 DIAGNOSIS — K21.9 GASTROESOPHAGEAL REFLUX DISEASE, UNSPECIFIED WHETHER ESOPHAGITIS PRESENT: ICD-10-CM

## 2025-06-12 DIAGNOSIS — G35 MS (MULTIPLE SCLEROSIS) (HCC): ICD-10-CM

## 2025-06-12 DIAGNOSIS — I10 PRIMARY HYPERTENSION: ICD-10-CM

## 2025-06-12 LAB
ALBUMIN SERPL BCG-MCNC: 3.8 G/DL (ref 3.4–4.9)
ALP SERPL-CCNC: 67 U/L (ref 38–126)
ALT SERPL W/O P-5'-P-CCNC: 58 U/L (ref 10–35)
ANION GAP SERPL CALC-SCNC: 11 MEQ/L (ref 8–16)
AST SERPL-CCNC: 39 U/L (ref 10–35)
BASOPHILS ABSOLUTE: 0.1 THOU/MM3 (ref 0–0.1)
BASOPHILS NFR BLD AUTO: 0.9 %
BILIRUB CONJ SERPL-MCNC: < 0.1 MG/DL (ref 0–0.2)
BILIRUB SERPL-MCNC: 0.3 MG/DL (ref 0.3–1.2)
BUN SERPL-MCNC: 17 MG/DL (ref 8–23)
CALCIUM SERPL-MCNC: 9 MG/DL (ref 8.6–10)
CHLORIDE SERPL-SCNC: 98 MEQ/L (ref 98–111)
CHOLESTEROL, FASTING: 209 MG/DL (ref 100–199)
CO2 SERPL-SCNC: 28 MEQ/L (ref 22–29)
CREAT SERPL-MCNC: 0.6 MG/DL (ref 0.5–0.9)
DEPRECATED RDW RBC AUTO: 40.6 FL (ref 35–45)
EOSINOPHIL NFR BLD AUTO: 3.1 %
EOSINOPHILS ABSOLUTE: 0.2 THOU/MM3 (ref 0–0.4)
ERYTHROCYTE [DISTWIDTH] IN BLOOD BY AUTOMATED COUNT: 13.2 % (ref 11.5–14.5)
GFR SERPL CREATININE-BSD FRML MDRD: > 90 ML/MIN/1.73M2
GLUCOSE FASTING: 100 MG/DL (ref 74–109)
HCT VFR BLD AUTO: 39.4 % (ref 37–47)
HDLC SERPL-MCNC: 62 MG/DL
HGB BLD-MCNC: 13.1 GM/DL (ref 12–16)
IMM GRANULOCYTES # BLD AUTO: 0.02 THOU/MM3 (ref 0–0.07)
IMM GRANULOCYTES NFR BLD AUTO: 0.3 %
LDLC SERPL CALC-MCNC: 127 MG/DL
LYMPHOCYTES ABSOLUTE: 1 THOU/MM3 (ref 1–4.8)
LYMPHOCYTES NFR BLD AUTO: 17.2 %
MCH RBC QN AUTO: 27.8 PG (ref 26–33)
MCHC RBC AUTO-ENTMCNC: 33.2 GM/DL (ref 32.2–35.5)
MCV RBC AUTO: 83.7 FL (ref 81–99)
MONOCYTES ABSOLUTE: 0.7 THOU/MM3 (ref 0.4–1.3)
MONOCYTES NFR BLD AUTO: 11.1 %
NEUTROPHILS ABSOLUTE: 4 THOU/MM3 (ref 1.8–7.7)
NEUTROPHILS NFR BLD AUTO: 67.4 %
NRBC BLD AUTO-RTO: 0 /100 WBC
PLATELET # BLD AUTO: 318 THOU/MM3 (ref 130–400)
PMV BLD AUTO: 10.5 FL (ref 9.4–12.4)
POTASSIUM SERPL-SCNC: 4.3 MEQ/L (ref 3.5–5.2)
PROT SERPL-MCNC: 6.4 G/DL (ref 6.4–8.3)
RBC # BLD AUTO: 4.71 MILL/MM3 (ref 4.2–5.4)
SODIUM SERPL-SCNC: 137 MEQ/L (ref 135–145)
TRIGLYCERIDE, FASTING: 98 MG/DL (ref 0–199)
WBC # BLD AUTO: 5.9 THOU/MM3 (ref 4.8–10.8)

## 2025-06-12 PROCEDURE — 92523 SPEECH SOUND LANG COMPREHEN: CPT | Performed by: SPEECH-LANGUAGE PATHOLOGIST

## 2025-06-12 NOTE — PROGRESS NOTES
completed in subsequent sessions    SHORT TERM GOAL #2: Patient will verbalize and follow through with using compensatory memory strategies within completion of daily tasks without cues at least 80% of the time for improved success with managing her daily tasks/schedule.  INTERVENTIONS: to be completed in subsequent sessions    SHORT TERM GOAL #3: Patient will complete high level verbal expression tasks (verbal description, narrative, conversation) with use of word finding strategies without cues at least 80% of the time for improved success conversing with family, friends and medical personnel.  INTERVENTIONS: to be completed in subsequent sessions    Long Term Goals:  Time Frame for Long-Term Goals: 8 weeks    LONG-TERM GOAL #1: Patient will independently utilize word finding and memory strategies at least 90% of the time for improved success conversing with others and completing daily tasks.  INTERVENTIONS: to be completed in subsequent sessions    PLAN:  Treatment Recommendations:     [x]  Plan of care initiated.  Plan to see patient 1 times per week for 8 weeks to address the treatment planned outlined above.  []  Continue with current plan of care  []  Modify plan of care as follows:    []  Hold pending physician visit  []  Discharge    Time In 1355   Time Out 1450   Timed Code Minutes: 0 min   Total Treatment Time: 55 min         Teodora Guerin M.S. CCC-SLP [id number]

## 2025-06-17 ENCOUNTER — RESULTS FOLLOW-UP (OUTPATIENT)
Dept: FAMILY MEDICINE CLINIC | Age: 55
End: 2025-06-17

## 2025-06-17 DIAGNOSIS — R79.89 ELEVATED LFTS: Primary | ICD-10-CM

## 2025-06-27 ENCOUNTER — HOSPITAL ENCOUNTER (OUTPATIENT)
Dept: MRI IMAGING | Age: 55
Discharge: HOME | End: 2025-06-27
Payer: COMMERCIAL

## 2025-06-27 DIAGNOSIS — G35 MULTIPLE SCLEROSIS (HCC): ICD-10-CM

## 2025-06-27 PROCEDURE — A9579 GAD-BASE MR CONTRAST NOS,1ML: HCPCS | Performed by: PSYCHIATRY & NEUROLOGY

## 2025-06-27 PROCEDURE — 6360000004 HC RX CONTRAST MEDICATION: Performed by: PSYCHIATRY & NEUROLOGY

## 2025-06-27 PROCEDURE — 72156 MRI NECK SPINE W/O & W/DYE: CPT

## 2025-06-27 RX ORDER — GADOTERIDOL 279.3 MG/ML
20 INJECTION INTRAVENOUS
Status: COMPLETED | OUTPATIENT
Start: 2025-06-27 | End: 2025-06-27

## 2025-06-27 RX ADMIN — GADOTERIDOL 20 ML: 279.3 INJECTION, SOLUTION INTRAVENOUS at 16:11

## 2025-07-17 ENCOUNTER — HOSPITAL ENCOUNTER (OUTPATIENT)
Dept: SPEECH THERAPY | Age: 55
Setting detail: THERAPIES SERIES
Discharge: HOME | End: 2025-07-17
Payer: COMMERCIAL

## 2025-07-17 PROCEDURE — 92507 TX SP LANG VOICE COMM INDIV: CPT | Performed by: SPEECH-LANGUAGE PATHOLOGIST

## 2025-07-17 NOTE — PROGRESS NOTES
AFTER EVALUATION.  INSURANCE THERAPY BENEFIT: Allowed 20 visits per calendar year.  0 visits have been used. Hard Max.   Initial CPT Codes Requested 61024 - Speech/Language Therapy  74846 - Cognitive Function Interventions (first 15 min) and 28108 - Cognitive Function Interventions (ea addtl 15 min)   Progress Note Counter 2/10 for progress note (Reporting Period: 6/12/25 to  )   Recertification Date 08/07/25   Physician Follow-Up Dr. Anthony - 9/25/25   Physician Orders    History of Present Illness Patient reports she was diagnosed with multiple sclerosis in 1991.  Patient reports she says the wrong word at times (paraphasias).  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  Patient also reports she has instances of anomia within conversation.  Patient also reports having difficulty with her short term memory.  Patient reports she has forgotten to take her night pills at times, more so if her routine changes.  Patient reports her symptoms have gotten worse over the last 3-4 years or so.    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  Patient reports she notices her voice changing/giving out on her at times.  Patient also reports she has difficulty singing at [employer].      SUBJECTIVE: Patient arrived to this appointment independently.     GOALS:  Patient Goal: did not state this date, will address in future session    Short Term Goals:  Time Frame for Short-Term Goals: 4 weeks    SHORT TERM GOAL #1: Patient will complete high level executive function tasks (multi-steps) with 80% accuracy without cues for improved success with completing IADL's.  INTERVENTIONS: Patient educated on this goal, however, did not address this date d/t focus on other goals.    SHORT TERM GOAL #2: Patient will verbalize and follow through with using compensatory

## 2025-07-24 ENCOUNTER — HOSPITAL ENCOUNTER (OUTPATIENT)
Dept: SPEECH THERAPY | Age: 55
Setting detail: THERAPIES SERIES
Discharge: HOME | End: 2025-07-24
Payer: COMMERCIAL

## 2025-07-24 PROCEDURE — 92507 TX SP LANG VOICE COMM INDIV: CPT

## 2025-07-24 NOTE — PROGRESS NOTES
[facility]  SPEECH THERAPY  [] SPEECH LANGUAGE COGNITIVE EVALUATION  [x] DAILY NOTE   [] PROGRESS NOTE [] DISCHARGE NOTE    [x] OUTPATIENT REHABILITATION CENTER [facility]   [] [facility] CARE [place]    [] [facility]   [] [facility]    Date: 2025  Patient Name:  Karyna Branch  : 1970  MRN: [id number]  CSN: [id number]    Referring Practitioner Chavo Anthony MD [id number]      Diagnosis  Diagnoses       G35 (ICD-10-CM) - Multiple sclerosis (HCC)           Treatment Diagnosis R41.89 Other symptoms and signs involving cognitive functions and awareness  R47.01 Aphasia   Date of Evaluation 25   Additional Pertinent History Karyna Branch has a past medical history of Acid reflux, COVID, Hyperlipidemia, Hypertensive disorder, Migraine headache with aura, MS (multiple sclerosis) (HCC), Raynaud disease, Vitamin B 12 deficiency, and Vitamin D deficiency.  she has a past surgical history that includes Gastric bypass surgery (); shoulder surgery (1999 x 2); Cholecystectomy (); Colonoscopy (2012); Upper gastrointestinal endoscopy (2012); Hemorrhoid surgery; Tympanostomy tube placement (); Tonsillectomy and adenoidectomy (); Colonoscopy; Appendectomy (2018); AVIS STEREO BREAST BX W LOC DEVICE 1ST LESION LEFT (Left, 2012); and Eye surgery (2022).     Allergies Allergies   Allergen Reactions    Latex Rash    Acetaminophen-Codeine     Adhesive Tape Other (See Comments)    Avelox [Moxifloxacin Hydrochloride] Hives     IV Avelox only     Bee Pollen Other (See Comments)    Bee Venom Other (See Comments)    Dust Mite Extract Other (See Comments)    Gluten Meal     Morphine     Pollen Extract     Povidone-Iodine Other (See Comments)    Strawberry Other (See Comments)    Wheat     Betadine [Povidone Iodine] Rash      Medications   Current Outpatient Medications:     atenolol-chlorthalidone (TENORETIC) 50-25 MG per tablet, Take 1

## 2025-07-29 ENCOUNTER — OFFICE VISIT (OUTPATIENT)
Dept: UROLOGY | Age: 55
End: 2025-07-29
Payer: COMMERCIAL

## 2025-07-29 VITALS
SYSTOLIC BLOOD PRESSURE: 114 MMHG | HEIGHT: 70 IN | DIASTOLIC BLOOD PRESSURE: 76 MMHG | BODY MASS INDEX: 41.52 KG/M2 | WEIGHT: 290 LBS

## 2025-07-29 DIAGNOSIS — R33.9 URINARY RETENTION: Primary | ICD-10-CM

## 2025-07-29 LAB — POST VOID RESIDUAL (PVR): 116 ML

## 2025-07-29 PROCEDURE — 3078F DIAST BP <80 MM HG: CPT | Performed by: NURSE PRACTITIONER

## 2025-07-29 PROCEDURE — 99213 OFFICE O/P EST LOW 20 MIN: CPT | Performed by: NURSE PRACTITIONER

## 2025-07-29 PROCEDURE — 51798 US URINE CAPACITY MEASURE: CPT | Performed by: NURSE PRACTITIONER

## 2025-07-29 PROCEDURE — 3074F SYST BP LT 130 MM HG: CPT | Performed by: NURSE PRACTITIONER

## 2025-07-29 ASSESSMENT — ENCOUNTER SYMPTOMS
BACK PAIN: 1
ABDOMINAL PAIN: 0
NAUSEA: 0
VOMITING: 0

## 2025-07-29 NOTE — PROGRESS NOTES
[facility] [place] [facility]  [facility]  [address]  [address]  [facility] [address]  Dept: [phone]  Loc: [phone]    Visit Date: 7/29/2025        HPI:     Karyna Branch is a 55 y.o. female who presents today for:  Chief Complaint   Patient presents with    Urinary Retention    Incontinence    Overactive bladder       HPI    Pt seen in follow up for mixed incontinence and OAB     Hx of MS.    OAB w mixed incontinence (urge>stress).  -Cystoscopy by Dr. Rodriguez 10/2024 normal.   -Gemtesa and Myrbetriq not covered.   -Started on trospium 20 mg BID. Taking 20 mg daily most days.   -Holds dose or delays dose if taking decongestant.   -Had two chalazions since starting trospium     Reports frequency and urgency improved.   Now not having to wear pads routinely unless traveling.   Does still have significant urgency at times.     Current Outpatient Medications   Medication Sig Dispense Refill    atenolol-chlorthalidone (TENORETIC) 50-25 MG per tablet Take 1 tablet by mouth daily Hasnt taken for 2 weeks/ patient checking daily and has been okay 90 tablet 1    dexlansoprazole (DEXILANT) 60 MG CPDR delayed release capsule Take 1 capsule by mouth daily TAKE ONE CAPSULE BY MOUTH DAILY 90 capsule 1    montelukast (SINGULAIR) 10 MG tablet Take 1 tablet by mouth daily 90 tablet 1    gabapentin (NEURONTIN) 300 MG capsule TAKE 1 CAPSULE BY MOUTH AT BEDTIME (TAKE WITH 100MG)      IRON DEXTRAN IJ Infuse intravenously      docusate sodium (COLACE) 100 MG capsule Take 1 capsule by mouth 2 times daily      trospium (SANCTURA) 20 MG tablet Take 1 tablet by mouth 2 times daily 180 tablet 3    aspirin-acetaminophen-caffeine (EXCEDRIN MIGRAINE) 250-250-65 MG per tablet Take by mouth      Calcium Carbonate-Vit D-Min (SM CALCIUM 600+D PLUS MINERALS) 600-800 MG-UNIT CHEW Take by mouth      diphenhydrAMINE-APAP, sleep, (TYLENOL PM EXTRA STRENGTH PO) Take by mouth      EPINEPHrine (EPIPEN JR) 0.15

## 2025-07-31 ENCOUNTER — HOSPITAL ENCOUNTER (OUTPATIENT)
Dept: SPEECH THERAPY | Age: 55
Setting detail: THERAPIES SERIES
Discharge: HOME | End: 2025-07-31
Payer: COMMERCIAL

## 2025-07-31 PROCEDURE — 92507 TX SP LANG VOICE COMM INDIV: CPT

## 2025-07-31 NOTE — PROGRESS NOTES
AFTER EVALUATION.  INSURANCE THERAPY BENEFIT: Allowed 20 visits per calendar year.  0 visits have been used. Hard Max.   Initial CPT Codes Requested 41747 - Speech/Language Therapy  18180 - Cognitive Function Interventions (first 15 min) and 96824 - Cognitive Function Interventions (ea addtl 15 min)   Progress Note Counter 3/10 for progress note (Reporting Period: 6/12/25 to  )   Recertification Date 08/07/25   Physician Follow-Up Dr. Anthony - 9/25/25   Physician Orders    History of Present Illness Patient reports she was diagnosed with multiple sclerosis in 1991.  Patient reports she says the wrong word at times (paraphasias).  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  Patient also reports she has instances of anomia within conversation.  Patient also reports having difficulty with her short term memory.  Patient reports she has forgotten to take her night pills at times, more so if her routine changes.  Patient reports her symptoms have gotten worse over the last 3-4 years or so.    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  Patient reports she notices her voice changing/giving out on her at times.  Patient also reports she has difficulty singing at [employer].      SUBJECTIVE: Patient arrived to this appointment independently, slightly later.  Patient very pleasant and motivated.     GOALS:  Patient Goal: did not state this date, will address in future session    Short Term Goals:  Time Frame for Short-Term Goals: 4 weeks    SHORT TERM GOAL #1: Patient will complete high level executive function tasks (multi-steps) with 80% accuracy without cues for improved success with completing IADL's.  INTERVENTIONS: ST outlined/explained this specific goal with specific examples; Patient reporting \"I know I have to have a clear head when I

## 2025-08-08 ENCOUNTER — APPOINTMENT (OUTPATIENT)
Dept: SPEECH THERAPY | Age: 55
End: 2025-08-08
Payer: COMMERCIAL

## 2025-08-14 ENCOUNTER — HOSPITAL ENCOUNTER (OUTPATIENT)
Dept: SPEECH THERAPY | Age: 55
Setting detail: THERAPIES SERIES
Discharge: HOME | End: 2025-08-14
Payer: COMMERCIAL

## 2025-08-14 PROCEDURE — 97130 THER IVNTJ EA ADDL 15 MIN: CPT | Performed by: SPEECH-LANGUAGE PATHOLOGIST

## 2025-08-14 PROCEDURE — 97129 THER IVNTJ 1ST 15 MIN: CPT | Performed by: SPEECH-LANGUAGE PATHOLOGIST

## 2025-08-21 ENCOUNTER — HOSPITAL ENCOUNTER (OUTPATIENT)
Dept: SPEECH THERAPY | Age: 55
Setting detail: THERAPIES SERIES
Discharge: HOME | End: 2025-08-21
Payer: COMMERCIAL

## 2025-08-21 PROCEDURE — 97129 THER IVNTJ 1ST 15 MIN: CPT | Performed by: SPEECH-LANGUAGE PATHOLOGIST

## 2025-08-21 PROCEDURE — 97130 THER IVNTJ EA ADDL 15 MIN: CPT | Performed by: SPEECH-LANGUAGE PATHOLOGIST

## 2025-08-26 RX ORDER — HYDROXYCHLOROQUINE SULFATE 200 MG/1
200 TABLET, FILM COATED ORAL 2 TIMES DAILY
Qty: 180 TABLET | Refills: 1 | Status: SHIPPED | OUTPATIENT
Start: 2025-08-26